# Patient Record
Sex: FEMALE | Race: WHITE | NOT HISPANIC OR LATINO | Employment: FULL TIME | URBAN - METROPOLITAN AREA
[De-identification: names, ages, dates, MRNs, and addresses within clinical notes are randomized per-mention and may not be internally consistent; named-entity substitution may affect disease eponyms.]

---

## 2017-02-23 LAB — HM PAP SMEAR: NORMAL

## 2017-03-20 ENCOUNTER — OFFICE VISIT (OUTPATIENT)
Dept: INTERNAL MEDICINE | Facility: CLINIC | Age: 22
End: 2017-03-20

## 2017-03-20 VITALS
DIASTOLIC BLOOD PRESSURE: 78 MMHG | WEIGHT: 144 LBS | HEART RATE: 79 BPM | BODY MASS INDEX: 21.82 KG/M2 | HEIGHT: 68 IN | OXYGEN SATURATION: 97 % | SYSTOLIC BLOOD PRESSURE: 108 MMHG

## 2017-03-20 DIAGNOSIS — F32.A ANXIETY AND DEPRESSION: ICD-10-CM

## 2017-03-20 DIAGNOSIS — F41.9 ANXIETY AND DEPRESSION: ICD-10-CM

## 2017-03-20 DIAGNOSIS — L70.0 ACNE VULGARIS: ICD-10-CM

## 2017-03-20 DIAGNOSIS — Z00.00 HEALTH CARE MAINTENANCE: ICD-10-CM

## 2017-03-20 DIAGNOSIS — J45.40 MODERATE PERSISTENT ASTHMA WITHOUT COMPLICATION: Primary | ICD-10-CM

## 2017-03-20 PROBLEM — R01.1 HEART MURMUR: Status: ACTIVE | Noted: 2017-03-20

## 2017-03-20 PROBLEM — J45.30 MILD PERSISTENT ASTHMA: Status: ACTIVE | Noted: 2017-03-20

## 2017-03-20 PROCEDURE — 99214 OFFICE O/P EST MOD 30 MIN: CPT | Performed by: INTERNAL MEDICINE

## 2017-03-20 PROCEDURE — 94640 AIRWAY INHALATION TREATMENT: CPT | Performed by: INTERNAL MEDICINE

## 2017-03-20 RX ORDER — ALBUTEROL SULFATE 2.5 MG/3ML
2.5 SOLUTION RESPIRATORY (INHALATION) ONCE
Status: COMPLETED | OUTPATIENT
Start: 2017-03-20 | End: 2017-03-20

## 2017-03-20 RX ORDER — ONDANSETRON HYDROCHLORIDE 8 MG/1
8 TABLET, FILM COATED ORAL EVERY 8 HOURS PRN
Qty: 20 TABLET | Refills: 0 | Status: SHIPPED | OUTPATIENT
Start: 2017-03-20 | End: 2017-05-16

## 2017-03-20 RX ORDER — ALBUTEROL SULFATE 90 UG/1
2 AEROSOL, METERED RESPIRATORY (INHALATION) EVERY 4 HOURS PRN
COMMUNITY
End: 2017-05-16

## 2017-03-20 RX ORDER — SERTRALINE HYDROCHLORIDE 25 MG/1
25 TABLET, FILM COATED ORAL DAILY
Qty: 30 TABLET | Refills: 1 | Status: SHIPPED | OUTPATIENT
Start: 2017-03-20 | End: 2017-05-15 | Stop reason: SDUPTHER

## 2017-03-20 RX ADMIN — ALBUTEROL SULFATE 2.5 MG: 2.5 SOLUTION RESPIRATORY (INHALATION) at 14:59

## 2017-03-20 NOTE — PROGRESS NOTES
Subjective     Humberto Gold is a 21 y.o. 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 She is [place] in 3 months and is having an IUD placed by     Anxiety/Depression: chronic issue and saw a therapist in high school that did not help as much as she would like. She was very apathetic and stayed in her own little bubble. She does not eat when she depressed and has poor sleep. She was engaged at age 20 years old and broke it off on her own and felt well after that, but then found that she. She has anxiety when she is in a group setting and has social anxiety. She has a studdor when her anxiety is worse in social situations. She has wanted to hurt herself several times, but did not have a plan. These both happened within the last 2 years.     Asthma: chronic issue and is currently on Pulmicort BID (that she restarted in mid February) and is doing much better. She uses her albuterol about once per week. She does not have night time cough. She has never been admitted for her asthma and has not had steroids in the past that she knows of . She has some SOB with exercise. It is triggered by allergies and cold weather. She has not been tested for allergies. She has had PFT's.     Acne: chronic issue and has used multiple products in past including Clinique acne, Peter Mark Barba, anti-biotic cream, and benzyl peroxide cream with no help. She is interested in trying oral medication including antibiotics and Accutane.        The following portions of the patient's history were reviewed and updated as appropriate: allergies, current medications, past family history, past medical history, past social history, past surgical history and problem list.    Allergies: Peanuts [peanut oil] and Penicillins    Current Outpatient  "Prescriptions:   •  albuterol (PROVENTIL HFA;VENTOLIN HFA) 108 (90 BASE) MCG/ACT inhaler, Inhale 2 puffs Every 4 (Four) Hours As Needed., Disp: , Rfl:   •  budesonide (PULMICORT) 90 MCG/ACT inhaler, Inhale 1 puff 2 (Two) Times a Day., Disp: 1 inhaler, Rfl: 0  •  levonorgestrel-ethinyl estradiol (ORSYTHIA) 0.1-20 MG-MCG per tablet, Take 1 tablet by mouth Daily., Disp: , Rfl:   •  ondansetron (ZOFRAN) 8 MG tablet, Take 1 tablet by mouth Every 8 (Eight) Hours As Needed for Nausea or Vomiting., Disp: 20 tablet, Rfl: 0  •  sertraline (ZOLOFT) 25 MG tablet, Take 1 tablet by mouth Daily., Disp: 30 tablet, Rfl: 1  No current facility-administered medications for this visit.       Review of Systems   Constitutional: Negative for chills, fatigue and fever.   HENT: Negative for congestion and rhinorrhea.    Respiratory: Negative for cough, shortness of breath and wheezing.    Gastrointestinal: Negative for abdominal pain, diarrhea and nausea.   Genitourinary: Negative for difficulty urinating and dysuria.   Skin: Positive for rash (Acne on face).   Psychiatric/Behavioral: Positive for dysphoric mood and sleep disturbance. Negative for suicidal ideas. The patient is nervous/anxious.        Objective     Visit Vitals   • /78 (BP Location: Left arm, Patient Position: Sitting, Cuff Size: Adult)   • Pulse 79   • Ht 68\" (172.7 cm)   • Wt 144 lb (65.3 kg)   • SpO2 97%   • BMI 21.9 kg/m2         Physical Exam   Constitutional: She is oriented to person, place, and time. She appears well-developed and well-nourished. No distress.   HENT:   Head: Normocephalic and atraumatic.   Right Ear: Tympanic membrane and external ear normal.   Left Ear: Tympanic membrane and external ear normal.   Mouth/Throat: Oropharynx is clear and moist. No oropharyngeal exudate.   Eyes: Conjunctivae are normal. Right eye exhibits no discharge. Left eye exhibits no discharge. No scleral icterus.   Neck: Neck supple.   Cardiovascular: Normal rate, regular " rhythm and normal heart sounds.  Exam reveals no gallop and no friction rub.    No murmur heard.  Pulmonary/Chest: Effort normal and breath sounds normal. No respiratory distress. She has no wheezes. She has no rales.   Abdominal: Soft. Bowel sounds are normal. She exhibits no distension and no mass. There is no tenderness. There is no guarding.   Lymphadenopathy:     She has no cervical adenopathy.   Neurological: She is alert and oriented to person, place, and time.   Skin: Skin is warm. No rash noted.   Psychiatric: She has a normal mood and affect. Her behavior is normal.   Nursing note and vitals reviewed.        Pulmonary Function Test  Date/Time: 3/20/2017 3:21 PM  Performed by: AVE GIPSON  Authorized by: AVE GIPSON   Comments: PFT's with FEV1 of 64% with 97% after albuterol with 151% change after bronchodilator   Moderate persistent asthma    Just restarted back on Pulmicort about 3-4 weeks ago           Results for orders placed or performed during the hospital encounter of 02/20/17   POCT Influenza A/B   Result Value Ref Range    Rapid Influenza A Ag Neg     Rapid Influenza B Ag Neg     Internal Control Passed Passed    Lot Number [id number]     Expiration Date 12/23/18        Assessment/Plan   Humberto was seen today for establish care and asthma.    Diagnoses and all orders for this visit:    Moderate persistent asthma without complication  -     Pulmonary Function Test  -     albuterol (PROVENTIL) nebulizer solution 0.083% 2.5 mg/3mL; Take 2.5 mg by nebulization 1 (One) Time.    Anxiety and depression    Acne vulgaris  -     Ambulatory Referral to Dermatology    Health care maintenance  -     CBC & Differential  -     Comprehensive Metabolic Panel  -     Lipid Panel With LDL / HDL Ratio  -     TSH Rfx On Abnormal To Free T4  -     Urinalysis With / Culture If Indicated    Other orders  -     sertraline (ZOLOFT) 25 MG tablet; Take 1 tablet by mouth Daily.  -     ondansetron (ZOFRAN) 8 MG tablet;  Take 1 tablet by mouth Every 8 (Eight) Hours As Needed for Nausea or Vomiting.    Asthma: PFT's show FEV1 of 64% today with great improvement. Will continue Pulmicort and as needed albuterol. She has just restarted Pulmicort, so her PFT's may not truly affect decreased inflammation. Will reassess at next visit with me before she moves to [place]. If still having symptoms of SOB with exertion, will consider combined ICS/LABA.    Anxiety/depression: will start Zoloft and see how she does. Interested in therapy, but would probably be best to have this at college in [place] when she moves in a few weeks. Educated on side effects and when to call. Will start with 25mg and increase to 50mg at our next visit if she is doing well.     Acne: interested in seeing dermatologist and will have her establish are with someone here. If this takes a while, we may need to send to [place].       Return in about 8 weeks (around 5/16/2017) for Annual physical, Recheck.    Corina Cuenca MD  03/20/201721

## 2017-04-14 ENCOUNTER — OFFICE VISIT (OUTPATIENT)
Dept: OBSTETRICS AND GYNECOLOGY | Facility: CLINIC | Age: 22
End: 2017-04-14

## 2017-04-14 VITALS
DIASTOLIC BLOOD PRESSURE: 78 MMHG | BODY MASS INDEX: 21.82 KG/M2 | WEIGHT: 144 LBS | HEIGHT: 68 IN | SYSTOLIC BLOOD PRESSURE: 122 MMHG

## 2017-04-14 DIAGNOSIS — Z13.9 SCREENING: Primary | ICD-10-CM

## 2017-04-14 DIAGNOSIS — Z01.419 NORMAL GYNECOLOGIC EXAMINATION: ICD-10-CM

## 2017-04-14 DIAGNOSIS — Z30.011 ENCOUNTER FOR INITIAL PRESCRIPTION OF CONTRACEPTIVE PILLS: ICD-10-CM

## 2017-04-14 LAB
B-HCG UR QL: NEGATIVE
BILIRUB BLD-MCNC: NEGATIVE MG/DL
CLARITY, POC: CLEAR
COLOR UR: YELLOW
GLUCOSE UR STRIP-MCNC: NEGATIVE MG/DL
INTERNAL NEGATIVE CONTROL: NEGATIVE
INTERNAL POSITIVE CONTROL: POSITIVE
KETONES UR QL: NEGATIVE
LEUKOCYTE EST, POC: NEGATIVE
Lab: NORMAL
NITRITE UR-MCNC: NEGATIVE MG/ML
PH UR: 6 [PH] (ref 5–8)
PROT UR STRIP-MCNC: NEGATIVE MG/DL
RBC # UR STRIP: ABNORMAL /UL
SP GR UR: 1 (ref 1–1.03)
UROBILINOGEN UR QL: NORMAL

## 2017-04-14 PROCEDURE — 81025 URINE PREGNANCY TEST: CPT | Performed by: NURSE PRACTITIONER

## 2017-04-14 PROCEDURE — 99385 PREV VISIT NEW AGE 18-39: CPT | Performed by: NURSE PRACTITIONER

## 2017-04-14 PROCEDURE — 81002 URINALYSIS NONAUTO W/O SCOPE: CPT | Performed by: NURSE PRACTITIONER

## 2017-04-14 RX ORDER — LEVONORGESTREL AND ETHINYL ESTRADIOL 0.1-0.02MG
1 KIT ORAL DAILY
Qty: 28 TABLET | Refills: 10 | Status: SHIPPED | OUTPATIENT
Start: 2017-04-14 | End: 2017-05-16

## 2017-04-17 PROBLEM — Z13.9 SCREENING: Status: ACTIVE | Noted: 2017-04-17

## 2017-04-17 PROBLEM — Z30.011 ENCOUNTER FOR INITIAL PRESCRIPTION OF CONTRACEPTIVE PILLS: Status: ACTIVE | Noted: 2017-04-17

## 2017-04-17 PROBLEM — Z01.419 NORMAL GYNECOLOGIC EXAMINATION: Status: ACTIVE | Noted: 2017-04-17

## 2017-04-17 NOTE — PROGRESS NOTES
GYN Annual Exam     No chief complaint on file.      Humberto Gold is a 21 y.o. female who presents for annual well woman exam. Periods are regular every 28-30 days, lasting 5 days. Dysmenorrhea:none. Cyclic symptoms include none. No intermenstrual bleeding, spotting, or discharge.    OB History      Para Term  AB TAB SAB Ectopic Multiple Living    0 0 0 0 0 0 0 0 0 0          Current contraception: none  History of abnormal Pap smear: no  Family history of uterine, colon or ovarian cancer: no  History of abnormal mammogram: no  Family history of breast cancer: no  Last Pap : NA  Gardasil Vaccine: completed     Past Medical History:   Diagnosis Date   • Acne    • Asthma    • Heart murmur        History reviewed. No pertinent surgical history.      Current Outpatient Prescriptions:   •  albuterol (PROVENTIL HFA;VENTOLIN HFA) 108 (90 BASE) MCG/ACT inhaler, Inhale 2 puffs Every 4 (Four) Hours As Needed., Disp: , Rfl:   •  budesonide (PULMICORT) 90 MCG/ACT inhaler, Inhale 1 puff 2 (Two) Times a Day., Disp: 1 inhaler, Rfl: 0  •  levonorgestrel-ethinyl estradiol (AVIANE,ALESSE,LESSINA) 0.1-20 MG-MCG per tablet, Take 1 tablet by mouth Daily., Disp: 28 tablet, Rfl: 10  •  levonorgestrel-ethinyl estradiol (ORSYTHIA) 0.1-20 MG-MCG per tablet, Take 1 tablet by mouth Daily., Disp: , Rfl:   •  ondansetron (ZOFRAN) 8 MG tablet, Take 1 tablet by mouth Every 8 (Eight) Hours As Needed for Nausea or Vomiting., Disp: 20 tablet, Rfl: 0  •  sertraline (ZOLOFT) 25 MG tablet, Take 1 tablet by mouth Daily., Disp: 30 tablet, Rfl: 1    Allergies   Allergen Reactions   • Peanuts [Peanut Oil] Anaphylaxis   • Penicillins Anaphylaxis       Social History   Substance Use Topics   • Smoking status: Never Smoker   • Smokeless tobacco: Never Used   • Alcohol use Yes      Comment: social       Family History   Problem Relation Age of Onset   • Heart disease Paternal Grandfather    • Diabetes type II Paternal Grandfather        Review of  "Systems   Constitutional: Negative.    Respiratory: Negative.    Cardiovascular: Negative.    Gastrointestinal: Negative.    Genitourinary: Negative.    Skin: Negative.    Psychiatric/Behavioral: Negative.        /78  Ht 68\" (172.7 cm)  Wt 144 lb (65.3 kg)  LMP 04/12/2017 (Exact Date)  BMI 21.9 kg/m2    Physical Exam   Constitutional: She is oriented to person, place, and time. She appears well-developed.   Neck: Normal range of motion. Neck supple. No thyromegaly present.   Cardiovascular: Normal rate and regular rhythm.    Pulmonary/Chest: Effort normal and breath sounds normal. She exhibits no mass, no tenderness, no laceration, no edema and no swelling. Right breast exhibits no inverted nipple, no mass, no nipple discharge and no tenderness. Left breast exhibits no inverted nipple, no mass, no nipple discharge, no skin change and no tenderness.   Abdominal: Soft. Bowel sounds are normal. Hernia confirmed negative in the right inguinal area and confirmed negative in the left inguinal area.   Genitourinary: No labial fusion. There is no rash, tenderness, lesion or injury on the right labia. There is no rash, tenderness, lesion or injury on the left labia. Uterus is not deviated, not enlarged, not fixed and not tender. Cervix exhibits no motion tenderness, no discharge and no friability. Right adnexum displays no mass, no tenderness and no fullness. Left adnexum displays no tenderness and no fullness. No erythema, tenderness or bleeding in the vagina. No foreign body in the vagina. No signs of injury around the vagina. No vaginal discharge found.   Genitourinary Comments: Pap collected    Musculoskeletal: Normal range of motion.   Lymphadenopathy:        Right: No inguinal adenopathy present.        Left: No inguinal adenopathy present.   Neurological: She is alert and oriented to person, place, and time. She has normal reflexes.   Skin: Skin is warm and dry.   Vitals reviewed.         Assessment     1) " GYN annual well woman exam.   2) Contraception management      Plan     1) Breast Health - Clinical breast exam yearly, Self breast awareness monthly  2) Pap - Collected today   3) Contraception-Quick start OCPs. Rev ACHES. BUM X 2 weeks  4) STD- Enc condom use.  Declines STD panel  5) Smoking status- Non smoker    6) Follow up prn and one year.     Althea Friedman, GOGO  4/17/2017  3:15 PM

## 2017-05-11 LAB
ALBUMIN SERPL-MCNC: 4.1 G/DL (ref 3.5–5.2)
ALBUMIN/GLOB SERPL: 1.5 G/DL
ALP SERPL-CCNC: 55 U/L (ref 40–129)
ALT SERPL-CCNC: 7 U/L (ref 5–33)
APPEARANCE UR: CLEAR
AST SERPL-CCNC: 12 U/L (ref 5–32)
BACTERIA #/AREA URNS HPF: ABNORMAL /HPF
BACTERIA UR CULT: NORMAL
BACTERIA UR CULT: NORMAL
BASOPHILS # BLD AUTO: 0.07 10*3/MM3 (ref 0–0.2)
BASOPHILS NFR BLD AUTO: 1.1 % (ref 0–2)
BILIRUB SERPL-MCNC: 0.6 MG/DL (ref 0.2–1.2)
BILIRUB UR QL STRIP: NEGATIVE
BUN SERPL-MCNC: 8 MG/DL (ref 6–20)
BUN/CREAT SERPL: 12.1 (ref 7–25)
CALCIUM SERPL-MCNC: 8.8 MG/DL (ref 8.6–10.5)
CHLORIDE SERPL-SCNC: 102 MMOL/L (ref 98–107)
CHOLEST SERPL-MCNC: 146 MG/DL (ref 0–200)
CO2 SERPL-SCNC: 25.1 MMOL/L (ref 22–29)
COLOR UR: YELLOW
CREAT SERPL-MCNC: 0.66 MG/DL (ref 0.57–1)
EOSINOPHIL # BLD AUTO: 0.7 10*3/MM3 (ref 0.1–0.3)
EOSINOPHIL NFR BLD AUTO: 10.6 % (ref 0–4)
EPI CELLS #/AREA URNS HPF: ABNORMAL /HPF
ERYTHROCYTE [DISTWIDTH] IN BLOOD BY AUTOMATED COUNT: 12.2 % (ref 11.5–14.5)
GLOBULIN SER CALC-MCNC: 2.7 GM/DL
GLUCOSE SERPL-MCNC: 78 MG/DL (ref 65–99)
GLUCOSE UR QL: NEGATIVE
HCT VFR BLD AUTO: 38.7 % (ref 37–47)
HDLC SERPL-MCNC: 74 MG/DL (ref 40–60)
HGB BLD-MCNC: 12.9 G/DL (ref 12–16)
HGB UR QL STRIP: NEGATIVE
IMM GRANULOCYTES # BLD: 0.02 10*3/MM3 (ref 0–0.03)
IMM GRANULOCYTES NFR BLD: 0.3 % (ref 0–0.5)
KETONES UR QL STRIP: NEGATIVE
LDLC SERPL CALC-MCNC: 59 MG/DL (ref 0–100)
LDLC/HDLC SERPL: 0.8 {RATIO}
LEUKOCYTE ESTERASE UR QL STRIP: ABNORMAL
LYMPHOCYTES # BLD AUTO: 2.07 10*3/MM3 (ref 0.6–4.8)
LYMPHOCYTES NFR BLD AUTO: 31.5 % (ref 20–45)
MCH RBC QN AUTO: 29.9 PG (ref 27–31)
MCHC RBC AUTO-ENTMCNC: 33.3 G/DL (ref 31–37)
MCV RBC AUTO: 89.6 FL (ref 81–99)
MICRO URNS: ABNORMAL
MONOCYTES # BLD AUTO: 0.46 10*3/MM3 (ref 0–1)
MONOCYTES NFR BLD AUTO: 7 % (ref 3–8)
MUCOUS THREADS URNS QL MICRO: PRESENT /HPF
NEUTROPHILS # BLD AUTO: 3.26 10*3/MM3 (ref 1.5–8.3)
NEUTROPHILS NFR BLD AUTO: 49.5 % (ref 45–70)
NITRITE UR QL STRIP: NEGATIVE
NRBC BLD AUTO-RTO: 0 /100 WBC (ref 0–0)
PH UR STRIP: 7 [PH] (ref 5–7.5)
PLATELET # BLD AUTO: 296 10*3/MM3 (ref 140–500)
POTASSIUM SERPL-SCNC: 5 MMOL/L (ref 3.5–5.2)
PROT SERPL-MCNC: 6.8 G/DL (ref 6–8.5)
PROT UR QL STRIP: NEGATIVE
RBC # BLD AUTO: 4.32 10*6/MM3 (ref 4.2–5.4)
RBC #/AREA URNS HPF: ABNORMAL /HPF
SODIUM SERPL-SCNC: 140 MMOL/L (ref 136–145)
SP GR UR: 1.02 (ref 1–1.03)
TRIGL SERPL-MCNC: 64 MG/DL (ref 0–150)
TSH SERPL DL<=0.005 MIU/L-ACNC: 0.71 MIU/ML (ref 0.27–4.2)
URINALYSIS REFLEX: ABNORMAL
UROBILINOGEN UR STRIP-MCNC: 0.2 MG/DL (ref 0.2–1)
VLDLC SERPL CALC-MCNC: 12.8 MG/DL (ref 7–27)
WBC # BLD AUTO: 6.58 10*3/MM3 (ref 4.8–10.8)
WBC #/AREA URNS HPF: ABNORMAL /HPF

## 2017-05-15 RX ORDER — SERTRALINE HYDROCHLORIDE 25 MG/1
25 TABLET, FILM COATED ORAL DAILY
Qty: 30 TABLET | Refills: 6 | Status: SHIPPED | OUTPATIENT
Start: 2017-05-15 | End: 2017-05-16

## 2017-05-16 ENCOUNTER — OFFICE VISIT (OUTPATIENT)
Dept: INTERNAL MEDICINE | Facility: CLINIC | Age: 22
End: 2017-05-16

## 2017-05-16 VITALS
HEIGHT: 68 IN | BODY MASS INDEX: 22.13 KG/M2 | OXYGEN SATURATION: 98 % | WEIGHT: 146 LBS | DIASTOLIC BLOOD PRESSURE: 72 MMHG | HEART RATE: 79 BPM | SYSTOLIC BLOOD PRESSURE: 118 MMHG

## 2017-05-16 DIAGNOSIS — F32.A ANXIETY AND DEPRESSION: ICD-10-CM

## 2017-05-16 DIAGNOSIS — F41.9 ANXIETY AND DEPRESSION: ICD-10-CM

## 2017-05-16 DIAGNOSIS — Z00.00 ANNUAL PHYSICAL EXAM: Primary | ICD-10-CM

## 2017-05-16 PROCEDURE — 99395 PREV VISIT EST AGE 18-39: CPT | Performed by: INTERNAL MEDICINE

## 2017-05-16 PROCEDURE — 99212 OFFICE O/P EST SF 10 MIN: CPT | Performed by: INTERNAL MEDICINE

## 2017-05-16 PROCEDURE — 93000 ELECTROCARDIOGRAM COMPLETE: CPT | Performed by: INTERNAL MEDICINE

## 2017-05-16 RX ORDER — CETIRIZINE HYDROCHLORIDE 10 MG/1
10 TABLET ORAL DAILY
COMMUNITY
End: 2017-08-04

## 2017-05-16 RX ORDER — CITALOPRAM 10 MG/1
10 TABLET ORAL DAILY
Qty: 30 TABLET | Refills: 6 | Status: SHIPPED | OUTPATIENT
Start: 2017-05-16 | End: 2017-09-26

## 2017-05-22 RX ORDER — LEVONORGESTREL AND ETHINYL ESTRADIOL 0.1-0.02MG
KIT ORAL
Qty: 28 TABLET | Refills: 10 | Status: SHIPPED | OUTPATIENT
Start: 2017-05-22 | End: 2018-04-20

## 2017-08-04 ENCOUNTER — OFFICE VISIT (OUTPATIENT)
Dept: INTERNAL MEDICINE | Facility: CLINIC | Age: 22
End: 2017-08-04

## 2017-08-04 VITALS
SYSTOLIC BLOOD PRESSURE: 108 MMHG | OXYGEN SATURATION: 98 % | HEART RATE: 76 BPM | HEIGHT: 68 IN | WEIGHT: 147 LBS | DIASTOLIC BLOOD PRESSURE: 70 MMHG | BODY MASS INDEX: 22.28 KG/M2

## 2017-08-04 DIAGNOSIS — J45.42 MODERATE PERSISTENT ASTHMA WITH STATUS ASTHMATICUS: Primary | ICD-10-CM

## 2017-08-04 PROCEDURE — 99213 OFFICE O/P EST LOW 20 MIN: CPT | Performed by: INTERNAL MEDICINE

## 2017-08-04 RX ORDER — FEXOFENADINE HYDROCHLORIDE 60 MG/1
60 TABLET, FILM COATED ORAL DAILY
COMMUNITY
End: 2018-04-20

## 2017-08-04 RX ORDER — ALBUTEROL SULFATE 2.5 MG/3ML
2.5 SOLUTION RESPIRATORY (INHALATION) ONCE
Status: DISCONTINUED | OUTPATIENT
Start: 2017-08-04 | End: 2018-07-19 | Stop reason: SDUPTHER

## 2017-08-04 RX ORDER — ALBUTEROL SULFATE 2.5 MG/3ML
2.5 SOLUTION RESPIRATORY (INHALATION) EVERY 6 HOURS PRN
Status: DISCONTINUED | OUTPATIENT
Start: 2017-08-04 | End: 2019-05-16

## 2017-08-04 RX ORDER — ALBUTEROL SULFATE 90 UG/1
2 AEROSOL, METERED RESPIRATORY (INHALATION) EVERY 4 HOURS PRN
Qty: 1 INHALER | Refills: 6 | Status: SHIPPED | OUTPATIENT
Start: 2017-08-04 | End: 2018-11-14 | Stop reason: SDUPTHER

## 2017-08-04 RX ORDER — PREDNISONE 50 MG/1
50 TABLET ORAL DAILY
Qty: 5 TABLET | Refills: 0 | Status: SHIPPED | OUTPATIENT
Start: 2017-08-04 | End: 2017-08-09

## 2017-08-04 RX ORDER — BUDESONIDE AND FORMOTEROL FUMARATE DIHYDRATE 80; 4.5 UG/1; UG/1
2 AEROSOL RESPIRATORY (INHALATION)
COMMUNITY
End: 2018-03-15 | Stop reason: SDUPTHER

## 2017-08-04 NOTE — PATIENT INSTRUCTIONS
Asthma Action Plan, Adult  Name: ________________________________ Date: _______  Follow-Up Visit With Health Care Provider  Bring your medicines to your follow-up visits.  · Health care provider name: ____________________  · Telephone: ____________________  · How often should I see my health care provider? ____________________  The actions that you should take to control your asthma are based on the symptoms that you are having. The condition can be divided into 3 zones: the green zone, yellow zone, and red zone. Follow the action steps for the zone that you are in each day.  GREEN ZONE: WHEN ASTHMA IS UNDER CONTROL  SIGNS AND SYMPTOMS  You may not have any symptoms while you are in the green zone. This means that you:  · Have no coughing or wheezing, even while you are working or playing.  · Sleep through the night.  · Are breathing well.  If you use a peak flow meter:  The peak flow is above ______ (80% of your personal best or greater).  You should take these medicines every day:  · Controller medicine and dosage: ________________  · Controller medicine and dosage: ________________  · Controller medicine and dosage: ________________  · Controller medicine and dosage: ________________  · Before exercise, use this reliever or rescue medicine: ________________  Call your health care provider if:  · You are using a reliever or rescue medicine more than 2-3 times per week.  YELLOW ZONE: WHEN ASTHMA IS GETTING WORSE  SIGNS AND SYMPTOMS  When you are in the yellow zone, you may have symptoms that interfere with exercise, are noticeably worse after exposure to triggers, or are worse at the first sign of a cold (upper respiratory infection). These may include:  · Waking from sleep.  · Coughing, especially at night or first thing in the morning.  · Mild wheezing.  · Chest tightness.  If you use a peak flow meter:  The peak flow is _____ to _____ (50-79% of your personal best).  Add the following medicine to the ones that  you use daily:  · Reliever or rescue medicine and dosage: ________________  · Additional medicine and dosage: ________________  Call your health care provider if:  · You are using a reliever or rescue medicine more than 2-3 times per week.  · You remain in the yellow zone for _____ hours.  RED ZONE: WHEN ASTHMA IS SEVERE   SIGNS AND SYMPTOMS  You will likely feel distressed and have symptoms at rest that restrict your activity. You are in the red zone if:  · Your breathing is hard and quickly.  · Your nose opens wide, your ribs show, and your neck muscles become visible when you breathe in.  · Your lips, fingers, or toes are a bluish color.  · You have trouble speaking in full sentences.  · Your symptoms do not improve within 15-20 minutes after you use your reliever or rescue medicine (bronchodilator).  If you use a peak flow meter:  The peak flow is less than _____ (less than 50% of your personal best).  Call your local emergency services (911 in the [place] right away or seek help at the emergency department of the nearest hospital.  Use your reliever or rescue medicine.  · Start a nebulizer treatment or take 2-4 puffs from a metered-dose inhaler with a spacer.  · Repeat this action every 15-20 minutes until help arrives.  WHAT ARE SOME COMMON ASTHMA TRIGGERS?  Discuss your asthma triggers with your health care provider. Some common triggers are:  · Dander from the skin, hair, or feathers of animals.  · Dust mites.  · Cockroaches.  · Pollen from trees or grass.  · Mold.  · Cigarette or tobacco smoke.  · Air pollutants, such as dust, household , hair sprays, aerosol sprays, scented candles, paint fumes, strong chemicals, or strong odors.  · Cold air or changes in weather. Cold air may cause inflammation. Winds increase molds and pollens in the air.  · Strong emotions, such as crying or laughing hard.  · Stress.  · Certain medicines, such as aspirin or beta blockers.  · Sulfites in foods and drinks, such as  dried fruits and wine.  · Infections or inflammatory conditions, such as:    Flu (influenza).    Upper respiratory tract infection.    Lower respiratory tract infection (pneumonia or bronchitis).    Inflammation of the nasal membranes (rhinitis).  · Gastroesophageal reflux disease (GERD). GERD is a condition in which stomach acid comes up into the throat (esophagus).  · Exercise or strenuous activity.     This information is not intended to replace advice given to you by your health care provider. Make sure you discuss any questions you have with your health care provider.     Document Released: 10/15/2010 Document Revised: 04/10/2017 Document Reviewed: 10/06/2015  [employer] Interactive Patient Education ©2017 [employer]

## 2017-08-04 NOTE — PROGRESS NOTES
Subjective     Humberto Gold is a 21 y.o. female, who presents with a chief complaint of   Chief Complaint   Patient presents with   • Asthma     x issues, chest tightness, 3 x weeks, switch to symbicort        HPI Comments: 20 yo F with moderate persistent asthma who ran out of pulmicort two weeks ago and did not call to let us know. She has been using her rescue inhaler three to four times per day and sometimes at night. She has been very short of breath. No wheezing that she has noticed. Recently switched her Zyrtec to Allegra.Has not been on steroid in a long time. Last albuterol 2 puffs at 10am this morning. No fevers or chills. No congestion.        The following portions of the patient's history were reviewed and updated as appropriate: allergies, current medications, past family history, past medical history, past social history, past surgical history and problem list.    Allergies: Peanuts [peanut oil] and Penicillins    Current Outpatient Prescriptions:   •  budesonide (PULMICORT) 90 MCG/ACT inhaler, Inhale 1 puff 2 (Two) Times a Day., Disp: 1 inhaler, Rfl: 0  •  budesonide-formoterol (SYMBICORT) 80-4.5 MCG/ACT inhaler, Inhale 2 puffs 2 (Two) Times a Day., Disp: , Rfl:   •  citalopram (CELEXA) 10 MG tablet, Take 1 tablet by mouth Daily., Disp: 30 tablet, Rfl: 6  •  fexofenadine (ALLEGRA) 60 MG tablet, Take 60 mg by mouth Daily., Disp: , Rfl:   •  ORSYTHIA 0.1-20 MG-MCG per tablet, TAKE ONE TABLET BY MOUTH DAILY, Disp: 28 tablet, Rfl: 10  •  albuterol (PROVENTIL HFA;VENTOLIN HFA) 108 (90 BASE) MCG/ACT inhaler, Inhale 2 puffs Every 4 (Four) Hours As Needed for Wheezing., Disp: 1 inhaler, Rfl: 6  •  predniSONE (DELTASONE) 50 MG tablet, Take 1 tablet by mouth Daily for 5 days., Disp: 5 tablet, Rfl: 0    Current Facility-Administered Medications:   •  albuterol (PROVENTIL) nebulizer solution 0.083% 2.5 mg/3mL, 2.5 mg, Nebulization, Once, Corina Cuenca MD  •  albuterol (PROVENTIL) nebulizer solution 0.083% 2.5  "mg/3mL, 2.5 mg, Nebulization, Q6H PRN, Corina Cuenca MD  Medications Discontinued During This Encounter   Medication Reason   • cetirizine (zyrTEC) 10 MG tablet Therapy completed       Review of Systems   Constitutional: Negative for chills and fever.   HENT: Negative for congestion and rhinorrhea.    Respiratory: Positive for cough, chest tightness and shortness of breath.    Cardiovascular: Negative for chest pain and palpitations.   Gastrointestinal: Negative for diarrhea, nausea and vomiting.   Genitourinary: Negative for difficulty urinating and dysuria.   Neurological: Negative for dizziness and headaches.       Objective     /70 (BP Location: Left arm, Patient Position: Sitting, Cuff Size: Adult)  Pulse 76  Ht 68\" (172.7 cm)  Wt 147 lb (66.7 kg)  SpO2 98%  BMI 22.35 kg/m2      Physical Exam   Constitutional: She is oriented to person, place, and time. She appears well-developed and well-nourished. No distress.   HENT:   Head: Normocephalic and atraumatic.   Right Ear: External ear normal.   Left Ear: External ear normal.   Mouth/Throat: Oropharynx is clear and moist. No oropharyngeal exudate.   Eyes: Conjunctivae are normal. Right eye exhibits no discharge. Left eye exhibits no discharge. No scleral icterus.   Neck: Neck supple.   Cardiovascular: Normal rate, regular rhythm and normal heart sounds.  Exam reveals no gallop and no friction rub.    No murmur heard.  Pulmonary/Chest: Effort normal. No respiratory distress. She has decreased breath sounds (Diffusely with decreased air movement ). She has no wheezes. She has no rales.   Lymphadenopathy:     She has no cervical adenopathy.   Neurological: She is alert and oriented to person, place, and time. She exhibits normal muscle tone.   Skin: Skin is warm. No rash noted.   Psychiatric: She has a normal mood and affect. Her behavior is normal.   Nursing note and vitals reviewed.        Results for orders placed or performed in visit on 04/14/17 "   POC Urinalysis Dipstick   Result Value Ref Range    Color Yellow Yellow, Straw, Dark Yellow, Priscila    Clarity, UA Clear Clear    Glucose, UA Negative Negative, 1000 mg/dL (3+) mg/dL    Bilirubin Negative Negative    Ketones, UA Negative Negative    Specific Gravity  1.005 1.005 - 1.030    Blood, UA Trace (A) Negative    pH, Urine 6.0 5.0 - 8.0    Protein, POC Negative Negative mg/dL    Urobilinogen, UA Normal Normal    Leukocytes Negative Negative    Nitrite, UA Negative Negative   POC Pregnancy, Urine   Result Value Ref Range    HCG, Urine, QL Negative Negative    Lot Number [id number]     Internal Positive Control Positive     Internal Negative Control Negative        Assessment/Plan   Humberto was seen today for asthma.    Diagnoses and all orders for this visit:    Moderate persistent asthma with status asthmaticus  -     albuterol (PROVENTIL) nebulizer solution 0.083% 2.5 mg/3mL; Take 2.5 mg by nebulization 1 (One) Time.  -     albuterol (PROVENTIL) nebulizer solution 0.083% 2.5 mg/3mL; Take 2.5 mg by nebulization Every 6 (Six) Hours As Needed for Wheezing.    Other orders  -     albuterol (PROVENTIL HFA;VENTOLIN HFA) 108 (90 BASE) MCG/ACT inhaler; Inhale 2 puffs Every 4 (Four) Hours As Needed for Wheezing.  -     predniSONE (DELTASONE) 50 MG tablet; Take 1 tablet by mouth Daily for 5 days.    Albuterol nebulizer was given twice and she has improvement in air movement, but still has decreased air movement in bases. Will start Symbicort as well as prednisone with spacer use. Provided peak flow inhaler that she will use everyday to help her recognize her asthma better. Asthma action plan was given. Albuterol every 4-6 hours over the next 48 hours and then as needed thereafter.     Will follow up in one week since she lives in [place] and going to school there currently. Knows to go to ER if she has CP, SOB or increase in symptoms.       Return in about 1 week (around 8/11/2017) for Recheck of asthma .    Corina  DASHAWN Cuenca MD  08/04/2017

## 2017-08-11 ENCOUNTER — OFFICE VISIT (OUTPATIENT)
Dept: INTERNAL MEDICINE | Facility: CLINIC | Age: 22
End: 2017-08-11

## 2017-08-11 VITALS
HEIGHT: 68 IN | SYSTOLIC BLOOD PRESSURE: 116 MMHG | DIASTOLIC BLOOD PRESSURE: 80 MMHG | WEIGHT: 146.2 LBS | OXYGEN SATURATION: 98 % | BODY MASS INDEX: 22.16 KG/M2 | HEART RATE: 100 BPM

## 2017-08-11 DIAGNOSIS — J45.30 MILD PERSISTENT ASTHMA WITHOUT COMPLICATION: Primary | ICD-10-CM

## 2017-08-11 PROCEDURE — 99213 OFFICE O/P EST LOW 20 MIN: CPT | Performed by: INTERNAL MEDICINE

## 2017-08-11 NOTE — PROGRESS NOTES
Subjective     Humberto Gold is a 21 y.o. female, who presents with a chief complaint of   Chief Complaint   Patient presents with   • Follow-up     7 day f/u, x throat    • Asthma       HPI Comments: 20 yo F here for asthma excerebration follow up. She is doing great. Breathing is much better and she has been exercising and walking and not getting SOB. She has not used her albuterol inhaler in about 5 days. No fever or chills. She does have a little bit of a sore throat that she thinks is related to dust and allergies. Completed prednisone.        The following portions of the patient's history were reviewed and updated as appropriate: allergies, current medications, past family history, past medical history, past social history, past surgical history and problem list.    Allergies: Peanuts [peanut oil] and Penicillins    Current Outpatient Prescriptions:   •  albuterol (PROVENTIL HFA;VENTOLIN HFA) 108 (90 BASE) MCG/ACT inhaler, Inhale 2 puffs Every 4 (Four) Hours As Needed for Wheezing., Disp: 1 inhaler, Rfl: 6  •  budesonide-formoterol (SYMBICORT) 80-4.5 MCG/ACT inhaler, Inhale 2 puffs 2 (Two) Times a Day., Disp: , Rfl:   •  citalopram (CELEXA) 10 MG tablet, Take 1 tablet by mouth Daily., Disp: 30 tablet, Rfl: 6  •  fexofenadine (ALLEGRA) 60 MG tablet, Take 60 mg by mouth Daily., Disp: , Rfl:   •  ORSYTHIA 0.1-20 MG-MCG per tablet, TAKE ONE TABLET BY MOUTH DAILY, Disp: 28 tablet, Rfl: 10    Current Facility-Administered Medications:   •  albuterol (PROVENTIL) nebulizer solution 0.083% 2.5 mg/3mL, 2.5 mg, Nebulization, Once, Corina Cuenca MD  •  albuterol (PROVENTIL) nebulizer solution 0.083% 2.5 mg/3mL, 2.5 mg, Nebulization, Q6H PRN, Corina Cuenca MD  Medications Discontinued During This Encounter   Medication Reason   • budesonide (PULMICORT) 90 MCG/ACT inhaler Therapy completed       Review of Systems   Constitutional: Negative for chills, fatigue and fever.   HENT: Positive for sore throat.    Respiratory:  "Negative for cough, shortness of breath and wheezing.        Objective     /80 (BP Location: Left arm, Patient Position: Sitting, Cuff Size: Adult)  Pulse 100  Ht 68\" (172.7 cm)  Wt 146 lb 3.2 oz (66.3 kg)  SpO2 98%  BMI 22.23 kg/m2      Physical Exam   Constitutional: She is oriented to person, place, and time. She appears well-developed and well-nourished. No distress.   HENT:   Head: Normocephalic and atraumatic.   Right Ear: External ear normal.   Left Ear: External ear normal.   Mouth/Throat: Oropharynx is clear and moist. No oropharyngeal exudate.   Eyes: Conjunctivae are normal. Right eye exhibits no discharge. Left eye exhibits no discharge. No scleral icterus.   Neck: Neck supple.   Cardiovascular: Normal rate, regular rhythm and normal heart sounds.  Exam reveals no gallop and no friction rub.    No murmur heard.  Pulmonary/Chest: Effort normal and breath sounds normal. No respiratory distress. She has no wheezes. She has no rales.   Lymphadenopathy:     She has no cervical adenopathy.   Neurological: She is alert and oriented to person, place, and time.   Skin: Skin is warm. No rash noted.   Psychiatric: She has a normal mood and affect. Her behavior is normal.   Nursing note and vitals reviewed.        Results for orders placed or performed in visit on 04/14/17   POC Urinalysis Dipstick   Result Value Ref Range    Color Yellow Yellow, Straw, Dark Yellow, Priscila    Clarity, UA Clear Clear    Glucose, UA Negative Negative, 1000 mg/dL (3+) mg/dL    Bilirubin Negative Negative    Ketones, UA Negative Negative    Specific Gravity  1.005 1.005 - 1.030    Blood, UA Trace (A) Negative    pH, Urine 6.0 5.0 - 8.0    Protein, POC Negative Negative mg/dL    Urobilinogen, UA Normal Normal    Leukocytes Negative Negative    Nitrite, UA Negative Negative   POC Pregnancy, Urine   Result Value Ref Range    HCG, Urine, QL Negative Negative    Lot Number [id number]     Internal Positive Control Positive     " Internal Negative Control Negative        Assessment/Plan   Humberto was seen today for follow-up and asthma.    Diagnoses and all orders for this visit:    Mild persistent asthma without complication      Asthma is doing much better and her exam is great. I want her to continue her allergy medication and Symbicort BID. If worsens, may consider Singulair as add on. Flu shot in the fall. I have discussed with her that sore throat could be PND versus side effect of Symbicort. Make sure that she rinses after each use.     Return for Next scheduled follow up.    Corina Cuenca MD  08/11/2017

## 2017-09-26 ENCOUNTER — OFFICE VISIT (OUTPATIENT)
Dept: INTERNAL MEDICINE | Facility: CLINIC | Age: 22
End: 2017-09-26

## 2017-09-26 VITALS
SYSTOLIC BLOOD PRESSURE: 116 MMHG | HEIGHT: 68 IN | HEART RATE: 102 BPM | DIASTOLIC BLOOD PRESSURE: 76 MMHG | BODY MASS INDEX: 22.13 KG/M2 | WEIGHT: 146 LBS | OXYGEN SATURATION: 97 %

## 2017-09-26 DIAGNOSIS — F41.9 ANXIETY: ICD-10-CM

## 2017-09-26 DIAGNOSIS — R41.840 INATTENTION: Primary | ICD-10-CM

## 2017-09-26 PROCEDURE — 99214 OFFICE O/P EST MOD 30 MIN: CPT | Performed by: INTERNAL MEDICINE

## 2017-09-26 RX ORDER — BUSPIRONE HYDROCHLORIDE 5 MG/1
5 TABLET ORAL 2 TIMES DAILY PRN
Qty: 60 TABLET | Refills: 0 | Status: SHIPPED | OUTPATIENT
Start: 2017-09-26 | End: 2019-02-03

## 2017-09-26 NOTE — PROGRESS NOTES
Subjective     Humberto Gold is a 22 y.o. female, who presents with a chief complaint of   Chief Complaint   Patient presents with   • Anxiety     patient states rx celexa for anxiety/depression is not helping.        HPI Comments: 23 yo F with asthma and anxiety and attention issues. She had mild ADHD at that time. She doesn't think that it is serious enough to be put on medication. She does get overwhelmed at times when she is doing her homework. She has anxiety about not being able to finish her work. Her heart is pounding when these episodes happen and then will resolve with deep breathing. She was tested in high school. She was seen at a testing center in [place],but is unsure of the doctor's name.        The following portions of the patient's history were reviewed and updated as appropriate: allergies, current medications, past family history, past medical history, past social history, past surgical history and problem list.    Allergies: Peanuts [peanut oil] and Penicillins    Current Outpatient Prescriptions:   •  albuterol (PROVENTIL HFA;VENTOLIN HFA) 108 (90 BASE) MCG/ACT inhaler, Inhale 2 puffs Every 4 (Four) Hours As Needed for Wheezing., Disp: 1 inhaler, Rfl: 6  •  budesonide-formoterol (SYMBICORT) 80-4.5 MCG/ACT inhaler, Inhale 2 puffs 2 (Two) Times a Day., Disp: , Rfl:   •  fexofenadine (ALLEGRA) 60 MG tablet, Take 60 mg by mouth Daily., Disp: , Rfl:   •  ORSYTHIA 0.1-20 MG-MCG per tablet, TAKE ONE TABLET BY MOUTH DAILY, Disp: 28 tablet, Rfl: 10  •  busPIRone (BUSPAR) 5 MG tablet, Take 1 tablet by mouth 2 (Two) Times a Day As Needed (Anxiety)., Disp: 60 tablet, Rfl: 0    Current Facility-Administered Medications:   •  albuterol (PROVENTIL) nebulizer solution 0.083% 2.5 mg/3mL, 2.5 mg, Nebulization, Once, Corina Cuenca MD  •  albuterol (PROVENTIL) nebulizer solution 0.083% 2.5 mg/3mL, 2.5 mg, Nebulization, Q6H PRN, Corina Cuenca MD  Medications Discontinued During This Encounter   Medication  "Reason   • citalopram (CELEXA) 10 MG tablet Therapy completed       Review of Systems   Constitutional: Negative for chills and fever.   HENT: Negative for congestion and rhinorrhea.    Cardiovascular: Negative for chest pain.   Gastrointestinal: Negative for vomiting.   Skin: Negative for rash.   Psychiatric/Behavioral: Positive for agitation and decreased concentration. Negative for sleep disturbance. The patient is nervous/anxious.        Objective     /76 (BP Location: Right arm, Patient Position: Sitting, Cuff Size: Adult)  Pulse 102  Ht 68\" (172.7 cm)  Wt 146 lb (66.2 kg)  SpO2 97%  BMI 22.2 kg/m2      Physical Exam   Constitutional: She is oriented to person, place, and time. She appears well-developed and well-nourished. No distress.   HENT:   Head: Normocephalic and atraumatic.   Right Ear: External ear normal.   Left Ear: External ear normal.   Mouth/Throat: Oropharynx is clear and moist. No oropharyngeal exudate.   Eyes: Conjunctivae are normal. Right eye exhibits no discharge. Left eye exhibits no discharge. No scleral icterus.   Neck: Neck supple.   Cardiovascular: Normal rate.    Pulmonary/Chest: Effort normal.   Abdominal: Soft.   Lymphadenopathy:     She has no cervical adenopathy.   Neurological: She is alert and oriented to person, place, and time.   Skin: Skin is warm. No rash noted.   Psychiatric: She has a normal mood and affect. Her behavior is normal.   Nursing note and vitals reviewed.        Results for orders placed or performed in visit on 04/14/17   POC Urinalysis Dipstick   Result Value Ref Range    Color Yellow Yellow, Straw, Dark Yellow, Priscila    Clarity, UA Clear Clear    Glucose, UA Negative Negative, 1000 mg/dL (3+) mg/dL    Bilirubin Negative Negative    Ketones, UA Negative Negative    Specific Gravity  1.005 1.005 - 1.030    Blood, UA Trace (A) Negative    pH, Urine 6.0 5.0 - 8.0    Protein, POC Negative Negative mg/dL    Urobilinogen, UA Normal Normal    Leukocytes " Negative Negative    Nitrite, UA Negative Negative   POC Pregnancy, Urine   Result Value Ref Range    HCG, Urine, QL Negative Negative    Lot Number [id number]     Internal Positive Control Positive     Internal Negative Control Negative        Assessment/Plan   Humberto was seen today for anxiety.    Diagnoses and all orders for this visit:    Inattention  -     Ambulatory Referral to Psychology    Anxiety  -     Ambulatory Referral to Psychology    Other orders  -     busPIRone (BUSPAR) 5 MG tablet; Take 1 tablet by mouth 2 (Two) Times a Day As Needed (Anxiety).      Will have patient do formal testing for ADHD as I think that it is causing her to have anxiety. In the meantime, will stop Celexa and will start taking Buspar as needed for anxiety. Discussed side effects and reasons to call in the meantime. I spent 20 minutes with patient discussing her inattention and how it relates to her anxiety. Will try Vyvanse if she test positive for ADHD starting at 30mg.     Return if symptoms worsen or fail to improve.    Corina Cuenca MD  09/26/2017

## 2017-12-26 ENCOUNTER — OFFICE VISIT (OUTPATIENT)
Dept: INTERNAL MEDICINE | Facility: CLINIC | Age: 22
End: 2017-12-26

## 2017-12-26 VITALS
DIASTOLIC BLOOD PRESSURE: 80 MMHG | RESPIRATION RATE: 18 BRPM | BODY MASS INDEX: 22.5 KG/M2 | SYSTOLIC BLOOD PRESSURE: 120 MMHG | HEART RATE: 106 BPM | OXYGEN SATURATION: 98 % | TEMPERATURE: 98.6 F | WEIGHT: 148 LBS

## 2017-12-26 DIAGNOSIS — J06.9 ACUTE URI: ICD-10-CM

## 2017-12-26 DIAGNOSIS — J45.41 MODERATE PERSISTENT ASTHMA WITH ACUTE EXACERBATION: Primary | ICD-10-CM

## 2017-12-26 PROCEDURE — 99214 OFFICE O/P EST MOD 30 MIN: CPT | Performed by: INTERNAL MEDICINE

## 2017-12-26 RX ORDER — PREDNISONE 20 MG/1
60 TABLET ORAL DAILY
Qty: 15 TABLET | Refills: 0 | Status: SHIPPED | OUTPATIENT
Start: 2017-12-26 | End: 2017-12-31

## 2017-12-26 NOTE — PROGRESS NOTES
Subjective     Humberto Gold is a 22 y.o. female, who presents with a chief complaint of   Chief Complaint   Patient presents with   • Cough       HPI   The pt is here bc of cough and congestion x 3 days.  She has had lots of cough and has been using her inhaler a lot.  She is on symbicort and albuterol.  + aches, pnd, and sore throat.  No n/v/d.       The following portions of the patient's history were reviewed and updated as appropriate: allergies, current medications, past family history, past medical history, past social history, past surgical history and problem list.    Allergies: Peanuts [peanut oil] and Penicillins    Review of Systems   Constitutional: Positive for chills.   HENT: Positive for congestion, postnasal drip and sore throat.    Eyes: Negative.    Respiratory: Positive for cough, shortness of breath and wheezing.    Cardiovascular: Negative.    Gastrointestinal: Negative.    Endocrine: Negative.    Genitourinary: Negative.    Musculoskeletal: Negative.    Skin: Negative.    Allergic/Immunologic: Negative.    Neurological: Negative.    Hematological: Negative.    Psychiatric/Behavioral: Negative.    All other systems reviewed and are negative.      Objective     Wt Readings from Last 3 Encounters:   12/26/17 67.1 kg (148 lb)   09/26/17 66.2 kg (146 lb)   08/11/17 66.3 kg (146 lb 3.2 oz)     Temp Readings from Last 3 Encounters:   12/26/17 98.6 °F (37 °C)   02/20/17 98.4 °F (36.9 °C)     BP Readings from Last 3 Encounters:   12/26/17 120/80   09/26/17 116/76   08/11/17 116/80     Pulse Readings from Last 3 Encounters:   12/26/17 106   09/26/17 102   08/11/17 100     Body mass index is 22.5 kg/(m^2).  SpO2 Readings from Last 3 Encounters:   12/26/17 98%   09/26/17 97%   08/11/17 98%       Physical Exam   Constitutional: She is oriented to person, place, and time. She appears well-developed and well-nourished.   HENT:   Head: Normocephalic and atraumatic.   Right Ear: External ear normal.   Left Ear:  External ear normal.   Bilateral serous effusion without erythema   Eyes: Conjunctivae and lids are normal.   Neck: Normal range of motion. Neck supple.   Cardiovascular: Normal rate and regular rhythm.    Pulmonary/Chest: Effort normal and breath sounds normal. No respiratory distress. She has no wheezes.   Musculoskeletal: Normal range of motion. She exhibits no edema.   Lymphadenopathy:     She has cervical adenopathy.   Neurological: She is alert and oriented to person, place, and time. No cranial nerve deficit.   Skin: Skin is warm and dry. No rash noted.   Psychiatric: She has a normal mood and affect. Her behavior is normal. Thought content normal.   Nursing note and vitals reviewed.      Results for orders placed or performed in visit on 04/14/17   POC Urinalysis Dipstick   Result Value Ref Range    Color Yellow Yellow, Straw, Dark Yellow, Priscila    Clarity, UA Clear Clear    Glucose, UA Negative Negative, 1000 mg/dL (3+) mg/dL    Bilirubin Negative Negative    Ketones, UA Negative Negative    Specific Gravity  1.005 1.005 - 1.030    Blood, UA Trace (A) Negative    pH, Urine 6.0 5.0 - 8.0    Protein, POC Negative Negative mg/dL    Urobilinogen, UA Normal Normal    Leukocytes Negative Negative    Nitrite, UA Negative Negative   POC Pregnancy, Urine   Result Value Ref Range    HCG, Urine, QL Negative Negative    Lot Number [id number]     Internal Positive Control Positive     Internal Negative Control Negative      Flu neg    Assessment/Plan   Humberto was seen today for cough.    Diagnoses and all orders for this visit:    Moderate persistent asthma with acute exacerbation - increase symbicort to 160 dose x 2 weeks then go back to 80mg dose.  Sample given.  -     predniSONE (DELTASONE) 20 MG tablet; Take 3 tablets by mouth Daily for 5 days.    Acute URI - cont otc meds for congestion          Outpatient Medications Prior to Visit   Medication Sig Dispense Refill   • albuterol (PROVENTIL HFA;VENTOLIN HFA) 108 (90  BASE) MCG/ACT inhaler Inhale 2 puffs Every 4 (Four) Hours As Needed for Wheezing. 1 inhaler 6   • budesonide-formoterol (SYMBICORT) 80-4.5 MCG/ACT inhaler Inhale 2 puffs 2 (Two) Times a Day.     • busPIRone (BUSPAR) 5 MG tablet Take 1 tablet by mouth 2 (Two) Times a Day As Needed (Anxiety). 60 tablet 0   • fexofenadine (ALLEGRA) 60 MG tablet Take 60 mg by mouth Daily.     • ORSYTHIA 0.1-20 MG-MCG per tablet TAKE ONE TABLET BY MOUTH DAILY 28 tablet 10     Facility-Administered Medications Prior to Visit   Medication Dose Route Frequency Provider Last Rate Last Dose   • albuterol (PROVENTIL) nebulizer solution 0.083% 2.5 mg/3mL  2.5 mg Nebulization Once Corina Cuenca MD       • albuterol (PROVENTIL) nebulizer solution 0.083% 2.5 mg/3mL  2.5 mg Nebulization Q6H PRN Corina Cuenca MD         New Medications Ordered This Visit   Medications   • predniSONE (DELTASONE) 20 MG tablet     Sig: Take 3 tablets by mouth Daily for 5 days.     Dispense:  15 tablet     Refill:  0     [unfilled]  There are no discontinued medications.      Return if symptoms worsen or fail to improve.

## 2018-03-15 RX ORDER — BUDESONIDE AND FORMOTEROL FUMARATE DIHYDRATE 80; 4.5 UG/1; UG/1
2 AEROSOL RESPIRATORY (INHALATION)
Qty: 10.2 G | Refills: 3 | Status: SHIPPED | OUTPATIENT
Start: 2018-03-15 | End: 2018-10-22 | Stop reason: SDUPTHER

## 2018-04-20 ENCOUNTER — OFFICE VISIT (OUTPATIENT)
Dept: OBSTETRICS AND GYNECOLOGY | Facility: CLINIC | Age: 23
End: 2018-04-20

## 2018-04-20 VITALS
SYSTOLIC BLOOD PRESSURE: 116 MMHG | BODY MASS INDEX: 21.61 KG/M2 | DIASTOLIC BLOOD PRESSURE: 80 MMHG | WEIGHT: 145.9 LBS | HEIGHT: 69 IN

## 2018-04-20 DIAGNOSIS — Z13.9 SCREENING FOR CONDITION: ICD-10-CM

## 2018-04-20 DIAGNOSIS — Z11.3 SCREEN FOR STD (SEXUALLY TRANSMITTED DISEASE): Primary | ICD-10-CM

## 2018-04-20 DIAGNOSIS — Z30.9 ENCOUNTER FOR CONTRACEPTIVE MANAGEMENT, UNSPECIFIED TYPE: ICD-10-CM

## 2018-04-20 LAB
B-HCG UR QL: NEGATIVE
BILIRUB BLD-MCNC: NEGATIVE MG/DL
CLARITY, POC: CLEAR
COLOR UR: YELLOW
GLUCOSE UR STRIP-MCNC: NEGATIVE MG/DL
INTERNAL NEGATIVE CONTROL: NEGATIVE
INTERNAL POSITIVE CONTROL: POSITIVE
KETONES UR QL: NEGATIVE
LEUKOCYTE EST, POC: NEGATIVE
Lab: NORMAL
NITRITE UR-MCNC: NEGATIVE MG/ML
PH UR: 5 [PH] (ref 5–8)
PROT UR STRIP-MCNC: NEGATIVE MG/DL
RBC # UR STRIP: ABNORMAL /UL
SP GR UR: 1 (ref 1–1.03)
UROBILINOGEN UR QL: NORMAL

## 2018-04-20 PROCEDURE — 81002 URINALYSIS NONAUTO W/O SCOPE: CPT | Performed by: OBSTETRICS & GYNECOLOGY

## 2018-04-20 PROCEDURE — 99213 OFFICE O/P EST LOW 20 MIN: CPT | Performed by: OBSTETRICS & GYNECOLOGY

## 2018-04-20 PROCEDURE — 81025 URINE PREGNANCY TEST: CPT | Performed by: OBSTETRICS & GYNECOLOGY

## 2018-04-20 RX ORDER — DROSPIRENONE AND ETHINYL ESTRADIOL 0.02-3(28)
1 KIT ORAL DAILY
Qty: 84 TABLET | Refills: 3 | Status: SHIPPED | OUTPATIENT
Start: 2018-04-20 | End: 2018-07-19 | Stop reason: SDUPTHER

## 2018-04-20 RX ORDER — CITALOPRAM 10 MG/1
TABLET ORAL
COMMUNITY
Start: 2018-04-18 | End: 2018-05-16 | Stop reason: SDUPTHER

## 2018-04-20 NOTE — PROGRESS NOTES
"PROBLEM VISIT    Chief Complaint: contraception discussion and STD screening.      Humberto Gold is a 22 y.o. patient who presents for STD screening since she has a new sexual partner. Also, pt was doing well on her Orsythia and when they changed the pill to another generic she began to get cystic acne. She stopped the pills 3 months ago. She has been using condoms and vaginal films for contraception but she would like to be on another pill. Pt is due for her annual exam but started her cycle and it is heavy. She declines a physical exam today.  Chief Complaint   Patient presents with   • Contraception             The following portions of the patient's history were reviewed and updated as appropriate: allergies, current medications and problem list.    Review of Systems   Genitourinary: Positive for vaginal bleeding. Negative for dyspareunia and vaginal discharge.       /80   Ht 175.3 cm (69.02\")   Wt 66.2 kg (145 lb 14.4 oz)   LMP 04/20/2018 (Exact Date)   Breastfeeding? No   BMI 21.54 kg/m²     Physical Exam   Constitutional: She is oriented to person, place, and time. She appears well-developed and well-nourished. No distress.   Neurological: She is alert and oriented to person, place, and time.   Skin: She is not diaphoretic.   Psychiatric: She has a normal mood and affect. Her behavior is normal. Judgment and thought content normal.   Vitals reviewed.        Assessment/Plan   Humberto was seen today for contraception.    Diagnoses and all orders for this visit:    Screen for STD (sexually transmitted disease)  -     Chlamydia trachomatis, Neisseria gonorrhoeae, Trichomonas vaginalis, PCR - Urine, Urine, Random Void  -     Hepatitis B Surface Antigen  -     Hepatitis C Antibody  -     HIV-1 / O / 2 Ag / Antibody 4th Generation  -     HSV 1 & 2 - Specific Antibody, IgG  -     RPR    Screening for condition  -     POC Pregnancy, Urine  -     POC Urinalysis Dipstick    Encounter for contraceptive " management, unspecified type    21yo for STD testing and contraception    1) gyn HM: FU for pap smear    2) STD screening: new partner: Check full panel.    3) Contraception: Pt desires something for more acne control. Rx Fauzia.               No Follow-up on file.      Gema Sotelo DO    4/20/2018  12:52 PM

## 2018-04-22 LAB
HBV SURFACE AG SERPL QL IA: NEGATIVE
HCV AB S/CO SERPL IA: <0.1 S/CO RATIO (ref 0–0.9)
HIV 1+2 AB+HIV1 P24 AG SERPL QL IA: NON REACTIVE
HSV1 IGG SER IA-ACNC: <0.91 INDEX (ref 0–0.9)
HSV2 IGG SER IA-ACNC: <0.91 INDEX (ref 0–0.9)
RPR SER QL: NORMAL

## 2018-04-24 LAB
C TRACH RRNA SPEC QL NAA+PROBE: POSITIVE
N GONORRHOEA RRNA SPEC QL NAA+PROBE: NEGATIVE
T VAGINALIS RRNA SPEC QL NAA+PROBE: NEGATIVE

## 2018-04-25 RX ORDER — AZITHROMYCIN 500 MG/1
1000 TABLET, FILM COATED ORAL DAILY
Qty: 2 TABLET | Refills: 0 | Status: SHIPPED | OUTPATIENT
Start: 2018-04-25 | End: 2018-04-26

## 2018-04-30 RX ORDER — AZITHROMYCIN 500 MG/1
TABLET, FILM COATED ORAL
Qty: 2 TABLET | Refills: 0 | OUTPATIENT
Start: 2018-04-30

## 2018-05-01 ENCOUNTER — TELEPHONE (OUTPATIENT)
Dept: OBSTETRICS AND GYNECOLOGY | Facility: CLINIC | Age: 23
End: 2018-05-01

## 2018-05-01 NOTE — TELEPHONE ENCOUNTER
Pt has finished her medication for chlamydia but her partner never got any medication. Do you prescribe it or does her partner need to call their doctor to ask for it?

## 2018-05-16 RX ORDER — CITALOPRAM 10 MG/1
10 TABLET ORAL DAILY
Qty: 30 TABLET | Refills: 5 | Status: SHIPPED | OUTPATIENT
Start: 2018-05-16 | End: 2018-07-19

## 2018-07-19 ENCOUNTER — OFFICE VISIT (OUTPATIENT)
Dept: OBSTETRICS AND GYNECOLOGY | Facility: CLINIC | Age: 23
End: 2018-07-19

## 2018-07-19 VITALS
HEIGHT: 69 IN | SYSTOLIC BLOOD PRESSURE: 104 MMHG | WEIGHT: 146.8 LBS | DIASTOLIC BLOOD PRESSURE: 78 MMHG | BODY MASS INDEX: 21.74 KG/M2

## 2018-07-19 DIAGNOSIS — Z13.9 SCREENING FOR CONDITION: Primary | ICD-10-CM

## 2018-07-19 DIAGNOSIS — N63.10 BREAST MASS, RIGHT: ICD-10-CM

## 2018-07-19 DIAGNOSIS — Z11.51 SPECIAL SCREENING EXAMINATION FOR HUMAN PAPILLOMAVIRUS (HPV): ICD-10-CM

## 2018-07-19 DIAGNOSIS — Z01.419 PAP SMEAR, LOW-RISK: ICD-10-CM

## 2018-07-19 LAB
B-HCG UR QL: NEGATIVE
BILIRUB BLD-MCNC: NEGATIVE MG/DL
CLARITY, POC: CLEAR
COLOR UR: YELLOW
GLUCOSE UR STRIP-MCNC: NEGATIVE MG/DL
INTERNAL NEGATIVE CONTROL: NEGATIVE
INTERNAL POSITIVE CONTROL: POSITIVE
KETONES UR QL: NEGATIVE
LEUKOCYTE EST, POC: NEGATIVE
Lab: NORMAL
NITRITE UR-MCNC: NEGATIVE MG/ML
PH UR: 5 [PH] (ref 5–8)
PROT UR STRIP-MCNC: NEGATIVE MG/DL
RBC # UR STRIP: NEGATIVE /UL
SP GR UR: 1 (ref 1–1.03)
UROBILINOGEN UR QL: NORMAL

## 2018-07-19 PROCEDURE — 81025 URINE PREGNANCY TEST: CPT | Performed by: OBSTETRICS & GYNECOLOGY

## 2018-07-19 PROCEDURE — 81002 URINALYSIS NONAUTO W/O SCOPE: CPT | Performed by: OBSTETRICS & GYNECOLOGY

## 2018-07-19 PROCEDURE — 99395 PREV VISIT EST AGE 18-39: CPT | Performed by: OBSTETRICS & GYNECOLOGY

## 2018-07-19 RX ORDER — DROSPIRENONE AND ETHINYL ESTRADIOL 0.02-3(28)
1 KIT ORAL DAILY
Qty: 63 TABLET | Refills: 4 | Status: SHIPPED | OUTPATIENT
Start: 2018-07-19 | End: 2019-04-22 | Stop reason: SDUPTHER

## 2018-07-19 NOTE — PROGRESS NOTES
GYN Annual Exam     CC- Here for annual exam.     Humberto Gold is a 22 y.o. female who presents for annual well woman exam. Periods are regular every 28-30 days, lasting 3 days. Dysmenorrhea:severe, occurring premenstrually. Cyclic symptoms include none. No intermenstrual bleeding, spotting, or discharge.  Patient is sexually active  yes - boyfriend . Patient is satisfied with her contraception yes - OCPs.     OB History      Para Term  AB Living    0 0 0 0 0 0    SAB TAB Ectopic Molar Multiple Live Births    0 0 0   0            Current contraception: OCP (estrogen/progesterone)  History of abnormal Pap smear: no  History of abnormal mammogram: no  Family history of uterine, colon or ovarian cancer: no  Family history of breast cancer: no    Health Maintenance   Topic Date Due   • HPV VACCINES (1 of 3 - Female 3 Dose Series) 2006   • ANNUAL PHYSICAL  2018   • INFLUENZA VACCINE  2018   • PAP SMEAR  2020   • TDAP/TD VACCINES (2 - Td) 2022   • MENINGOCOCCAL VACCINE (Normal Risk)  Aged Out       Past Medical History:   Diagnosis Date   • Acne    • Asthma    • Heart murmur        No past surgical history on file.      Current Outpatient Prescriptions:   •  albuterol (PROVENTIL HFA;VENTOLIN HFA) 108 (90 BASE) MCG/ACT inhaler, Inhale 2 puffs Every 4 (Four) Hours As Needed for Wheezing., Disp: 1 inhaler, Rfl: 6  •  budesonide-formoterol (SYMBICORT) 80-4.5 MCG/ACT inhaler, Inhale 2 puffs 2 (Two) Times a Day., Disp: 10.2 g, Rfl: 3  •  busPIRone (BUSPAR) 5 MG tablet, Take 1 tablet by mouth 2 (Two) Times a Day As Needed (Anxiety)., Disp: 60 tablet, Rfl: 0  •  drospirenone-ethinyl estradiol (BEV,GIANVI) 3-0.02 MG per tablet, Take 1 tablet by mouth Daily., Disp: 84 tablet, Rfl: 3    Current Facility-Administered Medications:   •  albuterol (PROVENTIL) nebulizer solution 0.083% 2.5 mg/3mL, 2.5 mg, Nebulization, Q6H PRN, Corina Cuenca MD    Allergies   Allergen Reactions   • Peanuts  "[Peanut Oil] Anaphylaxis   • Penicillins Anaphylaxis       Social History   Substance Use Topics   • Smoking status: Never Smoker   • Smokeless tobacco: Never Used   • Alcohol use Yes      Comment: social       Family History   Problem Relation Age of Onset   • Heart disease Paternal Grandfather    • Diabetes type II Paternal Grandfather        Review of Systems    /78   Ht 175.3 cm (69\")   Wt 66.6 kg (146 lb 12.8 oz)   LMP 07/10/2018 (Exact Date)   Breastfeeding? No   BMI 21.68 kg/m²     Physical Exam   Constitutional: She is oriented to person, place, and time. She appears well-developed and well-nourished.   HENT:   Mouth/Throat: Normal dentition. No dental caries.   Cardiovascular: Normal rate and regular rhythm.    Pulmonary/Chest: Effort normal and breath sounds normal. Right breast exhibits no inverted nipple, no mass, no nipple discharge, no skin change and no tenderness. Left breast exhibits no inverted nipple, no mass, no nipple discharge, no skin change and no tenderness.       3cm mass palpated at 12 oclock position   Abdominal: Soft. She exhibits no distension and no mass. There is no tenderness.   Genitourinary: There is no rash, tenderness or lesion on the right labia. There is no rash, tenderness or lesion on the left labia. Uterus is not deviated, not enlarged, not fixed and not tender. Cervix exhibits no motion tenderness, no discharge and no friability. Right adnexum displays no mass, no tenderness and no fullness. Left adnexum displays no mass, no tenderness and no fullness. No tenderness or bleeding in the vagina. No vaginal discharge found.   Neurological: She is alert and oriented to person, place, and time.   Psychiatric: She has a normal mood and affect. Her behavior is normal. Judgment and thought content normal.   Vitals reviewed.         Assessment/Plan    1) GYN HM: Check pap smear.  SBE demonstrated and encouraged.  2) STD screening: GCCT.   3) Contraception: OCPs- refills " given.  4) Severe dysmenorrhea: Discussed taking OCps continuously  5) Diet and Exercise discussed  6) Smoking Status: nonsmoker  7) Social: Goes to [employer]  8) Right breast mass: Check breast US  9) Follow up prn and 1 year       Diagnoses and all orders for this visit:    Screening for condition  -     POC Urinalysis Dipstick  -     POC Pregnancy, Urine    Special screening examination for human papillomavirus (HPV)  -     Pap IG, Ct-Ng TV Rfx HPV ASCU    Pap smear, low-risk  -     Pap IG, Ct-Ng TV Rfx HPV ASCU          Gema Sotelo,   7/19/2018  1:09 PM

## 2018-07-25 LAB
C TRACH RRNA CVX QL NAA+PROBE: NEGATIVE
CONV .: ABNORMAL
CYTOLOGIST CVX/VAG CYTO: ABNORMAL
CYTOLOGY CVX/VAG DOC THIN PREP: ABNORMAL
DX ICD CODE: ABNORMAL
DX ICD CODE: ABNORMAL
HIV 1 & 2 AB SER-IMP: ABNORMAL
N GONORRHOEA RRNA CVX QL NAA+PROBE: NEGATIVE
OTHER STN SPEC: ABNORMAL
PATH REPORT.FINAL DX SPEC: ABNORMAL
PATHOLOGIST CVX/VAG CYTO: ABNORMAL
RECOM F/U CVX/VAG CYTO: ABNORMAL
STAT OF ADQ CVX/VAG CYTO-IMP: ABNORMAL
T VAGINALIS RRNA SPEC QL NAA+PROBE: NEGATIVE

## 2018-07-27 ENCOUNTER — TELEPHONE (OUTPATIENT)
Dept: OBSTETRICS AND GYNECOLOGY | Facility: CLINIC | Age: 23
End: 2018-07-27

## 2018-07-27 ENCOUNTER — HOSPITAL ENCOUNTER (OUTPATIENT)
Dept: ULTRASOUND IMAGING | Facility: HOSPITAL | Age: 23
Discharge: HOME | End: 2018-07-27
Attending: OBSTETRICS & GYNECOLOGY | Admitting: OBSTETRICS & GYNECOLOGY

## 2018-07-27 DIAGNOSIS — N63.10 BREAST MASS, RIGHT: ICD-10-CM

## 2018-07-27 PROCEDURE — 76641 ULTRASOUND BREAST COMPLETE: CPT

## 2018-07-28 RX ORDER — METRONIDAZOLE 500 MG/1
500 TABLET ORAL 2 TIMES DAILY
Qty: 14 TABLET | Refills: 0 | Status: SHIPPED | OUTPATIENT
Start: 2018-07-28 | End: 2018-08-04

## 2018-07-31 DIAGNOSIS — N63.10 BREAST MASS, RIGHT: Primary | ICD-10-CM

## 2018-08-02 NOTE — TELEPHONE ENCOUNTER
Called pt as Dr. CEDEÑO asked yesterday, and the day before. Left voicemail to return my call for this info at earliest convenience.

## 2018-08-07 ENCOUNTER — HOSPITAL ENCOUNTER (OUTPATIENT)
Dept: ULTRASOUND IMAGING | Facility: HOSPITAL | Age: 23
Discharge: HOME | End: 2018-08-07
Attending: OBSTETRICS & GYNECOLOGY | Admitting: OBSTETRICS & GYNECOLOGY

## 2018-08-07 ENCOUNTER — HOSPITAL ENCOUNTER (OUTPATIENT)
Dept: MAMMOGRAPHY | Facility: HOSPITAL | Age: 23
Discharge: HOME | End: 2018-08-07

## 2018-08-07 DIAGNOSIS — IMO0002 MASS: ICD-10-CM

## 2018-08-07 DIAGNOSIS — N63.10 BREAST MASS, RIGHT: ICD-10-CM

## 2018-08-07 RX ORDER — LIDOCAINE HYDROCHLORIDE 10 MG/ML
5 INJECTION, SOLUTION INFILTRATION; PERINEURAL ONCE
Status: COMPLETED | OUTPATIENT
Start: 2018-08-07 | End: 2018-08-07

## 2018-08-07 RX ADMIN — LIDOCAINE HYDROCHLORIDE 5 ML: 10 INJECTION, SOLUTION INFILTRATION; PERINEURAL at 11:42

## 2018-08-10 LAB
LAB AP CASE REPORT: NORMAL
PATH REPORT.FINAL DX SPEC: NORMAL

## 2018-10-08 RX ORDER — SERTRALINE HYDROCHLORIDE 25 MG/1
25 TABLET, FILM COATED ORAL DAILY
Qty: 30 TABLET | Refills: 0 | Status: SHIPPED | OUTPATIENT
Start: 2018-10-08 | End: 2018-11-04 | Stop reason: SDUPTHER

## 2018-10-22 RX ORDER — DILTIAZEM HYDROCHLORIDE 60 MG/1
TABLET, FILM COATED ORAL
Qty: 3 INHALER | Refills: 0 | Status: SHIPPED | OUTPATIENT
Start: 2018-10-22 | End: 2019-02-22 | Stop reason: SDUPTHER

## 2018-11-05 RX ORDER — SERTRALINE HYDROCHLORIDE 25 MG/1
TABLET, FILM COATED ORAL
Qty: 90 TABLET | Refills: 1 | Status: SHIPPED | OUTPATIENT
Start: 2018-11-05 | End: 2019-04-11

## 2018-11-07 ENCOUNTER — TELEPHONE (OUTPATIENT)
Dept: INTERNAL MEDICINE | Facility: CLINIC | Age: 23
End: 2018-11-07

## 2018-11-08 NOTE — TELEPHONE ENCOUNTER
----- Message from Corina Cuenca MD sent at 11/7/2018  7:12 PM EST -----  Regarding: RE:  Thanks for letting me know.  ----- Message -----  From: Maeve Núñez MA  Sent: 11/7/2018   7:09 PM  To: Corina Cuenca MD    Patient states that she did get the refill you sent on 11/5, but she is actually going to get this through [facility] psychiatrist (she can't remember his name) from now on.  ----- Message -----  From: Gaye Chaidez MA  Sent: 11/5/2018   5:58 PM  To: Maeve Núñez MA        ----- Message -----  From: Corina Cuenca MD  Sent: 11/5/2018  12:59 PM  To: Gaye Chaidez MA    Thank you. That's what I thought too, but I can't see the documentation anywhere.   ----- Message -----  From: Gaye Chaidez MA  Sent: 11/5/2018  12:47 PM  To: Corina Cuenca MD    I WAS THINKING  [facility] WAS GOING TO TAKE CARE OF THIS, BUT NOT SURE,  I LEFT MESSAGE FOR PT TO RETURN CALL TO LET US KNOW.   THANKS  ----- Message -----  From: Corina Cuenca MD  Sent: 11/5/2018   8:15 AM  To: Gaye Chaidez MA    Remind me about our discussion with her Zoloft? I am filling or her psychiatrist in [place]. I couldn't remember. I keep getting request and wanted to make sure that I need to sign them.

## 2018-11-14 ENCOUNTER — OFFICE VISIT (OUTPATIENT)
Dept: INTERNAL MEDICINE | Facility: CLINIC | Age: 23
End: 2018-11-14

## 2018-11-14 VITALS
HEIGHT: 69 IN | RESPIRATION RATE: 16 BRPM | HEART RATE: 84 BPM | DIASTOLIC BLOOD PRESSURE: 84 MMHG | BODY MASS INDEX: 22.31 KG/M2 | OXYGEN SATURATION: 98 % | SYSTOLIC BLOOD PRESSURE: 128 MMHG | WEIGHT: 150.6 LBS

## 2018-11-14 DIAGNOSIS — J45.30 MILD PERSISTENT ASTHMA WITHOUT COMPLICATION: Primary | ICD-10-CM

## 2018-11-14 DIAGNOSIS — J45.902 ASTHMA WITH STATUS ASTHMATICUS, UNSPECIFIED ASTHMA SEVERITY, UNSPECIFIED WHETHER PERSISTENT: ICD-10-CM

## 2018-11-14 PROBLEM — J45.909 ASTHMA: Status: ACTIVE | Noted: 2018-11-14

## 2018-11-14 PROCEDURE — 99214 OFFICE O/P EST MOD 30 MIN: CPT | Performed by: INTERNAL MEDICINE

## 2018-11-14 RX ORDER — ALBUTEROL SULFATE 90 UG/1
2 AEROSOL, METERED RESPIRATORY (INHALATION) EVERY 4 HOURS PRN
Qty: 1 INHALER | Refills: 6 | Status: SHIPPED | OUTPATIENT
Start: 2018-11-14 | End: 2019-02-03

## 2018-11-14 RX ORDER — ALBUTEROL SULFATE 2.5 MG/3ML
2.5 SOLUTION RESPIRATORY (INHALATION) EVERY 6 HOURS PRN
Status: DISCONTINUED | OUTPATIENT
Start: 2018-11-14 | End: 2019-05-16

## 2018-11-14 RX ORDER — METHYLPREDNISOLONE 4 MG/1
TABLET ORAL
Qty: 1 EACH | Refills: 0 | Status: SHIPPED | OUTPATIENT
Start: 2018-11-14 | End: 2019-02-03

## 2018-11-14 NOTE — PROGRESS NOTES
Humberto Gold is a 23 y.o. female, who presents with a chief complaint of   Chief Complaint   Patient presents with   • Asthma       HPI     24 yo female with pmhx asthma currently on symbicort, by history moderate asthma triggered by cold. Having more chest tightness and dyspnea last few days.      No recent viral illness, living in [facility] on [address]. Restarting at , now as sophomore.     She is using her albuterol more without result          The following portions of the patient's history were reviewed and updated as appropriate: allergies, current medications, past family history, past medical history, past social history, past surgical history and problem list.    Allergies: Peanuts [peanut oil] and Penicillins    Current Outpatient Medications:   •  albuterol (PROVENTIL HFA;VENTOLIN HFA) 108 (90 Base) MCG/ACT inhaler, Inhale 2 puffs Every 4 (Four) Hours As Needed for Wheezing., Disp: 1 inhaler, Rfl: 6  •  busPIRone (BUSPAR) 5 MG tablet, Take 1 tablet by mouth 2 (Two) Times a Day As Needed (Anxiety)., Disp: 60 tablet, Rfl: 0  •  drospirenone-ethinyl estradiol (BEV,GIANVI) 3-0.02 MG per tablet, Take 1 tablet by mouth Daily., Disp: 63 tablet, Rfl: 4  •  sertraline (ZOLOFT) 25 MG tablet, TAKE ONE TABLET BY MOUTH DAILY, Disp: 90 tablet, Rfl: 1  •  SYMBICORT 80-4.5 MCG/ACT inhaler, INHALE TWO PUFFS BY MOUTH TWICE A DAY, Disp: 3 inhaler, Rfl: 0  •  MethylPREDNISolone (MEDROL, EDEN,) 4 MG tablet, Take as directed on package instructions., Disp: 1 each, Rfl: 0    Current Facility-Administered Medications:   •  albuterol (PROVENTIL) nebulizer solution 0.083% 2.5 mg/3mL, 2.5 mg, Nebulization, Q6H PRN, Corina Cuenca MD  •  albuterol (PROVENTIL) nebulizer solution 0.083% 2.5 mg/3mL, 2.5 mg, Nebulization, Q6H PRN, Mitch Rascon MD  Medications Discontinued During This Encounter   Medication Reason   • albuterol (PROVENTIL HFA;VENTOLIN HFA) 108 (90 BASE) MCG/ACT inhaler Reorder       Review of Systems  "  Constitutional: Negative for appetite change, fatigue, fever and unexpected weight change.   HENT: Negative for ear pain, sinus pressure and trouble swallowing.    Respiratory: Positive for cough and shortness of breath. Negative for chest tightness.    Cardiovascular: Negative for chest pain, palpitations and leg swelling.   Gastrointestinal: Negative for constipation and diarrhea.   Genitourinary: Negative for dysuria, frequency, hematuria, pelvic pain and vaginal discharge.   Musculoskeletal: Negative.    Skin: Negative.    Neurological: Negative for dizziness, light-headedness and headaches.   Hematological: Negative.    Psychiatric/Behavioral: Negative.              /84   Pulse 84   Resp 16   Ht 175.3 cm (69\")   Wt 68.3 kg (150 lb 9.6 oz)   SpO2 98%   BMI 22.24 kg/m²       Physical Exam   Constitutional: She is oriented to person, place, and time. She appears well-developed and well-nourished.   HENT:   Head: Normocephalic and atraumatic.   Right Ear: External ear normal.   Left Ear: External ear normal.   Eyes: EOM are normal. Pupils are equal, round, and reactive to light.   Neck: Normal range of motion. Neck supple. No thyromegaly present.   Cardiovascular: Normal rate, regular rhythm and normal heart sounds.   No murmur heard.  Pulmonary/Chest: Effort normal and breath sounds normal. No respiratory distress. She has no wheezes.   Poor excursion  Post nebulizer much improved.   Oxygen 98%RA   Abdominal: Soft.   Musculoskeletal: She exhibits no edema.   Neurological: She is alert and oriented to person, place, and time.   Skin: Skin is warm and dry. Capillary refill takes less than 2 seconds.   Psychiatric: She has a normal mood and affect. Her behavior is normal.   Vitals reviewed.      Lab Results (most recent)     None          Results for orders placed or performed during the hospital encounter of 08/07/18   Tissue Pathology Exam   Result Value Ref Range    Case Report       Surgical " Pathology Report                         Case: [id number]                                  Authorizing Provider:  Gema Sotelo DO       Collected:           2018 11:20 AM          Ordering Location:     [facility]   Received:            2018 11:59 AM                                 [facility]                                                                   Pathologist:           Lizzette Wilkinson MD                                                          Specimen:    Breast, Right                                                                              Final Diagnosis       1. Breast, Right, Tissue Pathology Exam:                 Humberto was seen today for asthma.    Diagnoses and all orders for this visit:    Mild persistent asthma without complication  -     albuterol (PROVENTIL) nebulizer solution 0.083% 2.5 mg/3mL; Take 2.5 mg by nebulization Every 6 (Six) Hours As Needed for Shortness of Air.  -     Influenza Vac Subunit Quad (FLUCELVAX) injection 0.5 mL; Inject 0.5 mL into the appropriate muscle as directed by prescriber 1 (One) Time.  -     albuterol (PROVENTIL HFA;VENTOLIN HFA) 108 (90 Base) MCG/ACT inhaler; Inhale 2 puffs Every 4 (Four) Hours As Needed for Wheezing.  -     MethylPREDNISolone (MEDROL, EDEN,) 4 MG tablet; Take as directed on package instructions.    Asthma with status asthmaticus, unspecified asthma severity, unspecified whether persistent      Refilled albuterol, will not work if   Continue to use symbicort 2 puff twice daily  Medrol  Albuterol for rescue  Please seek medical attention close to home if necessary for continued trouble breathing    Spent 30 min in care of patient.   Return in about 3 months (around 2019).    Mitch Rascon MD  2018

## 2019-01-27 PROCEDURE — 87086 URINE CULTURE/COLONY COUNT: CPT | Performed by: FAMILY MEDICINE

## 2019-01-29 ENCOUNTER — TELEPHONE (OUTPATIENT)
Dept: URGENT CARE | Facility: CLINIC | Age: 24
End: 2019-01-29

## 2019-01-30 ENCOUNTER — TELEPHONE (OUTPATIENT)
Dept: URGENT CARE | Facility: CLINIC | Age: 24
End: 2019-01-30

## 2019-01-30 NOTE — TELEPHONE ENCOUNTER
Informed patient of negative results.  Feeling much better.  Advised to finish medications and follow up with pcp if symptoms persist.  FF 1/29/19

## 2019-02-22 RX ORDER — DILTIAZEM HYDROCHLORIDE 60 MG/1
TABLET, FILM COATED ORAL
Qty: 10.2 G | Refills: 5 | Status: SHIPPED | OUTPATIENT
Start: 2019-02-22 | End: 2019-03-25 | Stop reason: SDUPTHER

## 2019-03-25 RX ORDER — DILTIAZEM HYDROCHLORIDE 60 MG/1
TABLET, FILM COATED ORAL
Qty: 10.2 G | Refills: 5 | Status: SHIPPED | OUTPATIENT
Start: 2019-03-25 | End: 2019-04-30 | Stop reason: SDUPTHER

## 2019-04-11 ENCOUNTER — OFFICE VISIT (OUTPATIENT)
Dept: INTERNAL MEDICINE | Facility: CLINIC | Age: 24
End: 2019-04-11

## 2019-04-11 VITALS
OXYGEN SATURATION: 98 % | HEIGHT: 69 IN | DIASTOLIC BLOOD PRESSURE: 70 MMHG | HEART RATE: 70 BPM | WEIGHT: 147 LBS | BODY MASS INDEX: 21.77 KG/M2 | SYSTOLIC BLOOD PRESSURE: 120 MMHG | TEMPERATURE: 98.5 F | RESPIRATION RATE: 16 BRPM

## 2019-04-11 DIAGNOSIS — J45.30 MILD PERSISTENT ASTHMA WITHOUT COMPLICATION: Primary | ICD-10-CM

## 2019-04-11 DIAGNOSIS — K29.50 CHRONIC GASTRITIS WITHOUT BLEEDING, UNSPECIFIED GASTRITIS TYPE: ICD-10-CM

## 2019-04-11 DIAGNOSIS — Z91.010 PEANUT ALLERGY: ICD-10-CM

## 2019-04-11 PROCEDURE — 99214 OFFICE O/P EST MOD 30 MIN: CPT | Performed by: INTERNAL MEDICINE

## 2019-04-11 RX ORDER — PREDNISONE 10 MG/1
TABLET ORAL
Qty: 30 TABLET | Refills: 0 | Status: SHIPPED | OUTPATIENT
Start: 2019-04-11 | End: 2019-05-16

## 2019-04-11 RX ORDER — OMEPRAZOLE 20 MG/1
20 CAPSULE, DELAYED RELEASE ORAL DAILY
Qty: 30 CAPSULE | Refills: 1 | Status: SHIPPED | OUTPATIENT
Start: 2019-04-11 | End: 2019-05-16

## 2019-04-11 RX ORDER — EPINEPHRINE 0.3 MG/.3ML
0.3 INJECTION SUBCUTANEOUS ONCE
Qty: 1 EACH | Refills: 0 | Status: SHIPPED | OUTPATIENT
Start: 2019-04-11 | End: 2019-04-11

## 2019-04-11 NOTE — PATIENT INSTRUCTIONS
Food Choices for Gastroesophageal Reflux Disease, Adult  When you have gastroesophageal reflux disease (GERD), the foods you eat and your eating habits are very important. Choosing the right foods can help ease your discomfort. Think about working with a nutrition specialist (dietitian) to help you make good choices.  What are tips for following this plan?  Meals  · Choose healthy foods that are low in fat, such as fruits, vegetables, whole grains, low-fat dairy products, and lean meat, fish, and poultry.  · Eat small meals often instead of 3 large meals a day. Eat your meals slowly, and in a place where you are relaxed. Avoid bending over or lying down until 2-3 hours after eating.  · Avoid eating meals 2-3 hours before bed.  · Avoid drinking a lot of liquid with meals.  · Cook foods using methods other than frying. Bake, grill, or broil food instead.  · Avoid or limit:  ? Chocolate.  ? Peppermint or spearmint.  ? Alcohol.  ? Pepper.  ? Black and decaffeinated coffee.  ? Black and decaffeinated tea.  ? Bubbly (carbonated) soft drinks.  ? Caffeinated energy drinks and soft drinks.  · Limit high-fat foods such as:  ? Fatty meat or fried foods.  ? Whole milk, cream, butter, or ice cream.  ? Nuts and nut butters.  ? Pastries, donuts, and sweets made with butter or shortening.  · Avoid foods that cause symptoms. These foods may be different for everyone. Common foods that cause symptoms include:  ? Tomatoes.  ? Oranges, lilia, and limes.  ? Peppers.  ? Spicy food.  ? Onions and garlic.  ? Vinegar.  Lifestyle    · Maintain a healthy weight. Ask your doctor what weight is healthy for you. If you need to lose weight, work with your doctor to do so safely.  · Exercise for at least 30 minutes for 5 or more days each week, or as told by your doctor.  · Wear loose-fitting clothes.  · Do not smoke. If you need help quitting, ask your doctor.  · Sleep with the head of your bed higher than your feet. Use a wedge under the  mattress or blocks under the bed frame to raise the head of the bed.  Summary  · When you have gastroesophageal reflux disease (GERD), food and lifestyle choices are very important in easing your symptoms.  · Eat small meals often instead of 3 large meals a day. Eat your meals slowly, and in a place where you are relaxed.  · Limit high-fat foods such as fatty meat or fried foods.  · Avoid bending over or lying down until 2-3 hours after eating.  · Avoid peppermint and spearmint, caffeine, alcohol, and chocolate.  This information is not intended to replace advice given to you by your health care provider. Make sure you discuss any questions you have with your health care provider.  Document Released: 06/18/2013 Document Revised: 01/23/2018 Document Reviewed: 01/23/2018  [employer] Interactive Patient Education © 2019 [employer]

## 2019-04-11 NOTE — PROGRESS NOTES
Humberto Gold is a 23 y.o. female, who presents with a chief complaint of   Chief Complaint   Patient presents with   • Asthma   • Nausea     after eating foods that she has for x years, causing new issues        24 yo F here for acute visit and she has been having asthma issues. She went a [employer] restaurant in 2/2019 and accidentally ingested peanuts. Had itching of her tongue and no breathing issues. One episode of vomiting.Took Benadryl and that helped. Her asthma flared a few days after that and has been an issue.  She does not have an Epi Pen. She is taking Symbicort twice per day. Using albuterol every day or more. She had a round of steroids in mid March and that helped her breathing.     She feels nauseous every time she eats since that time as well. BM's are normal. Her stomach huts in the epigastric. No blood in her stool.          The following portions of the patient's history were reviewed and updated as appropriate: allergies, current medications, past family history, past medical history, past social history, past surgical history and problem list.    Allergies: Peanuts [peanut oil] and Penicillins    Current Outpatient Medications:   •  Cetirizine HCl (ZYRTEC PO), Take  by mouth As Needed., Disp: , Rfl:   •  drospirenone-ethinyl estradiol (BEV,GIANVI) 3-0.02 MG per tablet, Take 1 tablet by mouth Daily., Disp: 63 tablet, Rfl: 4  •  SYMBICORT 80-4.5 MCG/ACT inhaler, INHALE TWO PUFFS BY MOUTH TWICE A DAY, Disp: 10.2 g, Rfl: 5  •  omeprazole (PRILOSEC) 20 MG capsule, Take 1 capsule by mouth Daily., Disp: 30 capsule, Rfl: 1  •  predniSONE (DELTASONE) 10 MG tablet, 50mg PO Qam x 2 days, 40mg PO Qam x 2 days, 30mg PO Qam x 2 days, 20mg PO Qam x 2 days, 10mg PO Qam x 2 days,, Disp: 30 tablet, Rfl: 0    Current Facility-Administered Medications:   •  albuterol (PROVENTIL) nebulizer solution 0.083% 2.5 mg/3mL, 2.5 mg, Nebulization, Q6H PRN, Corina Cuenca MD  •  albuterol (PROVENTIL) nebulizer  "solution 0.083% 2.5 mg/3mL, 2.5 mg, Nebulization, Q6H PRN, Mitch Rascon MD  Medications Discontinued During This Encounter   Medication Reason   • ondansetron ODT (ZOFRAN-ODT) 4 MG disintegrating tablet *Therapy completed   • sertraline (ZOLOFT) 25 MG tablet *Therapy completed       Review of Systems   Constitutional: Negative for fatigue.   Respiratory: Positive for cough. Negative for shortness of breath.    Gastrointestinal: Positive for abdominal pain and nausea. Negative for constipation.   Neurological: Negative for dizziness and headaches.             /70 (BP Location: Left arm, Patient Position: Sitting, Cuff Size: Adult)   Pulse 70   Temp 98.5 °F (36.9 °C) (Oral)   Resp 16   Ht 175.3 cm (69\")   Wt 66.7 kg (147 lb)   SpO2 98%   BMI 21.71 kg/m²       Physical Exam   Constitutional: She is oriented to person, place, and time. She appears well-developed and well-nourished. No distress.   HENT:   Head: Normocephalic and atraumatic.   Right Ear: External ear normal.   Left Ear: External ear normal.   Mouth/Throat: Oropharynx is clear and moist. No oropharyngeal exudate.   Eyes: Conjunctivae are normal. Right eye exhibits no discharge. Left eye exhibits no discharge. No scleral icterus.   Neck: Neck supple.   Cardiovascular: Normal rate, regular rhythm and normal heart sounds. Exam reveals no gallop and no friction rub.   No murmur heard.  Pulmonary/Chest: Effort normal. No respiratory distress. She has decreased breath sounds. She has no wheezes. She has no rales.   Abdominal: Soft. Bowel sounds are normal. She exhibits no distension and no mass. There is no tenderness. There is no guarding.   Lymphadenopathy:     She has no cervical adenopathy.   Neurological: She is alert and oriented to person, place, and time.   Skin: Skin is warm. No rash noted.   Psychiatric: She has a normal mood and affect. Her behavior is normal.   Nursing note and vitals reviewed.        Results for orders placed or " performed during the hospital encounter of 01/27/19   Urine Culture - Urine, Urine, Clean Catch   Result Value Ref Range    Urine Culture No growth at 2 days    POC Urinalysis Dipstick, Multipro (Automated dipstick)   Result Value Ref Range    Color Yellow Yellow, Straw, Dark Yellow, Priscila    Clarity, UA Cloudy (A) Clear    Glucose, UA Negative Negative, 1000 mg/dL (3+) mg/dL    Bilirubin 1 mg/dL (A) Negative    Ketones, UA 15 mg/dL (A) Negative    Specific Gravity  1.010 1.005 - 1.030    Blood, UA Negative Negative    pH, Urine 7.0 5.0 - 8.0    Protein, POC Trace (A) Negative mg/dL    Urobilinogen, UA 1 E.U./dL  (A) Normal    Nitrite, UA Negative Negative    Leukocytes 500 Chava/ul (A) Negative           Humberto was seen today for asthma and nausea.    Diagnoses and all orders for this visit:    Mild persistent asthma without complication  -     Full Pulmonary Function Test With Bronchodilator    Peanut allergy    Chronic gastritis without bleeding, unspecified gastritis type    Other orders  -     predniSONE (DELTASONE) 10 MG tablet; 50mg PO Qam x 2 days, 40mg PO Qam x 2 days, 30mg PO Qam x 2 days, 20mg PO Qam x 2 days, 10mg PO Qam x 2 days,  -     omeprazole (PRILOSEC) 20 MG capsule; Take 1 capsule by mouth Daily.  -     EPINEPHrine (EPIPEN 2-EDEN) 0.3 MG/0.3ML solution auto-injector injection; Inject 0.3 mL into the appropriate muscle as directed by prescriber 1 (One) Time for 1 dose.      Needs Epi Pen due to peanut allergy and have sent for her     I think that she is having a mild asthma flair related to allergies and possible reaction that she had months ago. Will start on steroid taper and use albuterol as needed. Repeat PFT's and will see back afterwards.     Start PPI in meantime to see if this helps nausea. If not, we will need to explore other causes.     Return in about 5 weeks (around 5/16/2019) for Recheck.    Corina Cuenca MD  04/11/2019

## 2019-04-16 ENCOUNTER — HOSPITAL ENCOUNTER (OUTPATIENT)
Dept: PULMONOLOGY | Facility: HOSPITAL | Age: 24
Discharge: HOME | End: 2019-04-16
Admitting: INTERNAL MEDICINE

## 2019-04-16 PROCEDURE — 94010 BREATHING CAPACITY TEST: CPT | Performed by: INTERNAL MEDICINE

## 2019-04-16 PROCEDURE — 94010 BREATHING CAPACITY TEST: CPT

## 2019-04-16 PROCEDURE — 94729 DIFFUSING CAPACITY: CPT | Performed by: INTERNAL MEDICINE

## 2019-04-16 PROCEDURE — 94727 GAS DIL/WSHOT DETER LNG VOL: CPT | Performed by: INTERNAL MEDICINE

## 2019-04-16 PROCEDURE — 94727 GAS DIL/WSHOT DETER LNG VOL: CPT

## 2019-04-16 PROCEDURE — 94729 DIFFUSING CAPACITY: CPT

## 2019-04-22 RX ORDER — ETHINYL ESTRADIOL/DROSPIRENONE 0.02-3(28)
TABLET ORAL
Qty: 84 TABLET | Refills: 1 | Status: SHIPPED | OUTPATIENT
Start: 2019-04-22 | End: 2019-09-16 | Stop reason: SDUPTHER

## 2019-04-30 RX ORDER — DILTIAZEM HYDROCHLORIDE 60 MG/1
TABLET, FILM COATED ORAL
Qty: 10.2 G | Refills: 5 | OUTPATIENT
Start: 2019-04-30 | End: 2020-01-21

## 2019-05-16 ENCOUNTER — OFFICE VISIT (OUTPATIENT)
Dept: INTERNAL MEDICINE | Facility: CLINIC | Age: 24
End: 2019-05-16

## 2019-05-16 VITALS
HEART RATE: 84 BPM | TEMPERATURE: 98.6 F | WEIGHT: 147 LBS | SYSTOLIC BLOOD PRESSURE: 114 MMHG | RESPIRATION RATE: 14 BRPM | DIASTOLIC BLOOD PRESSURE: 72 MMHG | OXYGEN SATURATION: 99 % | HEIGHT: 69 IN | BODY MASS INDEX: 21.77 KG/M2

## 2019-05-16 DIAGNOSIS — R11.0 NAUSEA: ICD-10-CM

## 2019-05-16 DIAGNOSIS — J45.902 MODERATE ASTHMA WITH STATUS ASTHMATICUS, UNSPECIFIED WHETHER PERSISTENT: Primary | ICD-10-CM

## 2019-05-16 PROCEDURE — 99213 OFFICE O/P EST LOW 20 MIN: CPT | Performed by: INTERNAL MEDICINE

## 2019-05-16 RX ORDER — PROPRANOLOL HYDROCHLORIDE 10 MG/1
10 TABLET ORAL AS NEEDED
COMMUNITY
Start: 2019-05-03 | End: 2019-09-16

## 2019-05-16 RX ORDER — BACLOFEN 10 MG/1
10 TABLET ORAL AS NEEDED
COMMUNITY
Start: 2019-05-03 | End: 2019-09-16

## 2019-05-16 NOTE — PROGRESS NOTES
Humberto Gold is a 23 y.o. female, who presents with a chief complaint of   Chief Complaint   Patient presents with   • Follow-up     5 x week f/u   • Asthma       24 yo F here for follow up of her asthma. She is doing well on Symbicort and I reviewed her PFTs. She is taking albuterol a few times per week, but not chronically. Seeing allergist next week. Beta blocker has bothered asthma when she takes as needed.     She feels that her stomach issues are related to lactose. She has tried to avoid this and feels better.          The following portions of the patient's history were reviewed and updated as appropriate: allergies, current medications, past family history, past medical history, past social history, past surgical history and problem list.    Allergies: Peanuts [peanut oil] and Penicillins    Current Outpatient Medications:   •  Amphetamine-Dextroamphetamine (AMPHETAMINE SALT COMBO PO), 10 mg., Disp: , Rfl:   •  baclofen (LIORESAL) 10 MG tablet, Take 10 mg by mouth As Needed., Disp: , Rfl:   •  Cetirizine HCl (ZYRTEC PO), Take  by mouth As Needed., Disp: , Rfl:   •  [place] 3-0.02 MG per tablet, TAKE ONE TABLET BY MOUTH DAILY, Disp: 84 tablet, Rfl: 1  •  propranolol (INDERAL) 10 MG tablet, Take 10 mg by mouth As Needed., Disp: , Rfl:   •  sertraline (ZOLOFT) 50 MG tablet, Daily., Disp: , Rfl:   •  SYMBICORT 80-4.5 MCG/ACT inhaler, INHALE TWO PUFFS BY MOUTH TWICE A DAY, Disp: 10.2 g, Rfl: 5  No current facility-administered medications for this visit.   Medications Discontinued During This Encounter   Medication Reason   • predniSONE (DELTASONE) 10 MG tablet *Therapy completed   • albuterol (PROVENTIL) nebulizer solution 0.083% 2.5 mg/3mL    • albuterol (PROVENTIL) nebulizer solution 0.083% 2.5 mg/3mL    • omeprazole (PRILOSEC) 20 MG capsule *Therapy completed       Review of Systems   Constitutional: Negative for fatigue.   Respiratory: Positive for chest tightness (only at night when laying down ).  "Negative for cough, shortness of breath and wheezing.    Genitourinary: Negative for difficulty urinating and dysuria.             /72 (BP Location: Left arm, Patient Position: Sitting, Cuff Size: Adult)   Pulse 84   Temp 98.6 °F (37 °C) (Oral)   Resp 14   Ht 175.3 cm (69\")   Wt 66.7 kg (147 lb)   SpO2 99%   BMI 21.71 kg/m²       Physical Exam   Constitutional: She is oriented to person, place, and time. She appears well-developed and well-nourished. No distress.   HENT:   Head: Normocephalic and atraumatic.   Right Ear: External ear normal.   Left Ear: External ear normal.   Mouth/Throat: Oropharynx is clear and moist. No oropharyngeal exudate.   Eyes: Conjunctivae are normal. Right eye exhibits no discharge. Left eye exhibits no discharge. No scleral icterus.   Neck: Neck supple.   Cardiovascular: Normal rate, regular rhythm and normal heart sounds. Exam reveals no gallop and no friction rub.   No murmur heard.  Pulmonary/Chest: Effort normal and breath sounds normal. No respiratory distress. She has no wheezes. She has no rales.   Abdominal: Soft. Bowel sounds are normal. She exhibits no distension and no mass. There is no tenderness. There is no guarding.   Lymphadenopathy:     She has no cervical adenopathy.   Neurological: She is alert and oriented to person, place, and time.   Skin: Skin is warm. No rash noted.   Psychiatric: She has a normal mood and affect. Her behavior is normal.   Nursing note and vitals reviewed.        Results for orders placed or performed during the hospital encounter of 01/27/19   Urine Culture - Urine, Urine, Clean Catch   Result Value Ref Range    Urine Culture No growth at 2 days    POC Urinalysis Dipstick, Multipro (Automated dipstick)   Result Value Ref Range    Color Yellow Yellow, Straw, Dark Yellow, Priscila    Clarity, UA Cloudy (A) Clear    Glucose, UA Negative Negative, 1000 mg/dL (3+) mg/dL    Bilirubin 1 mg/dL (A) Negative    Ketones, UA 15 mg/dL (A) Negative "    Specific Gravity  1.010 1.005 - 1.030    Blood, UA Negative Negative    pH, Urine 7.0 5.0 - 8.0    Protein, POC Trace (A) Negative mg/dL    Urobilinogen, UA 1 E.U./dL  (A) Normal    Nitrite, UA Negative Negative    Leukocytes 500 Chava/ul (A) Negative           Humberto was seen today for follow-up and asthma.    Diagnoses and all orders for this visit:    Moderate asthma with status asthmaticus, unspecified whether persistent    Nausea      She is doing well on Symbicort and albuterol  PFT's were reviewed and normal  Will call me if she has issues before I see her again   Keep f/u with allergist    Avoid lactose and will go up PPI if this is truly lactose intolerance     Return in about 1 year (around 5/16/2020) for Recheck.    Corina Cuenca MD  05/16/2019

## 2019-09-16 ENCOUNTER — OFFICE VISIT (OUTPATIENT)
Dept: OBSTETRICS AND GYNECOLOGY | Facility: CLINIC | Age: 24
End: 2019-09-16

## 2019-09-16 VITALS
BODY MASS INDEX: 22.63 KG/M2 | HEIGHT: 69 IN | WEIGHT: 152.8 LBS | DIASTOLIC BLOOD PRESSURE: 68 MMHG | SYSTOLIC BLOOD PRESSURE: 118 MMHG

## 2019-09-16 DIAGNOSIS — Z11.51 SPECIAL SCREENING EXAMINATION FOR HUMAN PAPILLOMAVIRUS (HPV): ICD-10-CM

## 2019-09-16 DIAGNOSIS — Z01.419 WELL WOMAN EXAM: Primary | ICD-10-CM

## 2019-09-16 DIAGNOSIS — Z01.419 PAP SMEAR, LOW-RISK: ICD-10-CM

## 2019-09-16 DIAGNOSIS — N94.6 DYSMENORRHEA: ICD-10-CM

## 2019-09-16 LAB

## 2019-09-16 PROCEDURE — 81025 URINE PREGNANCY TEST: CPT | Performed by: OBSTETRICS & GYNECOLOGY

## 2019-09-16 PROCEDURE — 81002 URINALYSIS NONAUTO W/O SCOPE: CPT | Performed by: OBSTETRICS & GYNECOLOGY

## 2019-09-16 PROCEDURE — 99395 PREV VISIT EST AGE 18-39: CPT | Performed by: OBSTETRICS & GYNECOLOGY

## 2019-09-16 RX ORDER — VENLAFAXINE HYDROCHLORIDE 75 MG/1
CAPSULE, EXTENDED RELEASE ORAL
COMMUNITY
End: 2020-01-17

## 2019-09-16 RX ORDER — DROSPIRENONE AND ETHINYL ESTRADIOL 0.02-3(28)
1 KIT ORAL DAILY
Qty: 84 TABLET | Refills: 4 | Status: SHIPPED | OUTPATIENT
Start: 2019-09-16 | End: 2020-02-20 | Stop reason: SDUPTHER

## 2019-09-16 NOTE — PROGRESS NOTES
GYN Annual Exam     CC- Here for annual exam.     Humberto Gold is a 24 y.o. female who presents for annual well woman exam. Periods are absent on continuous OCPs. Pt gives herself a cycle every 3-4 months, lasting 4 days. Dysmenorrhea:severe, occurring throughout menses. Cyclic symptoms include none. No intermenstrual bleeding, spotting, or discharge.  Patient is sexually active  yes - same boyfriend for 2 years . Patient is satisfied with her contraception yes - OCPs.     OB History      Para Term  AB Living    0 0 0 0 0 0    SAB TAB Ectopic Molar Multiple Live Births    0 0 0   0            Current contraception: OCP (estrogen/progesterone)  History of abnormal Pap smear: yes: LGSIL pap smear .  History of abnormal mammogram: no  Family history of uterine, colon or ovarian cancer: no  Family history of breast cancer: no    Health Maintenance   Topic Date Due   • HPV VACCINES (1 - Female 3-dose series) 2010   • TDAP/TD VACCINES (2 - Td) 2017   • ANNUAL PHYSICAL  2018   • CHLAMYDIA SCREENING  2019   • Annual Gynecologic Pelvic and Breast Exam  2019   • INFLUENZA VACCINE  2019   • PAP SMEAR  2021       Past Medical History:   Diagnosis Date   • Acne    • Asthma    • Heart murmur        History reviewed. No pertinent surgical history.      Current Outpatient Medications:   •  Amphetamine-Dextroamphetamine (AMPHETAMINE SALT COMBO PO), 10 mg., Disp: , Rfl:   •  Cetirizine HCl (ZYRTEC PO), Take  by mouth As Needed., Disp: , Rfl:   •  drospirenone-ethinyl estradiol (ORLANDO) 3-0.02 MG per tablet, Take 1 tablet by mouth Daily., Disp: 84 tablet, Rfl: 4  •  SYMBICORT 80-4.5 MCG/ACT inhaler, INHALE TWO PUFFS BY MOUTH TWICE A DAY, Disp: 10.2 g, Rfl: 5  •  venlafaxine XR (EFFEXOR XR) 75 MG 24 hr capsule, , Disp: , Rfl:     Allergies   Allergen Reactions   • Peanuts [Peanut Oil] Anaphylaxis   • Penicillins Anaphylaxis       Social History     Tobacco Use   • Smoking  "status: Never Smoker   • Smokeless tobacco: Never Used   Substance Use Topics   • Alcohol use: Yes     Comment: social   • Drug use: No       Family History   Problem Relation Age of Onset   • Heart disease Paternal Grandfather    • Diabetes type II Paternal Grandfather        Review of Systems   Gastrointestinal: Negative for abdominal distention and abdominal pain.   Genitourinary: Positive for menstrual problem and vaginal bleeding. Negative for dyspareunia, pelvic pain, vaginal discharge and vaginal pain.       /68   Ht 175.3 cm (69.02\")   Wt 69.3 kg (152 lb 12.8 oz)   Breastfeeding? No   BMI 22.55 kg/m²     Physical Exam   Constitutional: She is oriented to person, place, and time. She appears well-developed and well-nourished.   HENT:   Mouth/Throat: Normal dentition. No dental caries.   Cardiovascular: Normal rate and regular rhythm.   Pulmonary/Chest: Effort normal and breath sounds normal. Right breast exhibits no inverted nipple, no mass, no nipple discharge, no skin change and no tenderness. Left breast exhibits no inverted nipple, no mass, no nipple discharge, no skin change and no tenderness.   2-3cm right breast mass at 12 oclock position       Abdominal: Soft. She exhibits no distension and no mass. There is no tenderness.   Genitourinary: There is no rash, tenderness or lesion on the right labia. There is no rash, tenderness or lesion on the left labia. Uterus is not deviated, not enlarged, not fixed and not tender. Cervix exhibits no motion tenderness, no discharge and no friability. Right adnexum displays no mass, no tenderness and no fullness. Left adnexum displays no mass, no tenderness and no fullness. No tenderness or bleeding in the vagina. No vaginal discharge found.   Neurological: She is alert and oriented to person, place, and time.   Psychiatric: She has a normal mood and affect. Her behavior is normal. Judgment and thought content normal.   Vitals reviewed.       "   Assessment/Plan    1) GYN HM: Check pap smear.  LPS 2018 LGSIL. SBE demonstrated and encouraged.  2) STD screening: GCCT. Declines other STD testing  3) Contraception: OCPs- refills given.  4) Severe dysmenorrhea: Improved on continuous OCPs  5) Diet and Exercise discussed  6) Smoking Status: nonsmoker  7) Social: Lives in [place].  Has taken a break from college and plans to return in the spring or fall of next year.  8) Right breast mass: Dx last year on PE. 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 PE reveals stable size of mass.  9) Follow up prn and 1 yearar       Diagnoses and all orders for this visit:    Well woman exam  -     POC Pregnancy, Urine  -     POC Urinalysis Dipstick    Pap smear, low-risk  -     Pap IG, Ct-Ng TV Rfx HPV ASCU    Special screening examination for human papillomavirus (HPV)  -     Pap IG, Ct-Ng TV Rfx HPV ASCU    Dysmenorrhea    Other orders  -     venlafaxine XR (EFFEXOR XR) 75 MG 24 hr capsule  -     drospirenone-ethinyl estradiol (ORLANDO) 3-0.02 MG per tablet; Take 1 tablet by mouth Daily.          Gema Sotelo,   9/16/2019  12:48 PM

## 2019-09-19 LAB
C TRACH RRNA CVX QL NAA+PROBE: NEGATIVE
CONV .: NORMAL
CYTOLOGIST CVX/VAG CYTO: NORMAL
CYTOLOGY CVX/VAG DOC CYTO: NORMAL
CYTOLOGY CVX/VAG DOC THIN PREP: NORMAL
DX ICD CODE: NORMAL
HIV 1 & 2 AB SER-IMP: NORMAL
Lab: NORMAL
N GONORRHOEA RRNA CVX QL NAA+PROBE: NEGATIVE
OTHER STN SPEC: NORMAL
STAT OF ADQ CVX/VAG CYTO-IMP: NORMAL
T VAGINALIS RRNA SPEC QL NAA+PROBE: NEGATIVE

## 2020-02-20 RX ORDER — DROSPIRENONE AND ETHINYL ESTRADIOL 0.02-3(28)
1 KIT ORAL DAILY
Qty: 24 TABLET | Refills: 1 | Status: SHIPPED | OUTPATIENT
Start: 2020-02-20 | End: 2020-04-15 | Stop reason: SDUPTHER

## 2020-04-20 RX ORDER — DROSPIRENONE AND ETHINYL ESTRADIOL 0.02-3(28)
1 KIT ORAL DAILY
Qty: 84 TABLET | Refills: 1 | Status: SHIPPED | OUTPATIENT
Start: 2020-04-20 | End: 2022-10-15

## 2020-11-20 ENCOUNTER — E-VISIT (OUTPATIENT)
Dept: FAMILY MEDICINE CLINIC | Facility: TELEHEALTH | Age: 25
End: 2020-11-20

## 2020-11-20 DIAGNOSIS — J45.901 EXACERBATION OF ASTHMA, UNSPECIFIED ASTHMA SEVERITY, UNSPECIFIED WHETHER PERSISTENT: Primary | ICD-10-CM

## 2020-11-20 PROCEDURE — 99422 OL DIG E/M SVC 11-20 MIN: CPT | Performed by: NURSE PRACTITIONER

## 2020-11-20 RX ORDER — ALBUTEROL SULFATE 90 UG/1
2 AEROSOL, METERED RESPIRATORY (INHALATION) EVERY 6 HOURS PRN
Qty: 8 G | Refills: 0 | Status: SHIPPED | OUTPATIENT
Start: 2020-11-20 | End: 2023-02-06 | Stop reason: SDUPTHER

## 2020-11-20 RX ORDER — PREDNISONE 20 MG/1
40 TABLET ORAL DAILY
Qty: 10 TABLET | Refills: 0 | Status: SHIPPED | OUTPATIENT
Start: 2020-11-20 | End: 2020-11-25

## 2020-11-20 RX ORDER — ALBUTEROL SULFATE 2.5 MG/3ML
2.5 SOLUTION RESPIRATORY (INHALATION) EVERY 4 HOURS PRN
Qty: 90 ML | Refills: 0 | Status: SHIPPED | OUTPATIENT
Start: 2020-11-20 | End: 2021-02-16 | Stop reason: SDUPTHER

## 2020-11-20 RX ORDER — FLUTICASONE PROPIONATE 50 MCG
2 SPRAY, SUSPENSION (ML) NASAL DAILY
Qty: 1 BOTTLE | Refills: 0 | Status: SHIPPED | OUTPATIENT
Start: 2020-11-20

## 2020-11-20 NOTE — PATIENT INSTRUCTIONS
Take medicine as prescribed.   Do not use albuterol inhaler and nebulizer solution at the same time or within 4 hours of each other.   Continue allergy medication of choice.   If symptoms worsen or do not improve follow up with your PCP or visit your nearest Urgent Care Center or ER.      Asthma, Adult    Asthma is a long-term (chronic) condition that causes recurrent episodes in which the airways become tight and narrow. The airways are the passages that lead from the nose and mouth down into the lungs. Asthma episodes, also called asthma attacks, can cause coughing, wheezing, shortness of breath, and chest pain. The airways can also fill with mucus. During an attack, it can be difficult to breathe. Asthma attacks can range from minor to life threatening.  Asthma cannot be cured, but medicines and lifestyle changes can help control it and treat acute attacks.  What are the causes?  This condition is believed to be caused by inherited (genetic) and environmental factors, but its exact cause is not known.  There are many things that can bring on an asthma attack or make asthma symptoms worse (triggers). Asthma triggers are different for each person. Common triggers include:  · Mold.  · Dust.  · Cigarette smoke.  · Cockroaches.  · Things that can cause allergy symptoms (allergens), such as animal dander or pollen from trees or grass.  · Air pollutants such as household , wood smoke, smog, or chemical odors.  · Cold air, weather changes, and winds (which increase molds and pollen in the air).  · Strong emotional expressions such as crying or laughing hard.  · Stress.  · Certain medicines (such as aspirin) or types of medicines (such as beta-blockers).  · Sulfites in foods and drinks. Foods and drinks that may contain sulfites include dried fruit, potato chips, and sparkling grape juice.  · Infections or inflammatory conditions such as the flu, a cold, or inflammation of the nasal membranes  (rhinitis).  · Gastroesophageal reflux disease (GERD).  · Exercise or strenuous activity.  What are the signs or symptoms?  Symptoms of this condition may occur right after asthma is triggered or many hours later. Symptoms include:  · Wheezing. This can sound like whistling when you breathe.  · Excessive nighttime or early morning coughing.  · Frequent or severe coughing with a common cold.  · Chest tightness.  · Shortness of breath.  · Tiredness (fatigue) with minimal activity.  How is this diagnosed?  This condition is diagnosed based on:  · Your medical history.  · A physical exam.  · Tests, which may include:  ? Lung function studies and pulmonary studies (spirometry). These tests can evaluate the flow of air in your lungs.  ? Allergy tests.  ? Imaging tests, such as X-rays.  How is this treated?  There is no cure for this condition, but treatment can help control your symptoms. Treatment for asthma usually involves:  · Identifying and avoiding your asthma triggers.  · Using medicines to control your symptoms. Generally, two types of medicines are used to treat asthma:  ? Controller medicines. These help prevent asthma symptoms from occurring. They are usually taken every day.  ? Fast-acting reliever or rescue medicines. These quickly relieve asthma symptoms by widening the narrow and tight airways. They are used as needed and provide short-term relief.  · Using supplemental oxygen. This may be needed during a severe episode.  · Using other medicines, such as:  ? Allergy medicines, such as antihistamines, if your asthma attacks are triggered by allergens.  ? Immune medicines (immunomodulators). These are medicines that help control the immune system.  · Creating an asthma action plan. An asthma action plan is a written plan for managing and treating your asthma attacks. This plan includes:  ? A list of your asthma triggers and how to avoid them.  ? Information about when medicines should be taken and when their  dosage should be changed.  ? Instructions about using a device called a peak flow meter. A peak flow meter measures how well the lungs are working and the severity of your asthma. It helps you monitor your condition.  Follow these instructions at home:  Controlling your home environment  Control your home environment in the following ways to help avoid triggers and prevent asthma attacks:  · Change your heating and air conditioning filter regularly.  · Limit your use of fireplaces and wood stoves.  · Get rid of pests (such as roaches and mice) and their droppings.  · Throw away plants if you see mold on them.  · Clean floors and dust surfaces regularly. Use unscented cleaning products.  · Try to have someone else vacuum for you regularly. Stay out of rooms while they are being vacuumed and for a short while afterward. If you vacuum, use a dust mask from a hardware store, a double-layered or microfilter vacuum  bag, or a vacuum  with a HEPA filter.  · Replace carpet with wood, tile, or vinyl lula. Carpet can trap dander and dust.  · Use allergy-proof pillows, mattress covers, and box spring covers.  · Keep your bedroom a trigger-free room.  · Avoid pets and keep windows closed when allergens are in the air.  · Wash beddings every week in hot water and dry them in a dryer.  · Use blankets that are made of polyester or cotton.  · Clean bathrooms and marco with bleach. If possible, have someone repaint the walls in these rooms with mold-resistant paint. Stay out of the rooms that are being cleaned and painted.  · Wash your hands often with soap and water. If soap and water are not available, use hand .  · Do not allow anyone to smoke in your home.  General instructions  · Take over-the-counter and prescription medicines only as told by your health care provider.  ? Speak with your health care provider if you have questions about how or when to take the medicines.  ? Make note if you are  requiring more frequent dosages.  · Do not use any products that contain nicotine or tobacco, such as cigarettes and e-cigarettes. If you need help quitting, ask your health care provider. Also, avoid being exposed to secondhand smoke.  · Use a peak flow meter as told by your health care provider. Record and keep track of the readings.  · Understand and use the asthma action plan to help minimize, or stop an asthma attack, without needing to seek medical care.  · Make sure you stay up to date on your yearly vaccinations as told by your health care provider. This may include vaccines for the flu and pneumonia.  · Avoid outdoor activities when allergen counts are high and when air quality is low.  · Wear a ski mask that covers your nose and mouth during outdoor winter activities. Exercise indoors on cold days if you can.  · Warm up before exercising, and take time for a cool-down period after exercise.  · Keep all follow-up visits as told by your health care provider. This is important.  Where to find more information  · For information about asthma, turn to the Centers for Disease Control and Prevention at www.cdc.gov/asthma/faqs.htm  · For air quality information, turn to AirNow at https://airnow.gov/  Contact a health care provider if:  · You have wheezing, shortness of breath, or a cough even while you are taking medicine to prevent attacks.  · The mucus you cough up (sputum) is thicker than usual.  · Your sputum changes from clear or white to yellow, green, gray, or bloody.  · Your medicines are causing side effects, such as a rash, itching, swelling, or trouble breathing.  · You need to use a reliever medicine more than 2-3 times a week.  · Your peak flow reading is still at 50-79% of your personal best after following your action plan for 1 hour.  · You have a fever.  Get help right away if:  · You are getting worse and do not respond to treatment during an asthma attack.  · You are short of breath when at rest  or when doing very little physical activity.  · You have difficulty eating, drinking, or talking.  · You have chest pain or tightness.  · You develop a fast heartbeat or palpitations.  · You have a bluish color to your lips or fingernails.  · You are light-headed or dizzy, or you faint.  · Your peak flow reading is less than 50% of your personal best.  · You feel too tired to breathe normally.  Summary  · Asthma is a long-term (chronic) condition that causes recurrent episodes in which the airways become tight and narrow. These episodes can cause coughing, wheezing, shortness of breath, and chest pain.  · Asthma cannot be cured, but medicines and lifestyle changes can help control it and treat acute attacks.  · Make sure you understand how to avoid triggers and how and when to use your medicines.  · Asthma attacks can range from minor to life threatening. Get help right away if you have an asthma attack and do not respond to treatment with your usual rescue medicines.  This information is not intended to replace advice given to you by your health care provider. Make sure you discuss any questions you have with your health care provider.  Document Released: 12/18/2006 Document Revised: 02/20/2020 Document Reviewed: 01/22/2018  Elsevier Patient Education © 2020 [employer]

## 2020-11-20 NOTE — PROGRESS NOTES
Humberto Gold    1995  [id number]    I have reviewed the e-Visit questionnaire and patient's answers, my assessment and plan are as follows:    HPI- Pt reports cough, SOA, chest tightness, rhinorrhea, nasal congestion x 4 days. Cough is mostly at night and if productive with clear mucus. Denies fever and is not a smoker. PMH of asthma. She has had to use her rescue inhaler more often lately, at least once/day. Pt has had similar symptoms with asthma exacerbation in the past around this time of year and states she is usually treated with steroids and nebulizer treatments. She reports she is low on Albuterol. She declines COVID-19 testing and states she has been at home all month and has not been exposed.     Review of Systems - General ROS: negative for - fever  ENT ROS: positive for - nasal congestion and nasal discharge  Respiratory ROS: positive for - cough and shortness of breath      Diagnoses and all orders for this visit:    1. Exacerbation of asthma, unspecified asthma severity, unspecified whether persistent (Primary)  -     albuterol sulfate  (90 Base) MCG/ACT inhaler; Inhale 2 puffs Every 6 (Six) Hours As Needed for Wheezing or Shortness of Air.  Dispense: 8 g; Refill: 0  -     fluticasone (Flonase) 50 MCG/ACT nasal spray; 2 sprays into the nostril(s) as directed by provider Daily.  Dispense: 1 bottle; Refill: 0  -     predniSONE (DELTASONE) 20 MG tablet; Take 2 tablets by mouth Daily for 5 days.  Dispense: 10 tablet; Refill: 0  -     albuterol (PROVENTIL) (2.5 MG/3ML) 0.083% nebulizer solution; Take 2.5 mg by nebulization Every 4 (Four) Hours As Needed for Wheezing or Shortness of Air.  Dispense: 90 mL; Refill: 0    Take medicine as prescribed.   Do not use albuterol inhaler and nebulizer solution at the same time or within 4 hours of each other.   Continue allergy medication of choice.   If symptoms worsen or do not improve follow up with your PCP or visit your nearest Urgent Care Center or  ER.        Any medications prescribed have been sent electronically to   [facility] - [place], [place] - [address] - [phone]  - [phone]   [address]  [facility] [address]  Phone: [phone] Fax: [phone]    I spent 15 min reviewing this chart.     Ev Tapia, APRN  11/20/20  07:37 EST

## 2021-02-16 ENCOUNTER — E-VISIT (OUTPATIENT)
Dept: FAMILY MEDICINE CLINIC | Facility: TELEHEALTH | Age: 26
End: 2021-02-16

## 2021-02-16 DIAGNOSIS — J06.9 ACUTE URI: Primary | ICD-10-CM

## 2021-02-16 PROCEDURE — 99422 OL DIG E/M SVC 11-20 MIN: CPT | Performed by: NURSE PRACTITIONER

## 2021-02-16 RX ORDER — FLUOXETINE HYDROCHLORIDE 40 MG/1
CAPSULE ORAL
COMMUNITY
End: 2022-10-15

## 2021-02-16 RX ORDER — ALBUTEROL SULFATE 2.5 MG/3ML
2.5 SOLUTION RESPIRATORY (INHALATION) EVERY 4 HOURS PRN
Qty: 90 ML | Refills: 0 | Status: SHIPPED | OUTPATIENT
Start: 2021-02-16

## 2021-02-16 RX ORDER — PREDNISONE 10 MG/1
TABLET ORAL
Qty: 21 TABLET | Refills: 0 | Status: SHIPPED | OUTPATIENT
Start: 2021-02-16 | End: 2022-10-15

## 2021-02-16 RX ORDER — DEXTROAMPHETAMINE SACCHARATE, AMPHETAMINE ASPARTATE, DEXTROAMPHETAMINE SULFATE AND AMPHETAMINE SULFATE 5; 5; 5; 5 MG/1; MG/1; MG/1; MG/1
TABLET ORAL
COMMUNITY
End: 2023-02-06

## 2021-02-16 RX ORDER — AZITHROMYCIN 250 MG/1
TABLET, FILM COATED ORAL
Qty: 6 TABLET | Refills: 0 | Status: SHIPPED | OUTPATIENT
Start: 2021-02-16 | End: 2022-10-15

## 2021-02-16 NOTE — PROGRESS NOTES
I have reviewed the e-Visit questionnaire and patient's answers, and my assessment and plan are as follows:    HPI  Humberto Gold is a 25 y.o. female  presents with complaint of congestion, sinus pain, headache, cough, sneezing for the last week.. Reports difficulty breathing due to increased nasal drainage. Using albuterol inh daily, but does not have nebulizer medication. Occasional wheeze. Unknown if fever but reports feeling warm. Non-smoker. History of asthma. Has been also using zyrtec and flonase. She states it feels like a sinus infection, and will get covid tested.     Review of Systems - Negative except those listed in the HPI.      Diagnoses and all orders for this visit:    1. Acute URI (Primary)  -     COVID-19 PCR, [facility], NP SWAB IN [place] VIRAL TRANSPORT MEDIA 24-30 HR TAT - Swab, Nasopharynx; Future  -     albuterol (PROVENTIL) (2.5 MG/3ML) 0.083% nebulizer solution; Take 2.5 mg by nebulization Every 4 (Four) Hours As Needed for Wheezing or Shortness of Air.  Dispense: 90 mL; Refill: 0  -     predniSONE (DELTASONE) 10 MG tablet; Prednisone 10mg tablet taper pack for 6 days as directed  Dispense: 21 tablet; Refill: 0  -     azithromycin (Zithromax Z-Petar) 250 MG tablet; Take 2 tablets the first day, then 1 tablet daily for 4 days.  Dispense: 6 tablet; Refill: 0  -     QUESTIONNAIRE SERIES          FOLLOW-UP  If symptoms worsen or fail to improve, follow-up with PCP/Urgent Care/Emergency Department.      Time Documentation  Counseled patient  Counseling topics: diagnosis, treatment options and follow up plan  Total encounter time: counseling time more than 50% of visit: 15 minutes        Caren Bull, GOGO  02/16/21  11:42 EST

## 2021-02-16 NOTE — PATIENT INSTRUCTIONS
How to Quarantine at Home  Information for Patients and Families    These instructions are for people with confirmed or suspected COVID-19 who do not need to be hospitalized and those with confirmed COVID-19 who were hospitalized and discharged to care for themselves at home.    If you were tested through the Health Department  The Health Department will monitor your wellbeing.  If it is determined that you do not need to be hospitalized and can be isolated at home, you will be monitored by staff from your local or state health department.     If you were tested through a Commercial Lab  You will need to monitor yourself and report changes in your symptoms to your doctor.  See the section below called Monitor Your Symptoms.    Follow these steps until a healthcare provider or local or state health department says you can return to your normal activities.    Stay home except to get medical care  • Restrict activities outside your home, except for getting medical care.   • Do not go to work, school, or public areas.   • Avoid using public transportation, ride-sharing, or taxis.    Separate yourself from other people and animals in your home  People  As much as possible, you should stay in a specific room and away from other people in your home. Also, you should use a separate bathroom, if available.    Animals  You should restrict contact with pets and other animals while you are sick with COVID-19, just like you would around other people. When possible, have another member of your household care for your animals while you are sick. If you are sick with COVID-19, avoid contact with your pet, including petting, snuggling, being kissed or licked, and sharing food. If you must care for your pet or be around animals while you are sick, wash your hands before and after you interact with pets and wear a facemask. See COVID-19 and Animals for more information.    Call ahead before visiting your doctor  If you have a medical  appointment, call the healthcare provider and tell them that you have or may have COVID-19. This information will help the healthcare provider’s office take steps to keep other people from getting infected or exposed.    Wear a facemask  You should wear a facemask when you are around other people (e.g., sharing a room or vehicle) or pets and before you enter a healthcare provider’s office.     If you are not able to wear a facemask (for example, because it causes trouble breathing), then people who live with you should not stay in the same room with you, or they should wear a facemask if they enter your room.    Cover your coughs and sneezes  • Cover your mouth and nose with a tissue when you cough or sneeze.   • Throw used tissues in a lined trash can.   • Immediately wash your hands with soap and water for at least 20 seconds or, if soap and water are not available, clean your hands with an alcohol-based hand  that contains at least 60% alcohol.    Clean your hands often  • Wash your hands often with soap and water for at least 20 seconds, especially after blowing your nose, coughing, or sneezing; going to the bathroom; and before eating or preparing food.     • If soap and water are not readily available, use an alcohol-based hand  with at least 60% alcohol, covering all surfaces of your hands and rubbing them together until they feel dry.    • Soap and water are the best option if hands are visibly dirty. Avoid touching your eyes, nose, and mouth with unwashed hands.    Avoid sharing personal household items  • You should not share dishes, drinking glasses, cups, eating utensils, towels, or bedding with other people or pets in your home.   • After using these items, they should be washed thoroughly with soap and water.    Clean all “high-touch” surfaces everyday  • High touch surfaces include counters, tabletops, doorknobs, bathroom fixtures, toilets, phones, keyboards, tablets, and bedside  tables.   • Also, clean any surfaces that may have blood, stool, or body fluids on them.   • Use a household cleaning spray or wipe, according to the label instructions. Labels contain instructions for safe and effective use of the cleaning product, including precautions you should take when applying the product, such as wearing gloves and making sure you have good ventilation during use of the product.    Monitor your symptoms  • Seek prompt medical attention if your illness is worsening (e.g., difficulty breathing).   • Before seeking care, call your healthcare provider and tell them that you have, or are being evaluated for, COVID-19.   • Put on a facemask before you enter the facility.     • These steps will help the healthcare provider’s office to keep other people in the office or waiting room from getting infected or exposed.   • Persons who are placed under active monitoring or facilitated self-monitoring should follow instructions provided by their local health department or occupational health professionals, as appropriate.  • If you have a medical emergency and need to call 911, notify the dispatch personnel that you have, or are being evaluated for COVID-19. If possible, put on a facemask before emergency medical services arrive.    Discontinuing home isolation  Patients with confirmed COVID-19 should remain under home isolation precautions until the risk of secondary transmission to others is thought to be low. The decision to discontinue home isolation precautions should be made on a case-by-case basis, in consultation with healthcare providers and state and local health departments.    The below content are for household members, intimate partners, and caregivers of a patient with symptomatic laboratory-confirmed COVID-19 or a patient under investigation:    Household members, intimate partners, and caregivers may have close contact with a person with symptomatic, laboratory-confirmed COVID-19 or a  person under investigation.     Close contacts should monitor their health; they should call their healthcare provider right away if they develop symptoms suggestive of COVID-19 (e.g., fever, cough, shortness of breath)     Close contacts should also follow these recommendations:  • Make sure that you understand and can help the patient follow their healthcare provider’s instructions for medication(s) and care. You should help the patient with basic needs in the home and provide support for getting groceries, prescriptions, and other personal needs.  • Monitor the patient’s symptoms. If the patient is getting sicker, call his or her healthcare provider and tell them that the patient has laboratory-confirmed COVID-19. This will help the healthcare provider’s office take steps to keep other people in the office or waiting room from getting infected. Ask the healthcare provider to call the local or [facility] health department for additional guidance. If the patient has a medical emergency and you need to call 911, notify the dispatch personnel that the patient has, or is being evaluated for COVID-19.  • Household members should stay in another room or be  from the patient as much as possible. Household members should use a separate bedroom and bathroom, if available.  • Prohibit visitors who do not have an essential need to be in the home.  • Household members should care for any pets in the home. Do not handle pets or other animals while sick.  For more information, see COVID-19 and Animals.  • Make sure that shared spaces in the home have good air flow, such as by an air conditioner or an opened window, weather permitting.  • Perform hand hygiene frequently. Wash your hands often with soap and water for at least 20 seconds or use an alcohol-based hand  that contains 60 to 95% alcohol, covering all surfaces of your hands and rubbing them together until they feel dry. Soap and water should be used  preferentially if hands are visibly dirty.  • Avoid touching your eyes, nose, and mouth with unwashed hands.  • The patient should wear a facemask when you are around other people. If the patient is not able to wear a facemask (for example, because it causes trouble breathing), you, as the caregiver, should wear a mask when you are in the same room as the patient.  • Wear a disposable facemask and gloves when you touch or have contact with the patient’s blood, stool, or body fluids, such as saliva, sputum, nasal mucus, vomit, or urine.   o Throw out disposable facemasks and gloves after using them. Do not reuse.  o When removing personal protective equipment, first remove and dispose of gloves. Then, immediately clean your hands with soap and water or alcohol-based hand . Next, remove and dispose of facemask, and immediately clean your hands again with soap and water or alcohol-based hand .  • Avoid sharing household items with the patient. You should not share dishes, drinking glasses, cups, eating utensils, towels, bedding, or other items. After the patient uses these items, you should wash them thoroughly (see below “Wash laundry thoroughly”).  • Clean all “high-touch” surfaces, such as counters, tabletops, doorknobs, bathroom fixtures, toilets, phones, keyboards, tablets, and bedside tables, every day. Also, clean any surfaces that may have blood, stool, or body fluids on them.   o Use a household cleaning spray or wipe, according to the label instructions. Labels contain instructions for safe and effective use of the cleaning product including precautions you should take when applying the product, such as wearing gloves and making sure you have good ventilation during use of the product.  • Wash laundry thoroughly.   o Immediately remove and wash clothes or bedding that have blood, stool, or body fluids on them.  o Wear disposable gloves while handling soiled items and keep soiled items away  from your body. Clean your hands (with soap and water or an alcohol-based hand ) immediately after removing your gloves.  o Read and follow directions on labels of laundry or clothing items and detergent. In general, using a normal laundry detergent according to washing machine instructions and dry thoroughly using the warmest temperatures recommended on the clothing label.  • Place all used disposable gloves, facemasks, and other contaminated items in a lined container before disposing of them with other household waste. Clean your hands (with soap and water or an alcohol-based hand ) immediately after handling these items. Soap and water should be used preferentially if hands are visibly dirty.  • Discuss any additional questions with your state or local health department or healthcare provider.    Adapted from information provided by the Centers for Disease Control and Prevention.  For more information, visit https://www.cdc.gov/coronavirus/2019-ncov/hcp/guidance-prevent-spread.htmlUpper Respiratory Infection, Adult  An upper respiratory infection (URI) is a common viral infection of the nose, throat, and upper air passages that lead to the lungs. The most common type of URI is the common cold. URIs usually get better on their own, without medical treatment.  What are the causes?  A URI is caused by a virus. You may catch a virus by:  · Breathing in droplets from an infected person's cough or sneeze.  · Touching something that has been exposed to the virus (contaminated) and then touching your mouth, nose, or eyes.  What increases the risk?  You are more likely to get a URI if:  · You are very young or very old.  · It is anatoly or winter.  · You have close contact with others, such as at a , school, or health care facility.  · You smoke.  · You have long-term (chronic) heart or lung disease.  · You have a weakened disease-fighting (immune) system.  · You have nasal allergies or  asthma.  · You are experiencing a lot of stress.  · You work in an area that has poor air circulation.  · You have poor nutrition.  What are the signs or symptoms?  A URI usually involves some of the following symptoms:  · Runny or stuffy (congested) nose.  · Sneezing.  · Cough.  · Sore throat.  · Headache.  · Fatigue.  · Fever.  · Loss of appetite.  · Pain in your forehead, behind your eyes, and over your cheekbones (sinus pain).  · Muscle aches.  · Redness or irritation of the eyes.  · Pressure in the ears or face.  How is this diagnosed?  This condition may be diagnosed based on your medical history and symptoms, and a physical exam. Your health care provider may use a cotton swab to take a mucus sample from your nose (nasal swab). This sample can be tested to determine what virus is causing the illness.  How is this treated?  URIs usually get better on their own within 7-10 days. You can take steps at home to relieve your symptoms. Medicines cannot cure URIs, but your health care provider may recommend certain medicines to help relieve symptoms, such as:  · Over-the-counter cold medicines.  · Cough suppressants. Coughing is a type of defense against infection that helps to clear the respiratory system, so take these medicines only as recommended by your health care provider.  · Fever-reducing medicines.  Follow these instructions at home:  Activity  · Rest as needed.  · If you have a fever, stay home from work or school until your fever is gone or until your health care provider says you are no longer contagious. Your health care provider may have you wear a face mask to prevent your infection from spreading.  Relieving symptoms  · Gargle with a salt-water mixture 3-4 times a day or as needed. To make a salt-water mixture, completely dissolve ½-1 tsp of salt in 1 cup of warm water.  · Use a cool-mist humidifier to add moisture to the air. This can help you breathe more easily.  Eating and drinking    · Drink  enough fluid to keep your urine pale yellow.  · Eat soups and other clear broths.  General instructions    · Take over-the-counter and prescription medicines only as told by your health care provider. These include cold medicines, fever reducers, and cough suppressants.  · Do not use any products that contain nicotine or tobacco, such as cigarettes and e-cigarettes. If you need help quitting, ask your health care provider.  · Stay away from secondhand smoke.  · Stay up to date on all immunizations, including the yearly (annual) flu vaccine.  · Keep all follow-up visits as told by your health care provider. This is important.  How to prevent the spread of infection to others    · URIs can be passed from person to person (are contagious). To prevent the infection from spreading:  ? Wash your hands often with soap and water. If soap and water are not available, use hand .  ? Avoid touching your mouth, face, eyes, or nose.  ? Cough or sneeze into a tissue or your sleeve or elbow instead of into your hand or into the air.  Contact a health care provider if:  · You are getting worse instead of better.  · You have a fever or chills.  · Your mucus is brown or red.  · You have yellow or brown discharge coming from your nose.  · You have pain in your face, especially when you bend forward.  · You have swollen neck glands.  · You have pain while swallowing.  · You have white areas in the back of your throat.  Get help right away if:  · You have shortness of breath that gets worse.  · You have severe or persistent:  ? Headache.  ? Ear pain.  ? Sinus pain.  ? Chest pain.  · You have chronic lung disease along with any of the following:  ? Wheezing.  ? Prolonged cough.  ? Coughing up blood.  ? A change in your usual mucus.  · You have a stiff neck.  · You have changes in your:  ? Vision.  ? Hearing.  ? Thinking.  ? Mood.  Summary  · An upper respiratory infection (URI) is a common infection of the nose, throat, and upper  air passages that lead to the lungs.  · A URI is caused by a virus.  · URIs usually get better on their own within 7-10 days.  · Medicines cannot cure URIs, but your health care provider may recommend certain medicines to help relieve symptoms.  This information is not intended to replace advice given to you by your health care provider. Make sure you discuss any questions you have with your health care provider.  Document Revised: 12/26/2019 Document Reviewed: 08/03/2018  Elsevier Patient Education © 2020 [employer]

## 2023-02-06 ENCOUNTER — LAB (OUTPATIENT)
Dept: LAB | Facility: HOSPITAL | Age: 28
End: 2023-02-06
Payer: COMMERCIAL

## 2023-02-06 ENCOUNTER — OFFICE VISIT (OUTPATIENT)
Dept: FAMILY MEDICINE CLINIC | Facility: CLINIC | Age: 28
End: 2023-02-06
Payer: COMMERCIAL

## 2023-02-06 VITALS
BODY MASS INDEX: 29.03 KG/M2 | OXYGEN SATURATION: 97 % | TEMPERATURE: 97.6 F | SYSTOLIC BLOOD PRESSURE: 140 MMHG | DIASTOLIC BLOOD PRESSURE: 78 MMHG | HEIGHT: 69 IN | RESPIRATION RATE: 16 BRPM | WEIGHT: 196 LBS | HEART RATE: 92 BPM

## 2023-02-06 DIAGNOSIS — F98.8 ATTENTION DEFICIT DISORDER, UNSPECIFIED HYPERACTIVITY PRESENCE: ICD-10-CM
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
## 2023-02-06 DIAGNOSIS — J45.901 EXACERBATION OF ASTHMA, UNSPECIFIED ASTHMA SEVERITY, UNSPECIFIED WHETHER PERSISTENT: ICD-10-CM

## 2023-02-06 DIAGNOSIS — Z12.4 CERVICAL CANCER SCREENING: ICD-10-CM

## 2023-02-06 DIAGNOSIS — Z00.00 ANNUAL PHYSICAL EXAM: ICD-10-CM

## 2023-02-06 DIAGNOSIS — Z00.00 ANNUAL PHYSICAL EXAM: Primary | ICD-10-CM

## 2023-02-06 PROCEDURE — 80050 GENERAL HEALTH PANEL: CPT

## 2023-02-06 PROCEDURE — 83036 HEMOGLOBIN GLYCOSYLATED A1C: CPT

## 2023-02-06 PROCEDURE — 99395 PREV VISIT EST AGE 18-39: CPT | Performed by: STUDENT IN AN ORGANIZED HEALTH CARE EDUCATION/TRAINING PROGRAM

## 2023-02-06 PROCEDURE — 91312 COVID-19 (PFIZER) BIVALENT BOOSTER 12+YRS: CPT | Performed by: STUDENT IN AN ORGANIZED HEALTH CARE EDUCATION/TRAINING PROGRAM

## 2023-02-06 PROCEDURE — 99213 OFFICE O/P EST LOW 20 MIN: CPT | Performed by: STUDENT IN AN ORGANIZED HEALTH CARE EDUCATION/TRAINING PROGRAM

## 2023-02-06 PROCEDURE — 0124A PR ADM SARSCOV2 30MCG/0.3ML BST: CPT | Performed by: STUDENT IN AN ORGANIZED HEALTH CARE EDUCATION/TRAINING PROGRAM

## 2023-02-06 PROCEDURE — 82306 VITAMIN D 25 HYDROXY: CPT

## 2023-02-06 PROCEDURE — 80061 LIPID PANEL: CPT

## 2023-02-06 PROCEDURE — 82607 VITAMIN B-12: CPT

## 2023-02-06 RX ORDER — LISDEXAMFETAMINE DIMESYLATE 10 MG/1
CAPSULE ORAL
COMMUNITY
Start: 2022-11-01

## 2023-02-06 RX ORDER — BUDESONIDE AND FORMOTEROL FUMARATE DIHYDRATE 160; 4.5 UG/1; UG/1
2 AEROSOL RESPIRATORY (INHALATION)
COMMUNITY
End: 2023-02-06 | Stop reason: SDUPTHER

## 2023-02-06 RX ORDER — HYDROXYZINE HYDROCHLORIDE 25 MG/1
TABLET, FILM COATED ORAL
COMMUNITY
Start: 2023-01-12

## 2023-02-06 RX ORDER — BUDESONIDE AND FORMOTEROL FUMARATE DIHYDRATE 160; 4.5 UG/1; UG/1
2 AEROSOL RESPIRATORY (INHALATION)
Qty: 1 EACH | Refills: 2 | Status: SHIPPED | OUTPATIENT
Start: 2023-02-06

## 2023-02-06 RX ORDER — ALBUTEROL SULFATE 90 UG/1
2 AEROSOL, METERED RESPIRATORY (INHALATION) EVERY 6 HOURS PRN
Qty: 8 G | Refills: 0 | Status: SHIPPED | OUTPATIENT
Start: 2023-02-06

## 2023-02-06 NOTE — PROGRESS NOTES
New Patient Office Visit      Patient Name: Humberto Gold  : 1995   MRN: [id number]     Chief Complaint:  Med Refill (Est care, fell a few weeks ago . Thinks you broke something )     History of Present Illness:       Fall  She says she came into work recently (one week ago) and she slipped on some black ice falling onto her left buttocks. She feels this has been aggravated given falls in the past as well. No bruising from this most recent fall. She has had some pain in her left buttocks. It is worse with prolonged sitting so she has a standing desk. 5/10 on pain scale and is improved. She took otc pain relief at first. She will also have pain in the coccyx.       She has a history of asthma. She takes symbicort and albuterol. She has been using this more as cold is a trigger for her. She has followed with [facility] asthma center.       Annual exam  Pap smear will refer to gyn.   Counseled on diet and exercise including limited sweets and sugars to focus on lean meat and vegetables. Counseled on 50 minutes of moderate exercise 3 times per week.   Recommend dental and eye exam  hiv hep c screening std screening: she declines   a1c /lipid screening: she is agreeable  covid vaccine: recommend booster.   Flu vaccine : she has had at work  Pneumonia vaccine recommended   Gardisil: she believes she has had.   Tdap: up to date         Subjective        Past Medical History:   Diagnosis Date   • Acne    • ADHD (attention deficit hyperactivity disorder) 2018   • Allergic    • Anxiety    • Asthma    • Depression    • Heart murmur    • Urinary tract infection        Past Surgical History:   Procedure Laterality Date   • BREAST SURGERY         Family History   Problem Relation Age of Onset   • Hyperlipidemia Father    • Heart disease Paternal Grandfather    • Diabetes type II Paternal Grandfather    • Arthritis Paternal Grandfather    • Cancer Paternal Grandfather         Bladder Cancer   • Diabetes Paternal  "Grandfather    • Hearing loss Paternal Grandfather    • Hyperlipidemia Paternal Grandfather    • Stroke Paternal Grandfather    • Arthritis Paternal Grandmother    • Asthma Paternal Aunt    • Depression Maternal Aunt        Social History     Socioeconomic History   • Marital status: Single   Tobacco Use   • Smoking status: Never   • Smokeless tobacco: Never   Vaping Use   • Vaping Use: Never used   Substance and Sexual Activity   • Alcohol use: Yes     Comment: Drink only socially, consisting of maybe 2 drinks   • Drug use: No   • Sexual activity: Yes     Partners: Male     Birth control/protection: Condom, Pill          Current Outpatient Medications:   •  albuterol (PROVENTIL) (2.5 MG/3ML) 0.083% nebulizer solution, Take 2.5 mg by nebulization Every 4 (Four) Hours As Needed for Wheezing or Shortness of Air., Disp: 90 mL, Rfl: 0  •  albuterol sulfate  (90 Base) MCG/ACT inhaler, Inhale 2 puffs Every 6 (Six) Hours As Needed for Wheezing or Shortness of Air., Disp: 8 g, Rfl: 0  •  budesonide-formoterol (SYMBICORT) 160-4.5 MCG/ACT inhaler, Inhale 2 puffs 2 (Two) Times a Day., Disp: 1 each, Rfl: 2  •  fluticasone (Flonase) 50 MCG/ACT nasal spray, 2 sprays into the nostril(s) as directed by provider Daily., Disp: 1 bottle, Rfl: 0  •  hydrOXYzine (ATARAX) 25 MG tablet, , Disp: , Rfl:   •  Levocetirizine Dihydrochloride (XYZAL ALLERGY 24HR PO), , Disp: , Rfl:   •  lisdexamfetamine dimesylate (Vyvanse) 10 MG capsule, , Disp: , Rfl:   •  Vienva 0.1-20 MG-MCG per tablet, , Disp: , Rfl:   •  Vortioxetine HBr (TRINTELLIX) 10 MG tablet tablet, , Disp: , Rfl:     Allergies   Allergen Reactions   • Peanuts [Peanut Oil] Anaphylaxis   • Penicillins Anaphylaxis       Objective     Physical Exam:  Vitals:    02/06/23 1500   BP: 140/78   Pulse: 92   Resp: 16   Temp: 97.6 °F (36.4 °C)   SpO2: 97%   Weight: 88.9 kg (196 lb)   Height: 175.3 cm (69\")   PainSc:   6      Body mass index is 28.94 kg/m².     Physical " Exam  Constitutional:       General: She is not in acute distress.     Appearance: Normal appearance.   HENT:      Head: Normocephalic and atraumatic.   Eyes:      Extraocular Movements: Extraocular movements intact.   Cardiovascular:      Rate and Rhythm: Normal rate and regular rhythm.      Heart sounds: No murmur heard.  Pulmonary:      Effort: Pulmonary effort is normal. No respiratory distress.      Breath sounds: Normal breath sounds. No stridor. No wheezing, rhonchi or rales.   Musculoskeletal:      Comments: She points to pain lateral left hip   There is pain on palpation of her muscles around sacrum  Bilateral lower extremities with normal muscle strength sensations and normal pulse   Skin:     Findings: No rash.   Neurological:      General: No focal deficit present.      Mental Status: She is alert.   Psychiatric:         Mood and Affect: Mood normal.              Assessment / Plan      Assessment/Plan:   Diagnoses and all orders for this visit:    1. Annual physical exam (Primary)  Assessment & Plan:  Annual exam  Pap smear will refer to gyn.   Counseled on diet and exercise including limited sweets and sugars to focus on lean meat and vegetables. Counseled on 50 minutes of moderate exercise 3 times per week.   Recommend dental and eye exam  hiv hep c screening std screening: she declines   a1c /lipid screening: she is agreeable  covid vaccine: recommend booster.   Flu vaccine : she has had at work  Pneumonia vaccine recommended   Gardisil: she believes she has had.   Tdap: up to date     Orders:  -     CBC (No Diff); Future  -     Comprehensive Metabolic Panel; Future  -     Hemoglobin A1c; Future  -     Lipid Panel; Future  -     TSH Rfx On Abnormal To Free T4; Future  -     Vitamin B12; Future  -     Vitamin D,25-Hydroxy; Future        . Exacerbation of asthma, unspecified asthma severity, unspecified whether persistent  -     albuterol sulfate  (90 Base) MCG/ACT inhaler; Inhale 2 puffs Every 6  (Six) Hours As Needed for Wheezing or Shortness of Air.  Dispense: 8 g; Refill: 0    . Cervical cancer screening  -     Ambulatory Referral to Gynecology    Other orders  -     budesonide-formoterol (SYMBICORT) 160-4.5 MCG/ACT inhaler; Inhale 2 puffs 2 (Two) Times a Day.  Dispense: 1 each; Refill: 2  -     COVID-19 Bivalent Booster (Pfizer) 12+yrs      Fall with pain  Will check xray of sacrum and left hip given pain after fall. Recommend one week of ibuprofen.Come in sooner if needed for continued pain.        Return in about 6 months (around 8/6/2023), or if symptoms worsen or fail to improve.       Jadyn Dhillon D.O.  [facility]

## 2023-02-06 NOTE — ASSESSMENT & PLAN NOTE
Annual exam  Pap smear will refer to gyn.   Counseled on diet and exercise including limited sweets and sugars to focus on lean meat and vegetables. Counseled on 50 minutes of moderate exercise 3 times per week.   Recommend dental and eye exam  hiv hep c screening std screening: she declines   a1c /lipid screening: she is agreeable  covid vaccine: recommend booster.   Flu vaccine : she has had at work  Pneumonia vaccine recommended   Gardisil: she believes she has had.   Tdap: up to date

## 2023-02-07 DIAGNOSIS — M25.552 LEFT HIP PAIN: Primary | ICD-10-CM

## 2023-02-07 LAB
25(OH)D3 SERPL-MCNC: 28.5 NG/ML (ref 30–100)
ALBUMIN SERPL-MCNC: 4.7 G/DL (ref 3.5–5.2)
ALBUMIN/GLOB SERPL: 1.7 G/DL
ALP SERPL-CCNC: 89 U/L (ref 39–117)
ALT SERPL W P-5'-P-CCNC: 15 U/L (ref 1–33)
ANION GAP SERPL CALCULATED.3IONS-SCNC: 8.8 MMOL/L (ref 5–15)
AST SERPL-CCNC: 22 U/L (ref 1–32)
BILIRUB SERPL-MCNC: 0.2 MG/DL (ref 0–1.2)
BUN SERPL-MCNC: 8 MG/DL (ref 6–20)
BUN/CREAT SERPL: 10.7 (ref 7–25)
CALCIUM SPEC-SCNC: 9.5 MG/DL (ref 8.6–10.5)
CHLORIDE SERPL-SCNC: 105 MMOL/L (ref 98–107)
CHOLEST SERPL-MCNC: 179 MG/DL (ref 0–200)
CO2 SERPL-SCNC: 25.2 MMOL/L (ref 22–29)
CREAT SERPL-MCNC: 0.75 MG/DL (ref 0.57–1)
DEPRECATED RDW RBC AUTO: 38.7 FL (ref 37–54)
EGFRCR SERPLBLD CKD-EPI 2021: 112.1 ML/MIN/1.73
ERYTHROCYTE [DISTWIDTH] IN BLOOD BY AUTOMATED COUNT: 11.8 % (ref 12.3–15.4)
GLOBULIN UR ELPH-MCNC: 2.8 GM/DL
GLUCOSE SERPL-MCNC: 76 MG/DL (ref 65–99)
HBA1C MFR BLD: 5.3 % (ref 4.8–5.6)
HCT VFR BLD AUTO: 42 % (ref 34–46.6)
HDLC SERPL-MCNC: 69 MG/DL (ref 40–60)
HGB BLD-MCNC: 14 G/DL (ref 12–15.9)
LDLC SERPL CALC-MCNC: 91 MG/DL (ref 0–100)
LDLC/HDLC SERPL: 1.29 {RATIO}
MCH RBC QN AUTO: 30.1 PG (ref 26.6–33)
MCHC RBC AUTO-ENTMCNC: 33.3 G/DL (ref 31.5–35.7)
MCV RBC AUTO: 90.3 FL (ref 79–97)
PLATELET # BLD AUTO: 395 10*3/MM3 (ref 140–450)
PMV BLD AUTO: 10.8 FL (ref 6–12)
POTASSIUM SERPL-SCNC: 4 MMOL/L (ref 3.5–5.2)
PROT SERPL-MCNC: 7.5 G/DL (ref 6–8.5)
RBC # BLD AUTO: 4.65 10*6/MM3 (ref 3.77–5.28)
SODIUM SERPL-SCNC: 139 MMOL/L (ref 136–145)
TRIGL SERPL-MCNC: 106 MG/DL (ref 0–150)
TSH SERPL DL<=0.05 MIU/L-ACNC: 2.39 UIU/ML (ref 0.27–4.2)
VIT B12 BLD-MCNC: 270 PG/ML (ref 211–946)
VLDLC SERPL-MCNC: 19 MG/DL (ref 5–40)
WBC NRBC COR # BLD: 10.33 10*3/MM3 (ref 3.4–10.8)

## 2023-02-08 DIAGNOSIS — M53.3 COCCYX PAIN: ICD-10-CM

## 2023-02-08 DIAGNOSIS — M25.552 LEFT HIP PAIN: ICD-10-CM

## 2023-02-08 DIAGNOSIS — M79.18 BUTTOCK PAIN: Primary | ICD-10-CM

## 2023-02-08 DIAGNOSIS — M53.3 SACRAL PAIN: ICD-10-CM

## 2023-02-13 ENCOUNTER — OFFICE VISIT (OUTPATIENT)
Dept: ORTHOPEDIC SURGERY | Facility: CLINIC | Age: 28
End: 2023-02-13
Payer: COMMERCIAL

## 2023-02-13 VITALS
WEIGHT: 196 LBS | SYSTOLIC BLOOD PRESSURE: 120 MMHG | HEIGHT: 69 IN | DIASTOLIC BLOOD PRESSURE: 76 MMHG | BODY MASS INDEX: 29.03 KG/M2

## 2023-02-13 DIAGNOSIS — S70.02XA CONTUSION OF LEFT HIP, INITIAL ENCOUNTER: Primary | ICD-10-CM

## 2023-02-13 PROCEDURE — 99203 OFFICE O/P NEW LOW 30 MIN: CPT | Performed by: ORTHOPAEDIC SURGERY

## 2023-02-13 NOTE — PROGRESS NOTES
[facility] [place]    Subjective     Chief Complaint   Patient presents with   • Left Hip - Pain        HPI    Humberto Gold is a 27 y.o. female who presents with new problem of: left hip pain.  Onset: mechanical fall. The issue has been ongoing for 3 week(s). Pain is a 6/10 on the pain scale. Pain is described as aching, throbbing and shooting. Associated symptoms include pain, swelling and stiffness. The pain is worse with standing, sitting, climbing stairs and lying on affected side; ice and pain medication and/or NSAID improve the pain. Previous treatments have included: nothing.  Pain is on the lateral side of her hip.  She fell on the ice approximately 3 weeks ago.  Continued pain on the lateral side of her hip since.  Taking ibuprofen.  Able to ambulate.    I have reviewed the following portions of the patient's history and agree with: History of Present Illness and Review of Systems    Patient Active Problem List   Diagnosis   • Anxiety and depression   • Mild persistent asthma   • Heart murmur   • Acne vulgaris   • Screening   • Normal gynecologic examination   • Encounter for initial prescription of contraceptive pills   • Inattention   • Asthma   • Dysmenorrhea   • Annual physical exam     Past Medical History:   Diagnosis Date   • Acne    • ADHD (attention deficit hyperactivity disorder) 7/2018   • Allergic    • Anxiety    • Asthma    • Bursitis of hip 2/7/2023    Lt hip   • Depression    • Heart murmur    • Hip arthrosis 02/07/2023    osteoarthritis   • Urinary tract infection       Past Surgical History:   Procedure Laterality Date   • BREAST SURGERY        Family History   Problem Relation Age of Onset   • Hyperlipidemia Father    • Heart disease Paternal Grandfather    • Diabetes type II Paternal Grandfather    • Arthritis Paternal Grandfather    • Cancer Paternal Grandfather         Bladder Cancer   • Diabetes Paternal Grandfather    • Hearing loss Paternal Grandfather    •  Hyperlipidemia Paternal Grandfather    • Stroke Paternal Grandfather    • Osteoporosis Paternal Grandfather    • Rheumatologic disease Paternal Grandfather    • Arthritis Paternal Grandmother    • Osteoporosis Paternal Grandmother    • Rheumatologic disease Paternal Grandmother    • Asthma Paternal Aunt    • Depression Maternal Aunt    • Osteoporosis Maternal Grandmother    • Rheumatologic disease Maternal Grandmother      Social History     Socioeconomic History   • Marital status: Single   Tobacco Use   • Smoking status: Never   • Smokeless tobacco: Never   Vaping Use   • Vaping Use: Never used   Substance and Sexual Activity   • Alcohol use: Yes     Comment: Drink only socially, consisting of maybe 2 drinks   • Drug use: No   • Sexual activity: Yes     Partners: Male     Birth control/protection: Condom, Pill      Current Outpatient Medications on File Prior to Visit   Medication Sig Dispense Refill   • albuterol (PROVENTIL) (2.5 MG/3ML) 0.083% nebulizer solution Take 2.5 mg by nebulization Every 4 (Four) Hours As Needed for Wheezing or Shortness of Air. 90 mL 0   • albuterol sulfate  (90 Base) MCG/ACT inhaler Inhale 2 puffs Every 6 (Six) Hours As Needed for Wheezing or Shortness of Air. 8 g 0   • budesonide-formoterol (SYMBICORT) 160-4.5 MCG/ACT inhaler Inhale 2 puffs 2 (Two) Times a Day. 1 each 2   • fluticasone (Flonase) 50 MCG/ACT nasal spray 2 sprays into the nostril(s) as directed by provider Daily. 1 bottle 0   • hydrOXYzine (ATARAX) 25 MG tablet      • Levocetirizine Dihydrochloride (XYZAL ALLERGY 24HR PO)      • lisdexamfetamine dimesylate (Vyvanse) 10 MG capsule      • Vienva 0.1-20 MG-MCG per tablet      • Vortioxetine HBr (TRINTELLIX) 10 MG tablet tablet        No current facility-administered medications on file prior to visit.      Allergies   Allergen Reactions   • Peanuts [Peanut Oil] Anaphylaxis   • Penicillins Anaphylaxis        Review of Systems   Constitutional: Negative for activity  "change, appetite change, chills, diaphoresis, fatigue, fever and unexpected weight change.   HENT: Negative for congestion, dental problem, drooling, ear discharge, ear pain, facial swelling, hearing loss, mouth sores, nosebleeds, postnasal drip, rhinorrhea, sinus pressure, sneezing, sore throat, tinnitus, trouble swallowing and voice change.    Eyes: Negative for photophobia, pain, discharge, redness, itching and visual disturbance.   Respiratory: Negative for apnea, cough, choking, chest tightness, shortness of breath, wheezing and stridor.    Cardiovascular: Negative for chest pain, palpitations and leg swelling.   Gastrointestinal: Negative for abdominal distention, abdominal pain, anal bleeding, blood in stool, constipation, diarrhea, nausea, rectal pain and vomiting.   Endocrine: Negative for cold intolerance, heat intolerance, polydipsia, polyphagia and polyuria.   Genitourinary: Negative for decreased urine volume, difficulty urinating, dysuria, enuresis, flank pain, frequency, genital sores, hematuria and urgency.   Musculoskeletal: Positive for arthralgias. Negative for back pain, gait problem, joint swelling, myalgias, neck pain and neck stiffness.   Skin: Negative for color change, pallor, rash and wound.   Allergic/Immunologic: Negative for environmental allergies, food allergies and immunocompromised state.   Neurological: Negative for dizziness, tremors, seizures, syncope, facial asymmetry, speech difficulty, weakness, light-headedness, numbness and headaches.   Hematological: Negative for adenopathy. Does not bruise/bleed easily.   Psychiatric/Behavioral: Negative for agitation, behavioral problems, confusion, decreased concentration, dysphoric mood, hallucinations, self-injury, sleep disturbance and suicidal ideas. The patient is not nervous/anxious and is not hyperactive.         Objective      Physical Exam  /76   Ht 175.3 cm (69\")   Wt 88.9 kg (196 lb)   BMI 28.94 kg/m²     Body mass " index is 28.94 kg/m².    General:   Mental Status:  Alert   Appearance: Cooperative, in no acute distress   Build and Nutrition: Well-nourished female   Orientation: Alert and oriented to person, place and time   Posture: Normal   Gait: Nonantalgic    Integument:   Left hip: No skin lesions, no rash, no ecchymosis    Neurologic:   Sensation:    Left foot: Intact to light touch on the dorsal and plantar aspect   Motor:  Left lower extremity: 5/5 quadriceps, hamstrings, ankle dorsiflexors, and ankle plantar flexors  Vascular:   Left lower extremity: 2+ dorsalis pedis pulse, prompt capillary refill    Lower Extremity:   Left Hip:    Tenderness:  Lateral hip    Swelling:  None    Crepitus:  None    Atrophy:  None    Range of motion:  External Rotation: 40°       Internal Rotation: 40°       Flexion:  120°       Extension:  0°   Instability:  None  Deformities:  None  Functional testing: Negative Stinchfield    No leg length discrepancy      Imaging/Studies      Imaging Results (Last 24 Hours)     ** No results found for the last 24 hours. **        XR HIP W OR WO PELVIS 2-3 VIEW LEFT     Date of Exam: 2/6/2023 4:02 PM EST     Indication: hip buttocks pain.     Comparison: None available.     FINDINGS:  Cortical margins are intact without evidence of acute fracture. Mild symmetric hip arthrosis changes are present, with some narrowing and sclerosis. The SI joints demonstrate mild symmetric degenerative change.      IMPRESSION   Cortical margins are intact without evidence of acute fracture. Mild symmetric hip arthrosis changes are present, with some narrowing and sclerosis. The SI joints demonstrate mild symmetric degenerative change.         Electronically Signed: Vimal Choudhary    2/6/2023 4:52 PM EST    Workstation ID: [id number]    I reviewed the above imaging, and agree with findings.    Assessment and Plan     Diagnoses and all orders for this visit:    1. Contusion of left hip, initial encounter (Primary)        1.  Contusion of left hip, initial encounter        Reviewed my findings with the patient.  She likely has a deep contusion and perhaps posttraumatic trochanteric bursitis following her fall on the ice.  Conservative treatment was recommended, with anti-inflammatories, ice, and physical therapy, which she already has scheduled for later this week.  I will see her back in 4 weeks to ensure improvement.  I will see her back sooner for any problems.  Further imaging may be considered if she has persistent symptoms.    Return in about 4 weeks (around 3/13/2023).      Celso Rubio MD  02/13/23  09:06 EST

## 2023-02-15 ENCOUNTER — TREATMENT (OUTPATIENT)
Dept: PHYSICAL THERAPY | Facility: CLINIC | Age: 28
End: 2023-02-15
Payer: COMMERCIAL

## 2023-02-15 DIAGNOSIS — M25.552 LEFT HIP PAIN: Primary | ICD-10-CM

## 2023-02-15 PROCEDURE — 97110 THERAPEUTIC EXERCISES: CPT | Performed by: PHYSICAL THERAPIST

## 2023-02-15 PROCEDURE — 97161 PT EVAL LOW COMPLEX 20 MIN: CPT | Performed by: PHYSICAL THERAPIST

## 2023-02-15 PROCEDURE — 97140 MANUAL THERAPY 1/> REGIONS: CPT | Performed by: PHYSICAL THERAPIST

## 2023-02-15 NOTE — PROGRESS NOTES
Physical Therapy Initial Evaluation and Plan of Care    [facility]  [address]  [place], [place] [address] [phone]    Patient: Humberto Gold   : 1995  Diagnosis/ICD-10 Code:  No primary diagnosis found.  Referring practitioner: Jadyn Dhillon DO    Subjective Evaluation    History of Present Illness  Mechanism of injury: High energy fall to left hip    Subjective comment: Slipped and fell on ice landing on her left side that resulted in left hip pain.  Has pain located along the lateral left hip.  Also, has low back pain that close to the sacral region.  No numbness or tingling in the legs.  Has a previous injury to coccyx from a fall downstairs.  Patient Occupation: [employer] [place] [employer]   Precautions and Work Restrictions: NoneQuality of life: excellent    Pain  Alleviating factors: Changing position.  Exacerbated by: Sitting prolonged periods of time; standing prolonged periods of time; lying on left side.  Progression: no change    Social Support  Lives with: significant other    Treatments  Previous treatment: medication (NSAIDs without relief; some relief from ice)  Patient Goals  Patient goals for therapy: decreased pain  Patient goal: Knowing more exercises.           *LEFS:  43/80  Objective          Passive Range of Motion     Additional Passive Range of Motion Details  *Seated hip IR:  R->43 deg; L->41 deg    Strength/Myotome Testing     Left Hip   Planes of Motion   Flexion: 4  Abduction: 3-    Right Hip   Planes of Motion   Flexion: 5  Abduction: 5    Additional Strength Details  *(+) left hip and L/S pain with active hip flx to 100 deg  *(+) left hip pain with long lever S/L hip abduction isometric      General Comments     Hip Comments   (+) tenderness over the left lateral hip to palpation         Assessment & Plan     Assessment  Impairments: abnormal or restricted ROM, activity intolerance, lacks appropriate  home exercise program, pain with function and weight-bearing intolerance  Functional Limitations: sleeping, walking, sitting and standing  Assessment details: Ms. Gold is a very pleasant 27 year old female that presents to physical therapy s/p high energy fall on 1/31/2023.  PMH is unremarkable.  Left hip mobility is limited in flexion secondary to low back and left hip pain.  Has pain with hip abduction loading against gravity.  Excellent hip IR and ER mobility.  Has signs and symptoms consistent gluteus medius/minimus bursitis/tendinopathy.  Will focus care on isometrics to reduce central sensitization.  Followed by a graded hip loading program.  Prognosis: good    Goals  Plan Goals: STGs:  1.)  LEFS improved x 1 MCID in 6 weeks.  2.)  Have 120 deg of active flexion without pain in 6 weeks.  LTGs:  1.)  Be able to lie on left side while sleeping without pain in 8 weeks.  2.)  Have no pain with sitting and/or standing during an entire work shift without pain in 12 weeks.    Plan  Therapy options: will be seen for skilled therapy services  Planned modality interventions: thermotherapy (hydrocollator packs)  Planned therapy interventions: therapeutic activities, stretching, strengthening, manual therapy, abdominal trunk stabilization, balance/weight-bearing training, functional ROM exercises and home exercise program  Frequency: 2x month  Duration in visits: 6  Duration in weeks: 12  Treatment plan discussed with: patient        Manual Therapy:   15      mins  14868;  Therapeutic Exercise:   12      mins  47871;     Neuromuscular Jamil:        mins  34037;    Therapeutic Activity:          mins  99299;     Gait Training:           mins  03801;     Ultrasound:          mins  35165;    Electrical Stimulation:         mins  80160 ( );  Dry Needling          mins self-pay    Timed Treatment:   27   mins   Total Treatment:    58    mins    PT SIGNATURE: Celso Dinero, SYLVESTER   DATE TREATMENT INITIATED:  2/15/2023    Initial Certification  Certification Period: 5/16/2023  I certify that the therapy services are furnished while this patient is under my care.  The services outlined above are required by this patient, and will be reviewed every 90 days.     PHYSICIAN: Jadny Dhillon DO  NPI: [id number]                                      DATE:    Please sign and return via fax to [phone].. Thank you, [facility].

## 2023-03-03 ENCOUNTER — TREATMENT (OUTPATIENT)
Dept: PHYSICAL THERAPY | Facility: CLINIC | Age: 28
End: 2023-03-03
Payer: COMMERCIAL

## 2023-03-03 DIAGNOSIS — M25.552 LEFT HIP PAIN: Primary | ICD-10-CM

## 2023-03-03 PROCEDURE — 97110 THERAPEUTIC EXERCISES: CPT | Performed by: PHYSICAL THERAPIST

## 2023-03-03 PROCEDURE — 97530 THERAPEUTIC ACTIVITIES: CPT | Performed by: PHYSICAL THERAPIST

## 2023-03-03 PROCEDURE — 97140 MANUAL THERAPY 1/> REGIONS: CPT | Performed by: PHYSICAL THERAPIST

## 2023-03-03 NOTE — PROGRESS NOTES
Physical Therapy Daily Progress Note    Patient: Humberto Gold   : 1995  Diagnosis/ICD-10 Code:  Left hip pain [M25.552]  Referring practitioner: Jadyn Dhillon DO  Date of Initial Visit: Type: THERAPY  Noted: 2/15/2023  Today's Date: 3/3/2023  Patient seen for 2 sessions         Humberto Gold reports that she has had increased bilateral groin pain and new onset of clicking in the past week.  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  Has taken Tylenol and NSAIDs without much relief.      Subjective     Objective   See Exercise, Manual, and Modality Logs for complete treatment.       Assessment/Plan  Focused visit on improving pain and stiffness along the rectus femoris pathway.  Has localized sensitivity and stiffness along the proximal rectus femoris tendon and mm belly pathway.  Had some relief from varying hip loading exercises today.  Will send a message to her orthopaedic surgeon about her new onset of symptoms.  Will follow up with Ms. Gold within the next 2 weeks.    *Updated HEP with written and verbal instruction (included in total time billed as TE below).       Manual Therapy:   10      mins  02899;  Therapeutic Exercise:   24      mins  90547;     Neuromuscular Jamil:        mins  90729;    Therapeutic Activity:    9      mins  99630;     Gait Training:           mins  98195;     Ultrasound:          mins  34040;    Electrical Stimulation:         mins  68170 ( );  Dry Needling          mins self-pay    Timed Treatment:   43   mins   Total Treatment:     46   mins    Celso Dinero, PT  Physical Therapist

## 2023-03-13 ENCOUNTER — OFFICE VISIT (OUTPATIENT)
Dept: ORTHOPEDIC SURGERY | Facility: CLINIC | Age: 28
End: 2023-03-13
Payer: COMMERCIAL

## 2023-03-13 VITALS
HEIGHT: 69 IN | SYSTOLIC BLOOD PRESSURE: 140 MMHG | BODY MASS INDEX: 29.03 KG/M2 | WEIGHT: 195.99 LBS | DIASTOLIC BLOOD PRESSURE: 90 MMHG

## 2023-03-13 DIAGNOSIS — G89.29 CHRONIC LEFT HIP PAIN: ICD-10-CM

## 2023-03-13 DIAGNOSIS — M25.552 CHRONIC LEFT HIP PAIN: ICD-10-CM

## 2023-03-13 DIAGNOSIS — S70.02XD CONTUSION OF LEFT HIP, SUBSEQUENT ENCOUNTER: Primary | ICD-10-CM

## 2023-03-13 PROCEDURE — 99213 OFFICE O/P EST LOW 20 MIN: CPT | Performed by: ORTHOPAEDIC SURGERY

## 2023-03-13 NOTE — PROGRESS NOTES
[facility] [place] [facility] Note    Subjective     Chief Complaint   Patient presents with   • Follow-up     4 week follow up - Contusion of left hip        HPI    It has been 4  week(s) since Ms. Gold's last visit. She returns to clinic today for follow-up of left hip pain. The issue has been ongoing for 2 month(s). She rates her pain a 5/10 on the pain scale. Previous/current treatments: physical therapy. Current symptoms: pain and popping. The pain is worse with standing and sitting; ice improve the pain. Overall, she is doing the same.  She slipped and fell on the ice with the initial onset of symptoms.  No significant improvement with physical therapy and anti-inflammatories.    I have reviewed the following portions of the patient's history and agree with: History of Present Illness and Review of Systems    Patient Active Problem List   Diagnosis   • Anxiety and depression   • Mild persistent asthma   • Heart murmur   • Acne vulgaris   • Screening   • Normal gynecologic examination   • Encounter for initial prescription of contraceptive pills   • Inattention   • Asthma   • Dysmenorrhea   • Annual physical exam     Past Medical History:   Diagnosis Date   • Acne    • ADHD (attention deficit hyperactivity disorder) 7/2018   • Allergic    • Anxiety    • Asthma    • Bursitis of hip 2/7/2023    Lt hip   • Depression    • Heart murmur    • Hip arthrosis 02/07/2023    osteoarthritis   • Urinary tract infection       Past Surgical History:   Procedure Laterality Date   • BREAST SURGERY        Family History   Problem Relation Age of Onset   • Hyperlipidemia Father    • Heart disease Paternal Grandfather    • Diabetes type II Paternal Grandfather    • Arthritis Paternal Grandfather    • Cancer Paternal Grandfather         Bladder Cancer   • Diabetes Paternal Grandfather    • Hearing loss Paternal Grandfather    • Hyperlipidemia Paternal Grandfather    • Stroke Paternal Grandfather    • Osteoporosis Paternal  Grandfather    • Rheumatologic disease Paternal Grandfather    • Arthritis Paternal Grandmother    • Osteoporosis Paternal Grandmother    • Rheumatologic disease Paternal Grandmother    • Asthma Paternal Aunt    • Depression Maternal Aunt    • Osteoporosis Maternal Grandmother    • Rheumatologic disease Maternal Grandmother      Social History     Socioeconomic History   • Marital status: Single   Tobacco Use   • Smoking status: Never   • Smokeless tobacco: Never   Vaping Use   • Vaping Use: Never used   Substance and Sexual Activity   • Alcohol use: Yes     Comment: Drink only socially, consisting of maybe 2 drinks   • Drug use: No   • Sexual activity: Yes     Partners: Male     Birth control/protection: Condom, Pill      Current Outpatient Medications on File Prior to Visit   Medication Sig Dispense Refill   • albuterol (PROVENTIL) (2.5 MG/3ML) 0.083% nebulizer solution Take 2.5 mg by nebulization Every 4 (Four) Hours As Needed for Wheezing or Shortness of Air. 90 mL 0   • albuterol sulfate  (90 Base) MCG/ACT inhaler Inhale 2 puffs Every 6 (Six) Hours As Needed for Wheezing or Shortness of Air. 8 g 0   • budesonide-formoterol (SYMBICORT) 160-4.5 MCG/ACT inhaler Inhale 2 puffs 2 (Two) Times a Day. 1 each 2   • fluticasone (Flonase) 50 MCG/ACT nasal spray 2 sprays into the nostril(s) as directed by provider Daily. 1 bottle 0   • hydrOXYzine (ATARAX) 25 MG tablet      • Levocetirizine Dihydrochloride (XYZAL ALLERGY 24HR PO)      • lisdexamfetamine dimesylate (Vyvanse) 10 MG capsule      • Vienva 0.1-20 MG-MCG per tablet      • Vortioxetine HBr (TRINTELLIX) 10 MG tablet tablet        No current facility-administered medications on file prior to visit.      Allergies   Allergen Reactions   • Peanuts [Peanut Oil] Anaphylaxis   • Penicillins Anaphylaxis        Review of Systems   Constitutional: Negative for activity change, appetite change, chills, diaphoresis, fatigue, fever and unexpected weight change.   HENT:  "Negative for congestion, dental problem, drooling, ear discharge, ear pain, facial swelling, hearing loss, mouth sores, nosebleeds, postnasal drip, rhinorrhea, sinus pressure, sneezing, sore throat, tinnitus, trouble swallowing and voice change.    Eyes: Negative for photophobia, pain, discharge, redness, itching and visual disturbance.   Respiratory: Negative for apnea, cough, choking, chest tightness, shortness of breath, wheezing and stridor.    Cardiovascular: Negative for chest pain, palpitations and leg swelling.   Gastrointestinal: Negative for abdominal distention, abdominal pain, anal bleeding, blood in stool, constipation, diarrhea, nausea, rectal pain and vomiting.   Endocrine: Negative for cold intolerance, heat intolerance, polydipsia, polyphagia and polyuria.   Genitourinary: Negative for decreased urine volume, difficulty urinating, dysuria, enuresis, flank pain, frequency, genital sores, hematuria and urgency.   Musculoskeletal: Positive for arthralgias. Negative for back pain, gait problem, joint swelling, myalgias, neck pain and neck stiffness.   Skin: Negative for color change, pallor, rash and wound.   Allergic/Immunologic: Negative for environmental allergies, food allergies and immunocompromised state.   Neurological: Negative for dizziness, tremors, seizures, syncope, facial asymmetry, speech difficulty, weakness, light-headedness, numbness and headaches.   Hematological: Negative for adenopathy. Does not bruise/bleed easily.   Psychiatric/Behavioral: Negative for agitation, behavioral problems, confusion, decreased concentration, dysphoric mood, hallucinations, self-injury, sleep disturbance and suicidal ideas. The patient is not nervous/anxious and is not hyperactive.    All other systems reviewed and are negative.       Objective      Physical Exam  /90   Ht 175.3 cm (69.02\")   Wt 88.9 kg (195 lb 15.8 oz)   BMI 28.93 kg/m²     Body mass index is 28.93 kg/m².    General:   Mental " Status:  Alert   Appearance: Cooperative, in no acute distress   Build and Nutrition: Well-nourished well-developed female   Orientation: Alert and oriented to person, place and time   Posture: Normal   Gait: Nonantalgic    Lower Extremity:              Left Hip:                          Tenderness:    Lateral hip                          Swelling:          None                          Crepitus:          None                          Atrophy:           None                          Range of motion:        External Rotation:       40°                                                              Internal Rotation:        40°, with pain                                                              Flexion:                       120°                                                              Extension:                   0°           Instability:        None  Deformities:     None  Functional testing: Positive [facility]                          No leg length discrepancy      Imaging/Studies  Imaging Results (Last 24 Hours)     ** No results found for the last 24 hours. **        No new imaging today.    Assessment and Plan     Diagnoses and all orders for this visit:    1. Contusion of left hip, subsequent encounter (Primary)    2. Chronic left hip pain  -     MRI Hip Left Without Contrast; Future        1. Contusion of left hip, subsequent encounter    2. Chronic left hip pain        I reviewed my findings with the patient.  She has persistent lateral and groin pain on the left hip, and at this point I recommended further imaging with MRI.  No significant improvement with physical therapy and anti-inflammatories.  I will see her back after the MRI for review, but sooner for any problems.    Return for After Imaging Study.      Celso Rubio MD  03/13/23  16:48 EDT

## 2023-03-22 ENCOUNTER — HOSPITAL ENCOUNTER (OUTPATIENT)
Dept: MRI IMAGING | Facility: HOSPITAL | Age: 28
Discharge: HOME | End: 2023-03-22
Admitting: ORTHOPAEDIC SURGERY
Payer: COMMERCIAL

## 2023-03-22 DIAGNOSIS — G89.29 CHRONIC LEFT HIP PAIN: ICD-10-CM

## 2023-03-22 DIAGNOSIS — M25.552 CHRONIC LEFT HIP PAIN: ICD-10-CM

## 2023-03-22 PROCEDURE — 73721 MRI JNT OF LWR EXTRE W/O DYE: CPT

## 2023-03-27 ENCOUNTER — OFFICE VISIT (OUTPATIENT)
Dept: ORTHOPEDIC SURGERY | Facility: CLINIC | Age: 28
End: 2023-03-27
Payer: COMMERCIAL

## 2023-03-27 VITALS
SYSTOLIC BLOOD PRESSURE: 136 MMHG | HEIGHT: 69 IN | BODY MASS INDEX: 29.03 KG/M2 | WEIGHT: 195.99 LBS | DIASTOLIC BLOOD PRESSURE: 88 MMHG

## 2023-03-27 DIAGNOSIS — S70.02XD CONTUSION OF LEFT HIP, SUBSEQUENT ENCOUNTER: Primary | ICD-10-CM

## 2023-03-27 DIAGNOSIS — G89.29 CHRONIC LEFT HIP PAIN: ICD-10-CM

## 2023-03-27 DIAGNOSIS — M25.552 CHRONIC LEFT HIP PAIN: ICD-10-CM

## 2023-03-27 PROCEDURE — 99214 OFFICE O/P EST MOD 30 MIN: CPT | Performed by: ORTHOPAEDIC SURGERY

## 2023-03-27 RX ORDER — MELOXICAM 7.5 MG/1
TABLET ORAL
Qty: 60 TABLET | Refills: 0 | Status: SHIPPED | OUTPATIENT
Start: 2023-03-27 | End: 2023-03-30

## 2023-03-27 NOTE — PROGRESS NOTES
[facility] [place]    Subjective     Chief Complaint   Patient presents with   • Left Hip - Follow-up     After MRI 03/22/2023        HPI    It has been 2  week(s) since Ms. Gold's last visit. She returns to clinic today for follow-up of left hip pain. The issue has been ongoing for 2.5 month(s). She rates her pain a 5/10 on the pain scale. Previous/current treatments: physical therapy. Current symptoms: pain, popping and stiffness. The pain is worse with standing, sitting, lying on affected side and rising from seated position; ice and pain medication and/or NSAID improve the pain. Overall, she is doing the same.  Here today to review the MRI results and determine further treatment.    I have reviewed the following portions of the patient's history and agree with: History of Present Illness and Review of Systems    Patient Active Problem List   Diagnosis   • Anxiety and depression   • Mild persistent asthma   • Heart murmur   • Acne vulgaris   • Screening   • Normal gynecologic examination   • Encounter for initial prescription of contraceptive pills   • Inattention   • Asthma   • Dysmenorrhea   • Annual physical exam     Past Medical History:   Diagnosis Date   • Acne    • ADHD (attention deficit hyperactivity disorder) 7/2018   • Allergic    • Anxiety    • Asthma    • Bursitis of hip 2/7/2023    Lt hip   • Depression    • Heart murmur    • Hip arthrosis 02/07/2023    osteoarthritis   • Urinary tract infection       Past Surgical History:   Procedure Laterality Date   • BREAST SURGERY        Family History   Problem Relation Age of Onset   • Hyperlipidemia Father    • Heart disease Paternal Grandfather    • Diabetes type II Paternal Grandfather    • Arthritis Paternal Grandfather    • Cancer Paternal Grandfather         Bladder Cancer   • Diabetes Paternal Grandfather    • Hearing loss Paternal Grandfather    • Hyperlipidemia Paternal Grandfather    • Stroke Paternal Grandfather    •  Osteoporosis Paternal Grandfather    • Rheumatologic disease Paternal Grandfather    • Arthritis Paternal Grandmother    • Osteoporosis Paternal Grandmother    • Rheumatologic disease Paternal Grandmother    • Asthma Paternal Aunt    • Depression Maternal Aunt    • Osteoporosis Maternal Grandmother    • Rheumatologic disease Maternal Grandmother      Social History     Socioeconomic History   • Marital status: Single   Tobacco Use   • Smoking status: Never   • Smokeless tobacco: Never   Vaping Use   • Vaping Use: Never used   Substance and Sexual Activity   • Alcohol use: Yes     Comment: Drink only socially, consisting of maybe 2 drinks   • Drug use: No   • Sexual activity: Yes     Partners: Male     Birth control/protection: Condom, Pill      Current Outpatient Medications on File Prior to Visit   Medication Sig Dispense Refill   • albuterol sulfate  (90 Base) MCG/ACT inhaler Inhale 2 puffs Every 6 (Six) Hours As Needed for Wheezing or Shortness of Air. 8 g 0   • budesonide-formoterol (SYMBICORT) 160-4.5 MCG/ACT inhaler Inhale 2 puffs 2 (Two) Times a Day. 1 each 2   • fluticasone (Flonase) 50 MCG/ACT nasal spray 2 sprays into the nostril(s) as directed by provider Daily. 1 bottle 0   • hydrOXYzine (ATARAX) 25 MG tablet      • Levocetirizine Dihydrochloride (XYZAL ALLERGY 24HR PO)      • lisdexamfetamine dimesylate (Vyvanse) 10 MG capsule      • Vienva 0.1-20 MG-MCG per tablet      • Vortioxetine HBr (TRINTELLIX) 10 MG tablet tablet      • albuterol (PROVENTIL) (2.5 MG/3ML) 0.083% nebulizer solution Take 2.5 mg by nebulization Every 4 (Four) Hours As Needed for Wheezing or Shortness of Air. 90 mL 0     No current facility-administered medications on file prior to visit.      Allergies   Allergen Reactions   • Peanuts [Peanut Oil] Anaphylaxis   • Penicillins Anaphylaxis        Review of Systems   Constitutional: Negative for activity change, appetite change, chills, diaphoresis, fatigue, fever and unexpected  "weight change.   HENT: Negative for congestion, dental problem, drooling, ear discharge, ear pain, facial swelling, hearing loss, mouth sores, nosebleeds, postnasal drip, rhinorrhea, sinus pressure, sneezing, sore throat, tinnitus, trouble swallowing and voice change.    Eyes: Negative for photophobia, pain, discharge, redness, itching and visual disturbance.   Respiratory: Negative for apnea, cough, choking, chest tightness, shortness of breath, wheezing and stridor.    Cardiovascular: Negative for chest pain, palpitations and leg swelling.   Gastrointestinal: Negative for abdominal distention, abdominal pain, anal bleeding, blood in stool, constipation, diarrhea, nausea, rectal pain and vomiting.   Endocrine: Negative for cold intolerance, heat intolerance, polydipsia, polyphagia and polyuria.   Genitourinary: Negative for decreased urine volume, difficulty urinating, dysuria, enuresis, flank pain, frequency, genital sores, hematuria and urgency.   Musculoskeletal: Positive for arthralgias. Negative for back pain, gait problem, joint swelling, myalgias, neck pain and neck stiffness.   Skin: Negative for color change, pallor, rash and wound.   Allergic/Immunologic: Negative for environmental allergies, food allergies and immunocompromised state.   Neurological: Negative for dizziness, tremors, seizures, syncope, facial asymmetry, speech difficulty, weakness, light-headedness, numbness and headaches.   Hematological: Negative for adenopathy. Does not bruise/bleed easily.   Psychiatric/Behavioral: Negative for agitation, behavioral problems, confusion, decreased concentration, dysphoric mood, hallucinations, self-injury, sleep disturbance and suicidal ideas. The patient is not nervous/anxious and is not hyperactive.         Objective      Physical Exam  /88   Ht 175.3 cm (69.02\")   Wt 88.9 kg (195 lb 15.8 oz)   BMI 28.93 kg/m²     Body mass index is 28.93 kg/m².    General:   Mental Status:  " Alert   Appearance: Cooperative, in no acute distress   Build and Nutrition: Well-nourished well-developed female   Orientation: Alert and oriented to person, place and time   Posture: Normal   Gait: Nonantalgic/normal    Lower Extremity:              Left Hip:                          Tenderness:    Lateral hip                          Crepitus:          None                          Atrophy:           None                          Range of motion:        External Rotation:       40°                                                              Internal Rotation:        40°                                                              Flexion:                       120°                                                              Extension:                   0°           Instability:        None  Deformities:     None                          No leg length discrepancy    Imaging/Studies  Imaging Results (Last 24 Hours)     ** No results found for the last 24 hours. **        MRI HIP LEFT WO CONTRAST     Date of Exam: 3/22/2023 1:39 PM EDT     Indication: Hip pain, chronic, tendon/ligament abnormality suspected, xray done  Hip pain, chronic, articular cartilage eval, xray done.     Comparison: 2/6/2023     Technique:  Routine multiplanar/multisequence sequence images of the left hip were obtained without contrast administration.       Findings:   OSSEOUS STRUCTURES  No fracture, stress reaction, avascular necrosis, or focal osseous lesion is seen. Bone marrow signal intensity is normal.     ARTICULAR CARTILAGE AND LABRUM  Articular cartilage: There is no joint space narrowing or cartilage disease.  Labrum: No labral tear or paralabral cyst is identified. The ligamentum teres is normal.        JOINT OR BURSAL EFFUSION  Joint effusion: There is no significant joint effusion.  Bursal effusion: No iliopsoas or trochanteric bursal effusion is present.     MUSCLES AND TENDONS  The rectus femoris, iliopsoas, proximal  hamstrings, quadratus femoris, and hip abductors are intact.     OTHER FINDINGS  Miscellaneous: None.        IMPRESSION:  Impression:   No evidence of internal derangement or degenerative change. Normal study.     Electronically Signed: Francine Gilbert    3/22/2023 2:56 PM EDT    Workstation ID: [id number]    I reviewed the above imaging, and agree with the findings.    Assessment and Plan     Diagnoses and all orders for this visit:    1. Contusion of left hip, subsequent encounter (Primary)  -     Ambulatory Referral to Physical Therapy Evaluate and treat, Ortho    2. Chronic left hip pain  -     Ambulatory Referral to Physical Therapy Evaluate and treat, Ortho    Other orders  -     meloxicam (MOBIC) 7.5 MG tablet; 1 Oral Daily with food.  Dispense: 60 tablet; Refill: 0        1. Contusion of left hip, subsequent encounter    2. Chronic left hip pain        I reviewed my findings with the patient.  MRI showed no bursitis and no intra-articular pathology.  I believe she still experiencing the effects of a deep contusion, and additional therapy may be of benefit.  We will also try different anti-inflammatory, and a prescription for meloxicam was provided.  I will see her back in 6 weeks to ensure improvement, but sooner for any problems.  Referral for physical therapy was provided    Return in about 6 weeks (around 5/8/2023).      Celso Rubio MD  03/27/23  08:50 EDT

## 2023-03-30 RX ORDER — MELOXICAM 7.5 MG/1
TABLET ORAL
Qty: 60 TABLET | Refills: 0 | Status: SHIPPED | OUTPATIENT
Start: 2023-03-30 | End: 2023-04-05

## 2023-04-05 RX ORDER — MELOXICAM 7.5 MG/1
TABLET ORAL
Qty: 60 TABLET | Refills: 0 | Status: SHIPPED | OUTPATIENT
Start: 2023-04-05 | End: 2023-04-20

## 2023-04-13 RX ORDER — BUDESONIDE AND FORMOTEROL FUMARATE DIHYDRATE 160; 4.5 UG/1; UG/1
2 AEROSOL RESPIRATORY (INHALATION)
Qty: 1 EACH | Refills: 2 | Status: SHIPPED | OUTPATIENT
Start: 2023-04-13

## 2023-04-13 RX ORDER — MELOXICAM 7.5 MG/1
TABLET ORAL
Qty: 60 TABLET | Refills: 0 | OUTPATIENT
Start: 2023-04-13

## 2023-04-13 NOTE — TELEPHONE ENCOUNTER
Rx Refill Note  Requested Prescriptions     Pending Prescriptions Disp Refills   • budesonide-formoterol (SYMBICORT) 160-4.5 MCG/ACT inhaler 1 each 2     Sig: Inhale 2 puffs 2 (Two) Times a Day.      Last office visit with prescribing clinician: 2/6/2023   Last telemedicine visit with prescribing clinician: 8/7/2023   Next office visit with prescribing clinician: 8/7/2023                         Would you like a call back once the refill request has been completed: [] Yes [] No    If the office needs to give you a call back, can they leave a voicemail: [] Yes [] No    Pat Sotomayor  04/13/23, 16:16 EDT

## 2023-04-20 RX ORDER — MELOXICAM 7.5 MG/1
TABLET ORAL
Qty: 60 TABLET | Refills: 0 | Status: SHIPPED | OUTPATIENT
Start: 2023-04-20

## 2023-04-21 ENCOUNTER — TREATMENT (OUTPATIENT)
Dept: PHYSICAL THERAPY | Facility: CLINIC | Age: 28
End: 2023-04-21
Payer: COMMERCIAL

## 2023-04-21 DIAGNOSIS — M25.552 LEFT HIP PAIN: Primary | ICD-10-CM

## 2023-04-21 PROCEDURE — 97110 THERAPEUTIC EXERCISES: CPT | Performed by: PHYSICAL THERAPIST

## 2023-04-21 PROCEDURE — 97162 PT EVAL MOD COMPLEX 30 MIN: CPT | Performed by: PHYSICAL THERAPIST

## 2023-04-21 NOTE — PROGRESS NOTES
Physical Therapy Initial Evaluation and Plan of Care  [facility] [place]  [address], [address]  [place] [address]    Patient: Humberto Gold   : 1995  Diagnosis/ICD-10 Code:  Left hip pain [M25.552]  Referring practitioner: Celso Rubio MD  Date of Initial Visit: 2023  Today's Date: 2023  Patient seen for 3 session         Visit Diagnoses:    ICD-10-CM ICD-9-CM   1. Left hip pain  M25.552 719.45         Subjective Questionnaire: LEFS: 68      Subjective Evaluation    History of Present Illness  Mechanism of injury: The pt reported that she slipped an fell on the stairs in 2020 and fell on her bottom. She reported pain and bruising along the buttock at that time, but she could not remember if it was the L or R side. She developed a chronic pain in the lateral L hip and B low back and stated she managed with NSAIDs as needed. 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 She has also developed pain in the R buttock in the past few months and attributes this to prolonged sitting.     In 2023 she sustained another fall on the ice and landed on the L hip. She had acute worsening of symptoms in the lateral L hip and went to PT for two visits. She was recommended by her PT to see her orthopedist for an MRI and PT was discontinued. She had a negative MRI and was referred back to PT.     Pain has been unchanged in the last two months. Lateral L hip pain is worsened with prolonged sitting, prolonged standing (>1 hour), and with squatting. Symptoms are improved with change in position. She bought an under-the-desk stepper for work and feels it helps. She has continued to stretch her hamstrings in the time from her last visit until now. She uses ice and NSAIDs as needed to decrease pain and gets a little improvement from this.               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  Exacerbated by: Sitting prolonged periods of time; standing prolonged periods of time; lying on left side.  Progression: no change    Social Support  Lives in: apartment  Lives with: significant other    Diagnostic Tests  MRI studies: normal    Treatments  Previous treatment: medication (NSAIDs without relief; some relief from ice)  Patient Goals  Patient goals for therapy: decreased pain  Patient goal: Improved tolerance to exercise.           Objective          Palpation   Left   No palpable tenderness to the proximal biceps femoris, proximal semimembranosus and proximal semitendinosus.   Hypertonic in the piriformis.   Tenderness of the gluteus ernst, gluteus medius and piriformis.     Right   No palpable tenderness to the gluteus ernst and gluteus medius.   Hypertonic in the piriformis. Tenderness of the piriformis, proximal biceps femoris, proximal semimembranosus and proximal semitendinosus.     Tenderness     Left Hip   Tenderness in the greater trochanter. No tenderness in the ischial tuberosity.     Right Hip   Tenderness in the greater trochanter and ischial tuberosity.     Active Range of Motion     Additional Active Range of Motion Details  Lumbar flexion: 0 cm to the floor   Lumbar extension: 100% - pain in low back  R Lateral flexion: 3 cm to KJL  L Lateral flexion: 3 cm to KJL  R Rotation: 35 deg - mild LBP  L Rotation: 30 deg - mild LBP    No L hip or R buttock pain with lumbar screening      Passive Range of Motion   Left Hip   Flexion: 140 degrees   External rotation (90/90): 65 degrees   Internal rotation (90/90): 45 degrees     Right Hip   Flexion: 140 degrees   External rotation (90/90): 65 degrees   Internal rotation (90/90): 45 degrees     Additional Passive  Range of Motion Details  Lateral L hip pain (comparable sign) reproduced with LEE and ROMAINEIR      Strength/Myotome Testing     Left Hip   Planes of Motion   Flexion: 5  Extension: 5  Abduction: 4  External rotation: 4+    Right Hip   Planes of Motion   Flexion: 5  Extension: 4  Abduction: 4  External rotation: 5    Left Knee   Flexion: 5  Extension: 5    Right Knee   Flexion: 5  Extension: 5    Additional Strength Details  L hip pain with resisted flexion, hip abd, and hip ER (comparable sign of lateral hip pain)    R buttock pain with resisted hamstring flexion and hip extension (comparable sign)      R prone knee flexion 4/5 with reproduction of R buttock pain (comparable sign)  L prone knee flexion 5/5 with no pain          Assessment & Plan     Assessment  Impairments: abnormal gait, abnormal muscle firing, abnormal or restricted ROM, activity intolerance, impaired balance, impaired physical strength, lacks appropriate home exercise program and pain with function  Functional Limitations: lifting, walking, uncomfortable because of pain, standing, stooping and unable to perform repetitive tasks  Assessment details: The patient is a 28 yo female who presented to PT with evolving characteristics of chronic lateral L hip pain and R buttock pain with moderate complexity. Signs and symptoms are consistent with L gluteal tendinosis and R proximal hamstring tendinitis. She had a brief stent of PT for the L hip pain a couple of months ago before being referred for an MRI, which was negative for intraarticular injury. She continues to experience lateral hip pain globally through the glute mm and ITB but most pronounced directly at the glute tendons. She should benefit from light stretching as well as isometric and eccentric exercise. R buttock pain is isolated to the proximal hamstrings and should also improve with an eccentric exercise protocol. I expect the patient to make a timely recovery with skilled PT intervention.      Prognosis: good    Goals  Plan Goals: Short Term Goals (4 weeks):     1. The patient will be independent and compliant with initial HEP.     2. The patient will report L hip pain at rest 2/10 or less and worst pain 4/10 or less.    3. The patient will display decreased TTP in the L glute tendons and proximal R hamstring tendons and dec mm tension in the surrounding musculature.    4. The patient will demonstrate inc strength evidenced by MMT as follows: hip abd 4+/5, hip ext 4+/5, hip ER 4+/5, and hip IR 4+/5.    5. LEFS will improve by 9 points or more.         Long Term Goals (8 weeks):     1. The patient will be appropriate for independent management and compliant with progressed HEP.     2. The patient will report pain at rest 1/10 or less and worst pain 3/10 or less.    3. The patient will return to work duties and/or ADLs with no limitations due to hip pain or dysfunction.    4. The patient will return to recreational and community activities with no limitations due to hip pain or dysfunction.      Plan  Therapy options: will be seen for skilled therapy services  Planned modality interventions: cryotherapy, electrical stimulation/Russian stimulation, iontophoresis, TENS and thermotherapy (hydrocollator packs)  Planned therapy interventions: ADL retraining, body mechanics training, balance/weight-bearing training, flexibility, functional ROM exercises, gait training, home exercise program, joint mobilization, manual therapy, neuromuscular re-education, postural training, soft tissue mobilization, strengthening, stretching, therapeutic activities and transfer training  Frequency: 1x week  Duration in visits: 8  Duration in weeks: 8  Treatment plan discussed with: patient  Plan details: The pt will likely benefit from TE/TA/NMED to improve strength of the hips, quads, and hamstrings, to improve balance, and to restore normal proprioception. MT will be utilized to improve ROM and jt mobility. Modalities will  be used as needed for pain modulation. Dry needling as indicated.        History # of Personal Factors and/or Comorbidities: MODERATE (1-2)  Examination of Body System(s): # of elements: MODERATE (3)  Clinical Presentation: EVOLVING  Clinical Decision Making: MODERATE      Timed:         Manual Therapy:    0     mins  15408;     Therapeutic Exercise:    10     mins  31065;     Neuromuscular Jamil:    0    mins  72673;    Therapeutic Activity:     0     mins  44140;     Gait Trainin     mins  01723;     Ultrasound:     0     mins  07354;    Ionto                               0    mins   96710  Self Care                       0     mins   66325  Canalith Repos    0     mins 28330      Un-Timed:  Electrical Stimulation:    0     mins  29556 ( );  Dry Needling     0     mins self-pay  Traction     0     mins 81027  Low Eval     0     Mins  34456  Mod Eval     35     Mins  23158  High Eval                       0     Mins  24345        Timed Treatment:   10   mins   Total Treatment:     45   mins          PT: Norm Dhillon PT     License Number: [id number]  Electronically signed by Norm Dhillon PT, 23, 1:00 PM EDT    Certification Period: 2023 thru 2023  I certify that the therapy services are furnished while this patient is under my care.  The services outlined above are required by this patient, and will be reviewed every 90 days.         Physician Signature:__________________________________________________    PHYSICIAN: Celso Rubio MD  NPI: [id number]                                      DATE:      Please sign and return via fax to [email or web address] . Thank you, [facility].

## 2023-04-26 RX ORDER — MELOXICAM 7.5 MG/1
TABLET ORAL
Qty: 60 TABLET | Refills: 0 | OUTPATIENT
Start: 2023-04-26

## 2023-04-28 ENCOUNTER — TREATMENT (OUTPATIENT)
Dept: PHYSICAL THERAPY | Facility: CLINIC | Age: 28
End: 2023-04-28
Payer: COMMERCIAL

## 2023-04-28 DIAGNOSIS — M25.552 LEFT HIP PAIN: Primary | ICD-10-CM

## 2023-04-28 NOTE — PROGRESS NOTES
Physical Therapy Daily Progress Note    Patient: Humberto Gold   : 1995  Diagnosis/ICD-10 Code:  Left hip pain [M25.552]  Referring practitioner: Celso Rubio MD  Date of Initial Visit: Type: THERAPY  Noted: 2/15/2023  Today's Date: 2023  Patient seen for 4 sessions         Humberto Gold reports that her symptoms are the same since last visit.  Right hip stretch from last visit is beneficial at reducing pain during, but not after the exercise is performed.  Had an MRI showing no labral tearing or other hip pathology bilaterally.        Subjective     Objective   See Exercise, Manual, and Modality Logs for complete treatment.       Assessment/Plan  Has reproduction of B/L hip p! With slump testing bilaterally.  Combined with sustained pressure along the mid to lower lumbar spine regions.  Focused visit on improving trunk and hip mm activity today.  Appears that symptoms are radiculopathic in nature since MRI has ruled out labral tears bilaterally.  And, based on body diagram today.  Will continue to focus on improving trunk and hip mm strength at next visit.           Manual Therapy:    15     mins  87318;  Therapeutic Exercise:    15     mins  72428;     Neuromuscular Jamil:        mins  50193;    Therapeutic Activity:     10     mins  14409;     Gait Training:           mins  07618;     Ultrasound:          mins  69719;    Electrical Stimulation:         mins  53134 ( );  Dry Needling          mins self-pay    Timed Treatment:   40   mins   Total Treatment:     40   mins    Celso Dinero PT  Physical Therapist

## 2023-05-04 RX ORDER — MELOXICAM 7.5 MG/1
TABLET ORAL
Qty: 60 TABLET | Refills: 0 | OUTPATIENT
Start: 2023-05-04

## 2023-05-05 ENCOUNTER — TREATMENT (OUTPATIENT)
Dept: PHYSICAL THERAPY | Facility: CLINIC | Age: 28
End: 2023-05-05
Payer: COMMERCIAL

## 2023-05-05 DIAGNOSIS — M25.552 LEFT HIP PAIN: Primary | ICD-10-CM

## 2023-05-05 NOTE — PROGRESS NOTES
Physical Therapy Daily Progress Note    Patient: Humberto Gold   : 1995  Diagnosis/ICD-10 Code:  Left hip pain [M25.552]  Referring practitioner: Celso Rubio MD  Date of Initial Visit: Type: THERAPY  Noted: 2/15/2023  Today's Date: 2023  Patient seen for 5 sessions         Humberto Gold reports less sensitivity in the low back.  Has not noticed much change in the hip symptoms yet.  More left hip stiffness today. Felt good after last visit and went walking.       Subjective     Objective   See Exercise, Manual, and Modality Logs for complete treatment.       Assessment/Plan  Focused visit on improving low back sensitivity and left hip stiffness via manual therapy.  Combined with exercises to improve trunk and hip strength.  Will f/u next week.    *Updated HEP with written and verbal instruction (included in total time billed as TE below).       Manual Therapy:    10     mins  56737;  Therapeutic Exercise:    28     mins  23179;     Neuromuscular Jamil:        mins  84988;    Therapeutic Activity:     18     mins  50998;     Gait Training:           mins  85681;     Ultrasound:          mins  06649;    Electrical Stimulation:         mins  94906 ( );  Dry Needling          mins self-pay    Timed Treatment:   56   mins   Total Treatment:     56   mins    Celso Dinero PT  Physical Therapist

## 2023-05-12 ENCOUNTER — TREATMENT (OUTPATIENT)
Dept: PHYSICAL THERAPY | Facility: CLINIC | Age: 28
End: 2023-05-12
Payer: COMMERCIAL

## 2023-05-12 DIAGNOSIS — M25.552 LEFT HIP PAIN: Primary | ICD-10-CM

## 2023-05-12 NOTE — PROGRESS NOTES
Physical Therapy Daily Progress Note    Patient: Humberto Gold   : 1995  Diagnosis/ICD-10 Code:  No primary diagnosis found.  Referring practitioner: Celso Rubio MD  Date of Initial Visit: No linked episodes  Today's Date: 2023  Patient seen for Visit count could not be calculated. Make sure you are using a visit which is associated with an episode. sessions         Humberto Gold reports that she has less hip pain in general.  Low back pain still present across both sides.  Low back pain pops when walking at times.  Notes most pain in the right posterior hip that wakes her up at night.        Subjective     Objective   See Exercise, Manual, and Modality Logs for complete treatment.       Assessment/Plan  Focused visit on improving lumbar spine sensitivity to pressure via manual therapy today.  Combined with exercises to improve trunk and hip strength in all planes.  Will f/u in 2 weeks for re-evaluation/progress note update.    *Updated HEP with written and verbal instruction (included in total time billed as TE below).         Manual Therapy:   15      mins  11893;  Therapeutic Exercise:   25      mins  92899;     Neuromuscular Jamil:        mins  86430;    Therapeutic Activity:    14      mins  14356;     Gait Training:           mins  09678;     Ultrasound:          mins  23543;    Electrical Stimulation:         mins  30761 ( );  Dry Needling          mins self-pay    Timed Treatment:   54   mins   Total Treatment:     54   mins    Celso Dinero PT  Physical Therapist

## 2023-05-26 ENCOUNTER — TREATMENT (OUTPATIENT)
Dept: PHYSICAL THERAPY | Facility: CLINIC | Age: 28
End: 2023-05-26

## 2023-05-26 DIAGNOSIS — M25.552 LEFT HIP PAIN: Primary | ICD-10-CM

## 2023-05-26 PROCEDURE — 97110 THERAPEUTIC EXERCISES: CPT | Performed by: PHYSICAL THERAPIST

## 2023-05-26 PROCEDURE — 97140 MANUAL THERAPY 1/> REGIONS: CPT | Performed by: PHYSICAL THERAPIST

## 2023-05-26 PROCEDURE — 97530 THERAPEUTIC ACTIVITIES: CPT | Performed by: PHYSICAL THERAPIST

## 2023-05-26 NOTE — PROGRESS NOTES
Physical Therapy Daily Progress Note    Patient: Humberto Gold   : 1995  Diagnosis/ICD-10 Code:  Left hip pain [M25.552]  Referring practitioner: Celso Rubio MD  Date of Initial Visit: Type: THERAPY  Noted: 2/15/2023  Today's Date: 2023  Patient seen for 7 sessions         Humberto Gold reports feeling better since starting physical therapy.  Has significantly reduce hip pain bilaterally.  Still has some low back pain, but is still better overall.  Is going to the gym three times per week.  This is also helping her back feel better.      Subjective     Objective   See Exercise, Manual, and Modality Logs for complete treatment.     *LEFS:  78/80  *HAJA:  8%  *Hip aBd MMT:  4+5/ B/L  *Hip ext MMT:  4+/5 B/L  *Hip flx MMT:  4+/5 B/L    Assessment/Plan  Ms. Gold has attended physical therapy for a total of 7 visits for chronic low back and bilateral hip pain.  Has improved proximal hip strength in all planes without pain during testing.  Has mild tenderness at L5/S1 region with mod grade CPA gliding.  Ms. Gold as met all goals that she wanted to obtain at this time and will perform an HEP with continued gym routine.  Will be happy to see Ms. Gold should any symptoms change for the worse.    *Updated HEP with written and verbal instruction (included in total time billed as TE below).       Manual Therapy:   15      mins  74160;  Therapeutic Exercise:   24      mins  92790;     Neuromuscular Jamil:        mins  55881;    Therapeutic Activity:   9       mins  34044;     Gait Training:           mins  55456;     Ultrasound:          mins  63909;    Electrical Stimulation:         mins  32947 ( );  Dry Needling          mins self-pay    Timed Treatment:  48    mins   Total Treatment:    48    mins    Celso Dinero, PT  Physical Therapist

## 2023-06-06 RX ORDER — MELOXICAM 7.5 MG/1
TABLET ORAL
Qty: 30 TABLET | Refills: 1 | Status: SHIPPED | OUTPATIENT
Start: 2023-06-06

## 2023-07-27 RX ORDER — MELOXICAM 7.5 MG/1
TABLET ORAL
Qty: 30 TABLET | Refills: 1 | OUTPATIENT
Start: 2023-07-27

## 2023-08-01 RX ORDER — MELOXICAM 7.5 MG/1
TABLET ORAL
Qty: 30 TABLET | Refills: 1 | OUTPATIENT
Start: 2023-08-01

## 2023-08-08 RX ORDER — MELOXICAM 7.5 MG/1
TABLET ORAL
Qty: 30 TABLET | Refills: 1 | OUTPATIENT
Start: 2023-08-08

## 2023-08-15 ENCOUNTER — OFFICE VISIT (OUTPATIENT)
Dept: FAMILY MEDICINE CLINIC | Facility: CLINIC | Age: 28
End: 2023-08-15
Payer: COMMERCIAL

## 2023-08-15 VITALS
RESPIRATION RATE: 18 BRPM | BODY MASS INDEX: 27.81 KG/M2 | SYSTOLIC BLOOD PRESSURE: 120 MMHG | HEART RATE: 94 BPM | TEMPERATURE: 98.7 F | WEIGHT: 188.4 LBS | DIASTOLIC BLOOD PRESSURE: 78 MMHG | OXYGEN SATURATION: 96 %

## 2023-08-15 DIAGNOSIS — M25.559 HIP PAIN, UNSPECIFIED LATERALITY: ICD-10-CM

## 2023-08-15 DIAGNOSIS — J45.30 MILD PERSISTENT ASTHMA WITHOUT COMPLICATION: Primary | ICD-10-CM

## 2023-08-15 PROBLEM — Z13.9 ENCOUNTER FOR HEALTH-RELATED SCREENING: Status: RESOLVED | Noted: 2017-04-17 | Resolved: 2023-08-15

## 2023-08-15 PROCEDURE — 99214 OFFICE O/P EST MOD 30 MIN: CPT | Performed by: STUDENT IN AN ORGANIZED HEALTH CARE EDUCATION/TRAINING PROGRAM

## 2023-08-15 RX ORDER — DESVENLAFAXINE SUCCINATE 50 MG/1
100 TABLET, EXTENDED RELEASE ORAL DAILY
COMMUNITY

## 2023-08-15 NOTE — PROGRESS NOTES
"Chief Complaint  Leg Pain    History of Present Illness      Hip pain  Taking meloxicam as needed for pain but she says this has been a lot better. Working from home now has helped this.       Asthma  Taking her symbicort with albuterol prn but says this is \"hardly ever\".     The following portions of the patient's history were reviewed and updated as appropriate: allergies, current medications, past family history, past medical history, past social history, past surgical history, and problem list.    OBJECTIVE:  /78   Pulse 94   Temp 98.7 øF (37.1 øC)   Resp 18   Wt 85.5 kg (188 lb 6.4 oz)   SpO2 96%   BMI 27.81 kg/mý       Physical Exam  Constitutional:       General: She is not in acute distress.     Appearance: Normal appearance.   HENT:      Head: Normocephalic and atraumatic.   Eyes:      Extraocular Movements: Extraocular movements intact.   Cardiovascular:      Rate and Rhythm: Normal rate and regular rhythm.      Heart sounds: No murmur heard.  Pulmonary:      Effort: Pulmonary effort is normal. No respiratory distress.      Breath sounds: Normal breath sounds. No stridor. No wheezing, rhonchi or rales.   Skin:     Findings: No rash.   Neurological:      General: No focal deficit present.      Mental Status: She is alert.   Psychiatric:         Mood and Affect: Mood normal.                  Assessment and Plan   Diagnoses and all orders for this visit:    1. Mild persistent asthma without complication (Primary)    2. Hip pain, unspecified laterality      Continue inhalers    Continue meloxicam prn.    Return in about 6 months (around 2/15/2024) for Annual.       Jadyn Dhillon D.O.  [facility]   "

## 2023-08-17 RX ORDER — MELOXICAM 7.5 MG/1
TABLET ORAL
Qty: 30 TABLET | Refills: 1 | OUTPATIENT
Start: 2023-08-17

## 2023-08-23 RX ORDER — MELOXICAM 7.5 MG/1
TABLET ORAL
Qty: 30 TABLET | Refills: 1 | OUTPATIENT
Start: 2023-08-23

## 2023-08-23 NOTE — TELEPHONE ENCOUNTER
Spoke to pt to discuss multiple refill requests we have received from her pharmacy for Meloxicam; She states she has tried telling her pharmacy that she actually does not need the refill yet and isn't sure why they keep sending it over to us; I told her I would refuse the refill request and also call the pharmacy to let them know to put a note on that request stating they are to wait until they hear from patient before sending any additional refill requests.     Spoke to [facility] and they are making a note on that medication to prevent any further refill requests.    Jasmyne CHU CMA ([facility] ROT

## 2023-11-16 RX ORDER — BUDESONIDE AND FORMOTEROL FUMARATE DIHYDRATE 160; 4.5 UG/1; UG/1
2 AEROSOL RESPIRATORY (INHALATION) 2 TIMES DAILY
Qty: 11 G | Refills: 0 | Status: SHIPPED | OUTPATIENT
Start: 2023-11-16

## 2023-11-16 RX ORDER — BUDESONIDE AND FORMOTEROL FUMARATE DIHYDRATE 160; 4.5 UG/1; UG/1
2 AEROSOL RESPIRATORY (INHALATION) 2 TIMES DAILY
Qty: 10.2 G | Refills: 3 | Status: SHIPPED | OUTPATIENT
Start: 2023-11-16 | End: 2023-11-16

## 2023-11-16 RX ORDER — BUDESONIDE AND FORMOTEROL FUMARATE DIHYDRATE 160; 4.5 UG/1; UG/1
2 AEROSOL RESPIRATORY (INHALATION) 2 TIMES DAILY
Qty: 11 G | Refills: 0 | Status: SHIPPED | OUTPATIENT
Start: 2023-11-16 | End: 2023-11-16 | Stop reason: SDUPTHER

## 2024-01-26 ENCOUNTER — TELEMEDICINE (OUTPATIENT)
Dept: FAMILY MEDICINE CLINIC | Facility: TELEHEALTH | Age: 29
End: 2024-01-26

## 2024-01-26 DIAGNOSIS — R11.2 NAUSEA AND VOMITING, UNSPECIFIED VOMITING TYPE: Primary | ICD-10-CM

## 2024-01-26 RX ORDER — ONDANSETRON 8 MG/1
8 TABLET, ORALLY DISINTEGRATING ORAL EVERY 8 HOURS PRN
Qty: 15 TABLET | Refills: 0 | Status: SHIPPED | OUTPATIENT
Start: 2024-01-26

## 2024-01-26 RX ORDER — PROMETHAZINE HYDROCHLORIDE 12.5 MG/1
12.5-25 TABLET ORAL EVERY 6 HOURS PRN
Qty: 20 TABLET | Refills: 0 | Status: SHIPPED | OUTPATIENT
Start: 2024-01-26

## 2024-01-26 NOTE — PROGRESS NOTES
HPI  Humberto Gold is a 28 y.o. female  presents with complaint of waking up today with N/V/D. She has been tapering off Pristiq (has been taking for a year) and today is the first day without it. She spoke with her therapist who instructed her to get zofran OTC but that is not available. She went from taking 100, to 50, to 25, to 12.5 mg and last dose was 1/24/24 at night. Denies any other symptoms.     Review of Systems    Past Medical History:   Diagnosis Date    Acne     ADHD (attention deficit hyperactivity disorder) 7/2018    Allergic     Anxiety     Asthma     Bursitis of hip 2/7/2023    Lt hip    Depression     Heart murmur     Hip arthrosis 02/07/2023    osteoarthritis    Urinary tract infection        Family History   Problem Relation Age of Onset    Hyperlipidemia Father     Heart disease Paternal Grandfather     Diabetes type II Paternal Grandfather     Arthritis Paternal Grandfather     Cancer Paternal Grandfather         Bladder Cancer    Diabetes Paternal Grandfather     Hearing loss Paternal Grandfather     Hyperlipidemia Paternal Grandfather     Stroke Paternal Grandfather     Osteoporosis Paternal Grandfather     Rheumatologic disease Paternal Grandfather     Arthritis Paternal Grandmother     Osteoporosis Paternal Grandmother     Rheumatologic disease Paternal Grandmother     Asthma Paternal Aunt     Depression Maternal Aunt     Osteoporosis Maternal Grandmother     Rheumatologic disease Maternal Grandmother        Social History     Socioeconomic History    Marital status: Single   Tobacco Use    Smoking status: Never    Smokeless tobacco: Never   Vaping Use    Vaping Use: Never used   Substance and Sexual Activity    Alcohol use: Yes     Comment: Drink only socially, consisting of maybe 2 drinks    Drug use: No    Sexual activity: Yes     Partners: Male     Birth control/protection: Condom, Pill         There were no vitals taken for this visit.    PHYSICAL EXAM  Physical Exam   Constitutional:  She appears well-developed and well-nourished.   HENT:   Head: Normocephalic.   Nose: Nose normal.   Neck: Neck normal appearance.  Pulmonary/Chest: Effort normal.   Neurological: She is alert.   Psychiatric: She has a normal mood and affect. Her speech is normal.       Diagnoses and all orders for this visit:    1. Nausea and vomiting, unspecified vomiting type (Primary)  -     ondansetron ODT (ZOFRAN-ODT) 8 MG disintegrating tablet; Place 1 tablet on the tongue Every 8 (Eight) Hours As Needed for Nausea or Vomiting.  Dispense: 15 tablet; Refill: 0  -     promethazine (PHENERGAN) 12.5 MG tablet; Take 1-2 tablets by mouth Every 6 (Six) Hours As Needed for Nausea or Vomiting.  Dispense: 20 tablet; Refill: 0          FOLLOW-UP  As discussed during visit with [facility], if symptoms worsen or fail to improve, follow-up with PCP/Urgent Care/Emergency Department.    Patient verbalizes understanding of medications, instructions for treatment and follow-up.    Caren Bull, APRN  01/26/2024  09:40 EST    The use of a video visit has been reviewed with the patient and verbal informed consent has been obtained. Myself and Humberto Gold participated in this visit. The patient is located in [place], [place], and I am located in [place], [place]. [employer] and [email or web address] Video Client were utilized.

## 2024-02-15 ENCOUNTER — LAB (OUTPATIENT)
Dept: LAB | Facility: HOSPITAL | Age: 29
End: 2024-02-15
Payer: COMMERCIAL

## 2024-02-15 ENCOUNTER — OFFICE VISIT (OUTPATIENT)
Dept: FAMILY MEDICINE CLINIC | Facility: CLINIC | Age: 29
End: 2024-02-15
Payer: COMMERCIAL

## 2024-02-15 VITALS
OXYGEN SATURATION: 100 % | HEART RATE: 60 BPM | BODY MASS INDEX: 25.33 KG/M2 | HEIGHT: 69 IN | DIASTOLIC BLOOD PRESSURE: 86 MMHG | SYSTOLIC BLOOD PRESSURE: 140 MMHG | TEMPERATURE: 98.4 F | WEIGHT: 171 LBS

## 2024-02-15 DIAGNOSIS — Z12.4 CERVICAL CANCER SCREENING: ICD-10-CM

## 2024-02-15 DIAGNOSIS — Z00.00 ANNUAL PHYSICAL EXAM: ICD-10-CM

## 2024-02-15 DIAGNOSIS — F32.A DEPRESSION, UNSPECIFIED DEPRESSION TYPE: ICD-10-CM

## 2024-02-15 DIAGNOSIS — R11.11 VOMITING WITHOUT NAUSEA, UNSPECIFIED VOMITING TYPE: ICD-10-CM

## 2024-02-15 DIAGNOSIS — Z00.00 ANNUAL PHYSICAL EXAM: Primary | ICD-10-CM

## 2024-02-15 DIAGNOSIS — R10.9 BELLY PAIN: ICD-10-CM

## 2024-02-15 PROBLEM — Z01.419 NORMAL GYNECOLOGIC EXAMINATION: Status: RESOLVED | Noted: 2017-04-17 | Resolved: 2024-02-15

## 2024-02-15 PROBLEM — Z30.011 ENCOUNTER FOR INITIAL PRESCRIPTION OF CONTRACEPTIVE PILLS: Status: RESOLVED | Noted: 2017-04-17 | Resolved: 2024-02-15

## 2024-02-15 LAB
25(OH)D3 SERPL-MCNC: 51.7 NG/ML (ref 30–100)
ALBUMIN SERPL-MCNC: 4.9 G/DL (ref 3.5–5.2)
ALBUMIN/GLOB SERPL: 1.5 G/DL
ALP SERPL-CCNC: 78 U/L (ref 39–117)
ALT SERPL W P-5'-P-CCNC: 37 U/L (ref 1–33)
ANION GAP SERPL CALCULATED.3IONS-SCNC: 15.1 MMOL/L (ref 5–15)
AST SERPL-CCNC: 28 U/L (ref 1–32)
BILIRUB SERPL-MCNC: 0.4 MG/DL (ref 0–1.2)
BUN SERPL-MCNC: 6 MG/DL (ref 6–20)
BUN/CREAT SERPL: 8.2 (ref 7–25)
CALCIUM SPEC-SCNC: 9.6 MG/DL (ref 8.6–10.5)
CHLORIDE SERPL-SCNC: 104 MMOL/L (ref 98–107)
CHOLEST SERPL-MCNC: 185 MG/DL (ref 0–200)
CO2 SERPL-SCNC: 22.9 MMOL/L (ref 22–29)
CREAT SERPL-MCNC: 0.73 MG/DL (ref 0.57–1)
DEPRECATED RDW RBC AUTO: 38.3 FL (ref 37–54)
EGFRCR SERPLBLD CKD-EPI 2021: 115 ML/MIN/1.73
ERYTHROCYTE [DISTWIDTH] IN BLOOD BY AUTOMATED COUNT: 12 % (ref 12.3–15.4)
GLOBULIN UR ELPH-MCNC: 3.3 GM/DL
GLUCOSE SERPL-MCNC: 78 MG/DL (ref 65–99)
HBA1C MFR BLD: 5.1 % (ref 4.8–5.6)
HCG SERPL QL: NEGATIVE
HCT VFR BLD AUTO: 44.8 % (ref 34–46.6)
HDLC SERPL-MCNC: 75 MG/DL (ref 40–60)
HGB BLD-MCNC: 15.4 G/DL (ref 12–15.9)
LDLC SERPL CALC-MCNC: 99 MG/DL (ref 0–100)
LDLC/HDLC SERPL: 1.32 {RATIO}
LIPASE SERPL-CCNC: 26 U/L (ref 13–60)
MCH RBC QN AUTO: 30.6 PG (ref 26.6–33)
MCHC RBC AUTO-ENTMCNC: 34.4 G/DL (ref 31.5–35.7)
MCV RBC AUTO: 88.9 FL (ref 79–97)
PLATELET # BLD AUTO: 337 10*3/MM3 (ref 140–450)
PMV BLD AUTO: 11.6 FL (ref 6–12)
POTASSIUM SERPL-SCNC: 4 MMOL/L (ref 3.5–5.2)
PROT SERPL-MCNC: 8.2 G/DL (ref 6–8.5)
RBC # BLD AUTO: 5.04 10*6/MM3 (ref 3.77–5.28)
SODIUM SERPL-SCNC: 142 MMOL/L (ref 136–145)
TRIGL SERPL-MCNC: 55 MG/DL (ref 0–150)
TSH SERPL DL<=0.05 MIU/L-ACNC: 1.1 UIU/ML (ref 0.27–4.2)
VIT B12 BLD-MCNC: 1228 PG/ML (ref 211–946)
VLDLC SERPL-MCNC: 11 MG/DL (ref 5–40)
WBC NRBC COR # BLD AUTO: 7.45 10*3/MM3 (ref 3.4–10.8)

## 2024-02-15 PROCEDURE — 84703 CHORIONIC GONADOTROPIN ASSAY: CPT

## 2024-02-15 PROCEDURE — 80061 LIPID PANEL: CPT

## 2024-02-15 PROCEDURE — 83690 ASSAY OF LIPASE: CPT

## 2024-02-15 PROCEDURE — 82306 VITAMIN D 25 HYDROXY: CPT

## 2024-02-15 PROCEDURE — 82607 VITAMIN B-12: CPT

## 2024-02-15 PROCEDURE — 36415 COLL VENOUS BLD VENIPUNCTURE: CPT

## 2024-02-15 PROCEDURE — 83013 H PYLORI (C-13) BREATH: CPT | Performed by: STUDENT IN AN ORGANIZED HEALTH CARE EDUCATION/TRAINING PROGRAM

## 2024-02-15 PROCEDURE — 80050 GENERAL HEALTH PANEL: CPT

## 2024-02-15 PROCEDURE — 83036 HEMOGLOBIN GLYCOSYLATED A1C: CPT

## 2024-02-16 LAB — UREA BREATH TEST QL: NEGATIVE

## 2024-02-29 ENCOUNTER — LAB (OUTPATIENT)
Dept: LAB | Facility: HOSPITAL | Age: 29
End: 2024-02-29
Payer: COMMERCIAL

## 2024-02-29 ENCOUNTER — OFFICE VISIT (OUTPATIENT)
Dept: GASTROENTEROLOGY | Facility: CLINIC | Age: 29
End: 2024-02-29
Payer: COMMERCIAL

## 2024-02-29 VITALS
BODY MASS INDEX: 25.15 KG/M2 | OXYGEN SATURATION: 98 % | HEART RATE: 101 BPM | WEIGHT: 169.8 LBS | HEIGHT: 69 IN | SYSTOLIC BLOOD PRESSURE: 130 MMHG | TEMPERATURE: 98.4 F | DIASTOLIC BLOOD PRESSURE: 70 MMHG

## 2024-02-29 DIAGNOSIS — R63.4 UNINTENTIONAL WEIGHT LOSS: ICD-10-CM

## 2024-02-29 DIAGNOSIS — R19.7 NAUSEA VOMITING AND DIARRHEA: ICD-10-CM

## 2024-02-29 DIAGNOSIS — R63.4 UNINTENTIONAL WEIGHT LOSS: Primary | ICD-10-CM

## 2024-02-29 DIAGNOSIS — R11.2 NAUSEA VOMITING AND DIARRHEA: ICD-10-CM

## 2024-02-29 LAB — CRP SERPL-MCNC: <0.3 MG/DL (ref 0–0.5)

## 2024-02-29 PROCEDURE — 86258 DGP ANTIBODY EACH IG CLASS: CPT

## 2024-02-29 PROCEDURE — 86364 TISS TRNSGLTMNASE EA IG CLAS: CPT

## 2024-02-29 PROCEDURE — 86140 C-REACTIVE PROTEIN: CPT

## 2024-02-29 PROCEDURE — 86231 EMA EACH IG CLASS: CPT

## 2024-02-29 PROCEDURE — 36415 COLL VENOUS BLD VENIPUNCTURE: CPT

## 2024-02-29 PROCEDURE — 82784 ASSAY IGA/IGD/IGG/IGM EACH: CPT

## 2024-02-29 NOTE — PROGRESS NOTES
New Patient Consultation     Patient Name: Humberto Gold  : 1995   MRN: [id number]     Chief Complaint:    Chief Complaint   Patient presents with    Vomiting     Nausea, diarrhea intermittent        History of Present Illness: Humberto Gold is a 28 y.o. female who is here today with medical history of heart murmur, dysmenorrhea, anxiety and depression, ADHD, hip bursitis, asthma for a Gastroenterology Consultation for nausea and vomiting, unintentional weight loss    Humberto reports intermittent episodes of nausea, vomiting, diarrhea since October.  There has been a 30 pound unintentional weight loss in this interval.    Her first episode of nausea vomiting and diarrhea began in October when she was flying to [place].  Her symptoms correlate with a change in birth control and did not occur again when she stopped said birth control.    She also had 2 episodes of nausea and vomiting following stopping her Pristiq past 4 to 6 weeks.  She has an upcoming appointment with behavioral health and plans to restart medication.  She denies suicidal ideation.  There is worsening depression and anxiety.    She is currently having some mild constipation.  She has some sense of incomplete emptying-having a BM every other day with some mild lower abdominal discomfort.    Symptoms started last October with nausea and vomiting in [place]. Those symptoms do coorelate with change in BC.      No tobacco/marijuana use. Social alcohol use    Subjective      Review of Systems:   Review of Systems   Constitutional:  Positive for unexpected weight loss. Negative for appetite change.   HENT:  Negative for trouble swallowing.    Gastrointestinal:  Positive for diarrhea, nausea and vomiting. Negative for abdominal distention, abdominal pain, anal bleeding, blood in stool, constipation, rectal pain, GERD and indigestion.       Past Medical History:   Past Medical History:   Diagnosis Date    Acne     ADHD (attention deficit  hyperactivity disorder) 7/2018    Allergic     Anxiety     Asthma     Bursitis of hip 2/7/2023    Lt hip    Depression     Heart murmur     Hip arthrosis 02/07/2023    osteoarthritis    Urinary tract infection        Past Surgical History:   Past Surgical History:   Procedure Laterality Date    BREAST SURGERY         Family History:   Family History   Problem Relation Age of Onset    Hyperlipidemia Father     Depression Maternal Aunt     Asthma Paternal Aunt     Osteoporosis Maternal Grandmother     Rheumatologic disease Maternal Grandmother     Arthritis Paternal Grandmother     Osteoporosis Paternal Grandmother     Rheumatologic disease Paternal Grandmother     Heart disease Paternal Grandfather     Diabetes type II Paternal Grandfather     Arthritis Paternal Grandfather     Cancer Paternal Grandfather         Bladder Cancer    Diabetes Paternal Grandfather     Hearing loss Paternal Grandfather     Hyperlipidemia Paternal Grandfather     Stroke Paternal Grandfather     Osteoporosis Paternal Grandfather     Rheumatologic disease Paternal Grandfather     Colon cancer Neg Hx     Colon polyps Neg Hx     Esophageal cancer Neg Hx        Social History:   Social History     Socioeconomic History    Marital status: Single   Tobacco Use    Smoking status: Never    Smokeless tobacco: Never   Vaping Use    Vaping Use: Never used   Substance and Sexual Activity    Alcohol use: Yes     Comment: Drink only socially, consisting of maybe 2 drinks    Drug use: No    Sexual activity: Yes     Partners: Male     Birth control/protection: Condom, Pill       Alcohol/Tobacco History:   Social History     Substance and Sexual Activity   Alcohol Use Yes    Comment: Drink only socially, consisting of maybe 2 drinks     Social History     Tobacco Use   Smoking Status Never   Smokeless Tobacco Never       Medications:     Current Outpatient Medications:     albuterol sulfate  (90 Base) MCG/ACT inhaler, Inhale 2 puffs Every 6 (Six)  "Hours As Needed for Wheezing or Shortness of Air., Disp: 8 g, Rfl: 0    budesonide-formoterol (SYMBICORT) 160-4.5 MCG/ACT inhaler, Inhale 2 puffs 2 (Two) Times a Day., Disp: 11 g, Rfl: 0    Levocetirizine Dihydrochloride (XYZAL ALLERGY 24HR PO), , Disp: , Rfl:     Allergies:   Allergies   Allergen Reactions    Peanuts [Peanut Oil] Anaphylaxis    Penicillins Anaphylaxis       Objective     Physical Exam:  Vital Signs:   Vitals:    02/29/24 0825   BP: 130/70   BP Location: Left arm   Patient Position: Sitting   Cuff Size: Adult   Pulse: 101   Temp: 98.4 °F (36.9 °C)   TempSrc: Temporal   SpO2: 98%   Weight: 77 kg (169 lb 12.8 oz)   Height: 175.3 cm (69.02\")     Body mass index is 25.06 kg/m².     Physical Exam  Constitutional:       General: She is not in acute distress.     Appearance: She is well-developed.   Cardiovascular:      Rate and Rhythm: Normal rate and regular rhythm.   Pulmonary:      Effort: Pulmonary effort is normal. No accessory muscle usage or respiratory distress.   Abdominal:      General: Bowel sounds are normal. There is no distension.      Palpations: Abdomen is soft. There is no mass.      Tenderness: There is no abdominal tenderness. There is no guarding. Negative signs include Hector's sign and McBurney's sign.   Lymphadenopathy:      Cervical: No cervical adenopathy.   Skin:     Coloration: Skin is not pale.      Findings: No erythema.   Neurological:      Mental Status: She is alert and oriented to person, place, and time.   Psychiatric:         Speech: Speech normal.         Behavior: Behavior normal.         Thought Content: Thought content normal.         Judgment: Judgment normal.       Reviewed referring provider office note, labs  Assessment / Plan      Assessment/Plan:   Diagnoses and all orders for this visit:    1. Unintentional weight loss (Primary)  -     Ambulatory referral for Screening EGD  -     Ambulatory Referral For Screening Colonoscopy  -     Celiac Comprehensive Panel; " Future  -     C-reactive Protein; Future    2. Nausea vomiting and diarrhea    Nausea, vomiting, diarrhea symptoms do correlate with medication changes so they could be explained.  It is also possible she has lost weight after coming off birth control and antidepressants due to the weight gain side effect of these medicines.  However with a 30 pound unintentional weight loss, I do recommend EGD/ colonoscopy.    There is also possibility this is the beginning of cyclical vomiting syndrome-follow-up after scopes, notify office with symptom return    Follow Up:   Return in about 6 weeks (around 4/11/2024), or if symptoms worsen or fail to improve.    Plan of care reviewed with the patient at the conclusion of today's visit.  Education was provided regarding diagnosis, management, and any prescribed or recommended OTC medications.  Patient verbalized understanding of and agreement with management plan.         GOGO Hernandez  [facility]

## 2024-03-01 LAB
ENDOMYSIUM IGA SER QL: NEGATIVE
GLIADIN PEPTIDE IGA SER-ACNC: 5 UNITS (ref 0–19)
GLIADIN PEPTIDE IGG SER-ACNC: 3 UNITS (ref 0–19)
IGA SERPL-MCNC: 281 MG/DL (ref 87–352)
TTG IGA SER-ACNC: <2 U/ML (ref 0–3)
TTG IGG SER-ACNC: 2 U/ML (ref 0–5)

## 2024-03-08 ENCOUNTER — HOSPITAL ENCOUNTER (OUTPATIENT)
Dept: ULTRASOUND IMAGING | Facility: HOSPITAL | Age: 29
Discharge: HOME | End: 2024-03-08
Admitting: STUDENT IN AN ORGANIZED HEALTH CARE EDUCATION/TRAINING PROGRAM
Payer: COMMERCIAL

## 2024-03-08 DIAGNOSIS — R10.9 BELLY PAIN: ICD-10-CM

## 2024-03-08 PROCEDURE — 76700 US EXAM ABDOM COMPLETE: CPT

## 2024-03-15 ENCOUNTER — TELEMEDICINE (OUTPATIENT)
Dept: PSYCHIATRY | Facility: CLINIC | Age: 29
End: 2024-03-15
Payer: COMMERCIAL

## 2024-03-15 DIAGNOSIS — F90.0 ADHD (ATTENTION DEFICIT HYPERACTIVITY DISORDER), INATTENTIVE TYPE: ICD-10-CM

## 2024-03-15 DIAGNOSIS — F41.1 GENERALIZED ANXIETY DISORDER: Primary | ICD-10-CM
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
## 2024-03-21 ENCOUNTER — TELEMEDICINE (OUTPATIENT)
Dept: PSYCHIATRY | Facility: CLINIC | Age: 29
End: 2024-03-21
Payer: COMMERCIAL

## 2024-03-21 DIAGNOSIS — F41.1 GENERALIZED ANXIETY DISORDER: ICD-10-CM

## 2024-03-21 DIAGNOSIS — F90.0 ATTENTION DEFICIT HYPERACTIVITY DISORDER (ADHD), PREDOMINANTLY INATTENTIVE TYPE: Primary | ICD-10-CM

## 2024-03-21 NOTE — PROGRESS NOTES
Date: March 21, 2024  Time In: 1:00 p.m.  Time Out: 2:04 p.m.     This provider is located [facility]), [address], [place] [address] using a secure [employer] Video Visit through [employer]. Patient is being seen remotely via telehealth at home address in [place] and stated they are in a secure environment for this session. The patient's condition being diagnosed/treated is appropriate for telemedicine. The provider identified herself as well as her credentials. The patient, and/or patients guardian, consent to be seen remotely, and when consent is given they understand that the consent allows for patient identifiable information to be sent to a third party as needed. They may refuse to be seen remotely at any time. The electronic data is encrypted and password protected, and the patient and/or guardian has been advised of the potential risks to privacy not withstanding such measures.     You have chosen to receive care through a telehealth visit.  Do you consent to use a video/audio connection for your medical care today? Yes    PROGRESS NOTE  Data:  Humberto Gold is a 28 y.o. female who presents today for follow up.     Chief Complaint: ADHD symptoms     History of Present Illness:  Individual psychotherapy was provided utlizing person-centered and CBT techniques to build rapport, encourage expression of thoughts and feelings, support self-esteem, assess symptoms, and provide support. Therapist utilized open-ended questions to encourage the development of a positive therapeutic relationship and open communication.  Patient discussed recent anxiety related to spending time with friends. Patient discussed identifying ways to improve her self-esteem and reduce guilt.  Patient and therapist discussed journaling mood in an doretha and evaluating ways to identify who she is.        Clinical Maneuvering/Intervention:    (Scales based on 0 - 10 with 10  being the worst)  Depression: 1 Anxiety: 6       Assisted patient in processing above session content; acknowledged and normalized patient’s thoughts, feelings, and concerns.  Rationalized patient thought process regarding self-esteem and identity.  Discussed triggers associated with patient's self-esteem and anxiety.  Also discussed coping skills for patient to implement such as monitoring mood, and using the ACCEPT approach.    Allowed patient to freely discuss issues without interruption or judgment. Provided safe, confidential environment to facilitate the development of positive therapeutic relationship and encourage open, honest communication. Assisted patient in identifying risk factors which would indicate the need for higher level of care including thoughts to harm self or others and/or self-harming behavior and encouraged patient to contact this office, call 911, or present to the nearest emergency room should any of these events occur. Discussed crisis intervention services and means to access. Patient adamantly and convincingly denies current suicidal or homicidal ideation or perceptual disturbance.    Assessment:   Assessment   Patient appears to maintain relative stability as compared to their baseline.  However, patient continues to struggle with anxiety which continues to cause impairment in important areas of functioning.  A result, they can be reasonably expected to continue to benefit from treatment and would likely be at increased risk for decompensation otherwise.    Mental Status Exam:   Hygiene:   good  Cooperation:  Cooperative  Eye Contact:  Good  Psychomotor Behavior:  Appropriate  Affect:  Full range  Mood: anxious  Speech:  Normal  Thought Process:  Goal directed  Thought Content:  Normal  Suicidal:  None  Homicidal:  None  Hallucinations:  None  Delusion:  None  Memory:  Intact  Orientation:  Person, Place, Time, and Situation  Reliability:  good  Insight:  Good  Judgement:   Good  Impulse Control:  Good and Fair  Physical/Medical Issues:  No        Patient's Support Network Includes:  significant other, parents, and friends     Functional Status: Mild impairment     Progress toward goal: Not at goal    Prognosis: Good with Ongoing Treatment          Plan:    Patient will continue in individual outpatient therapy with focus on improved functioning and coping skills, maintaining stability, and avoiding decompensation and the need for higher level of care.    Patient will adhere to medication regimen as prescribed and report any side effects. Patient will contact this office, call 911 or present to the nearest emergency room should suicidal or homicidal ideations occur. Provide Cognitive Behavioral Therapy and Solution Focused Therapy to improve functioning, maintain stability, and avoid decompensation and the need for higher level of care.     Return in about 29 days (around 4/19/2024).            VISIT DIAGNOSIS:     ICD-10-CM ICD-9-CM   1. Attention deficit hyperactivity disorder (ADHD), predominantly inattentive type  F90.0 314.00   2. Generalized anxiety disorder  F41.1 300.02        Psychotherapy time 60 minutes. This time is exclusive to the therapy session and separate from the time spent on activities used to meet the criteria for the E/M service (history, exam, medical decision making).        This document has been electronically signed by Shannon Ballard LCSW  March 21, 2024 16:21 EDT     Part of this note may be an electronic transcription/translation of spoken language to printed text using the Dragon Dictation System.

## 2024-04-04 RX ORDER — SODIUM, POTASSIUM,MAG SULFATES 17.5-3.13G
1 SOLUTION, RECONSTITUTED, ORAL ORAL EVERY 12 HOURS
Qty: 354 ML | Refills: 0 | Status: SHIPPED | OUTPATIENT
Start: 2024-04-04

## 2024-04-16 RX ORDER — BUDESONIDE AND FORMOTEROL FUMARATE 160; 4.5 UG/1; UG/1
2 AEROSOL, METERED RESPIRATORY (INHALATION) 2 TIMES DAILY
Qty: 10.3 G | Refills: 0 | Status: SHIPPED | OUTPATIENT
Start: 2024-04-16

## 2024-04-19 ENCOUNTER — TELEMEDICINE (OUTPATIENT)
Dept: PSYCHIATRY | Facility: CLINIC | Age: 29
End: 2024-04-19
Payer: COMMERCIAL

## 2024-04-19 DIAGNOSIS — F41.1 GENERALIZED ANXIETY DISORDER: ICD-10-CM

## 2024-04-19 DIAGNOSIS — F90.0 ADHD (ATTENTION DEFICIT HYPERACTIVITY DISORDER), INATTENTIVE TYPE: Primary | ICD-10-CM

## 2024-04-19 NOTE — PROGRESS NOTES
Date: April 19, 2024  Time In: 9:00 a.m.  Time Out: 10:05 a.m.     This provider is located at the [facility]), [address], [place] [address] using a secure [employer] Video Visit through [employer]. Patient is being seen remotely via telehealth at home address in [place] and stated they are in a secure environment for this session. The patient's condition being diagnosed/treated is appropriate for telemedicine. The provider identified herself as well as her credentials. The patient, and/or patients guardian, consent to be seen remotely, and when consent is given they understand that the consent allows for patient identifiable information to be sent to a third party as needed. They may refuse to be seen remotely at any time. The electronic data is encrypted and password protected, and the patient and/or guardian has been advised of the potential risks to privacy not withstanding such measures.     You have chosen to receive care through a telehealth visit.  Do you consent to use a video/audio connection for your medical care today? Yes    PROGRESS NOTE  Data:  Humberto Gold is a 28 y.o. female who presents today for follow up    Chief Complaint: ADHD and anxiety     History of Present Illness: Individualized psychotherapy was provided utilizing CBT and person-centered techniques.  Patient expressed concern about her relationship with her mother.  Patient reports having a strained relationship with her mother that causes her to go months without having a conversation.  Patient voiced she wants to have a better relationship with her mother. Patient and therapist explored reasons for the strained relationship and methods patient can use when communicating with her mother including developing healthy boundaries to protect her own feelings and self-confidence.  Esteem building was enhanced through reassurance, normalizing, and praise and encouragement as  appropriate.  Allowed patient to freely discuss issues without interruption or judgement with unconditional regard, active listening skills, and empathy.        Clinical Maneuvering/Intervention:    (Scales based on 0 - 10 with 10 being the worst)  Depression: 5 Anxiety: 7       Assisted patient in processing above session content; acknowledged and normalized patient’s thoughts, feelings, and concerns.  Rationalized patient thought process regarding the reasons she feels a strained relationship with her mother.  Discussed triggers associated with patient's anxiety.  Also discussed coping skills for patient to implement such as journaling, talking about her feelings with her paramour, and spending time with friends.    Allowed patient to freely discuss issues without interruption or judgment. Provided safe, confidential environment to facilitate the development of positive therapeutic relationship and encourage open, honest communication. Assisted patient in identifying risk factors which would indicate the need for higher level of care including thoughts to harm self or others and/or self-harming behavior and encouraged patient to contact this office, call 911, or present to the nearest emergency room should any of these events occur. Discussed crisis intervention services and means to access. Patient adamantly and convincingly denies current suicidal or homicidal ideation or perceptual disturbance.    Assessment:   Assessment   Patient appears to maintain relative stability as compared to their baseline.  However, patient continues to struggle with anxiety which continues to cause impairment in important areas of functioning.  A result, they can be reasonably expected to continue to benefit from treatment and would likely be at increased risk for decompensation otherwise.    Mental Status Exam:   Hygiene:   good  Cooperation:  Cooperative  Eye Contact:  Good  Psychomotor Behavior:  Appropriate  Affect:  Full  range  Mood: depressed and anxious  Speech:  Normal  Thought Process:  Goal directed  Thought Content:  Normal  Suicidal:  None  Homicidal:  None  Hallucinations:  None  Delusion:  None  Memory:  Intact  Orientation:  Person, Place, Time, and Situation  Reliability:  good  Insight:  Fair  Judgement:  Good  Impulse Control:  Good and Fair  Physical/Medical Issues:  No        Patient's Support Network Includes:  significant other, extended family, and friends    Functional Status: Mild impairment     Progress toward goal: Not at goal    Prognosis: Good with Ongoing Treatment          Plan:    Patient will continue in individual outpatient therapy with focus on improved functioning and coping skills, maintaining stability, and avoiding decompensation and the need for higher level of care.    Patient will adhere to medication regimen as prescribed and report any side effects. Patient will contact this office, call 911 or present to the nearest emergency room should suicidal or homicidal ideations occur. Provide Cognitive Behavioral Therapy and Solution Focused Therapy to improve functioning, maintain stability, and avoid decompensation and the need for higher level of care.     Return in about 5 weeks (around 5/23/2024).            VISIT DIAGNOSIS:     ICD-10-CM ICD-9-CM   1. ADHD (attention deficit hyperactivity disorder), inattentive type  F90.0 314.00   2. Generalized anxiety disorder  F41.1 300.02        Psychotherapy time 60   minutes. This time is exclusive to the therapy session and separate from the time spent on activities used to meet the criteria for the E/M service (history, exam, medical decision making).        This document has been electronically signed by Shannon Ballard LCSW  April 19, 2024 11:33 EDT     Part of this note may be an electronic transcription/translation of spoken language to printed text using the Dragon Dictation System.

## 2024-05-23 ENCOUNTER — TELEMEDICINE (OUTPATIENT)
Dept: PSYCHIATRY | Facility: CLINIC | Age: 29
End: 2024-05-23
Payer: COMMERCIAL

## 2024-05-23 DIAGNOSIS — F41.1 GENERALIZED ANXIETY DISORDER: ICD-10-CM

## 2024-05-23 DIAGNOSIS — F33.1 MODERATE EPISODE OF RECURRENT MAJOR DEPRESSIVE DISORDER: Primary | ICD-10-CM

## 2024-05-23 NOTE — PROGRESS NOTES
Date: May 23, 2024  Time In: 2;00 p.m.   Time Out: 3;00 p.m.     This provider is located at the [facility]), [address], [place] [address] using a secure [employer] Video Visit through [employer]. Patient is being seen remotely via telehealth at home address in [place] and stated they are in a secure environment for this session. The patient's condition being diagnosed/treated is appropriate for telemedicine. The provider identified herself as well as her credentials. The patient, and/or patients guardian, consent to be seen remotely, and when consent is given they understand that the consent allows for patient identifiable information to be sent to a third party as needed. They may refuse to be seen remotely at any time. The electronic data is encrypted and password protected, and the patient and/or guardian has been advised of the potential risks to privacy not withstanding such measures.     You have chosen to receive care through a telehealth visit.  Do you consent to use a video/audio connection for your medical care today? Yes    PROGRESS NOTE  Data:  Humberto Gold is a 28 y.o. female who presents today for follow up    Chief Complaint: anxiety and depression       History of Present Illness: Patient discussed her broken relationship with her mother and the challenges of trying to build back that relationship.   Patient explained the want to work on this relationship but the barriers placed before her by her mother.  Patient was tearful throughout the session as she worked through processing how she needs to approach her relationship with her mother and other family members.        Clinical Maneuvering/Intervention:    (Scales based on 0 - 10 with 10 being the worst)  Depression: 7 Anxiety: 7       Assisted patient in processing above session content; acknowledged and normalized patient’s thoughts, feelings, and concerns.  Rationalized patient  thought process regarding her relationship with her mother.  Discussed triggers associated with patient's depression and anxiety.  Also discussed coping skills for patient to implement such as breathing, journaling, and going for walks.    Allowed patient to freely discuss issues without interruption or judgment. Provided safe, confidential environment to facilitate the development of positive therapeutic relationship and encourage open, honest communication. Assisted patient in identifying risk factors which would indicate the need for higher level of care including thoughts to harm self or others and/or self-harming behavior and encouraged patient to contact this office, call 911, or present to the nearest emergency room should any of these events occur. Discussed crisis intervention services and means to access. Patient adamantly and convincingly denies current suicidal or homicidal ideation or perceptual disturbance.    Assessment:   Assessment   Patient appears to maintain relative stability as compared to their baseline.  However, patient continues to struggle with depression and anxiety which continues to cause impairment in important areas of functioning.  A result, they can be reasonably expected to continue to benefit from treatment and would likely be at increased risk for decompensation otherwise.    Mental Status Exam:   Hygiene:   good  Cooperation:  Cooperative  Eye Contact:  Good  Psychomotor Behavior:  Appropriate  Affect:  Full range  Mood: sad, depressed, and anxious  Speech:  Rapid  Thought Process:  Goal directed  Thought Content:  Mood congruent  Suicidal:  None  Homicidal:  None  Hallucinations:  None  Delusion:  None  Memory:  Intact  Orientation:  Person, Place, Time, and Situation  Reliability:  good  Insight:  Good  Judgement:  Good  Impulse Control:  Good  Physical/Medical Issues:  No        Patient's Support Network Includes:  significant other and extended family    Functional Status:  Mild impairment     Progress toward goal: Not at goal    Prognosis: Good with Ongoing Treatment          Plan:    Patient will continue in individual outpatient therapy with focus on improved functioning and coping skills, maintaining stability, and avoiding decompensation and the need for higher level of care.    Patient will adhere to medication regimen as prescribed and report any side effects. Patient will contact this office, call 911 or present to the nearest emergency room should suicidal or homicidal ideations occur. Provide Cognitive Behavioral Therapy and Solution Focused Therapy to improve functioning, maintain stability, and avoid decompensation and the need for higher level of care.     Return in about 5 weeks (around 6/27/2024). Patient was made aware of option to contact office if needs in sooner. Patient voiced she felt this was a good timeframe due to patient getting ready to move soon.  Patient advised she would contact the office if she needs to be seen sooner in a non-emergency manner.             VISIT DIAGNOSIS:     ICD-10-CM ICD-9-CM   1. Moderate episode of recurrent major depressive disorder  F33.1 296.32   2. Generalized anxiety disorder  F41.1 300.02        Psychotherapy time 60 minutes. This time is exclusive to the therapy session and separate from the time spent on activities used to meet the criteria for the E/M service (history, exam, medical decision making).        This document has been electronically signed by Shannon Ballard LCSW  May 23, 2024 17:46 EDT     Part of this note may be an electronic transcription/translation of spoken language to printed text using the Dragon Dictation System.

## 2024-06-27 ENCOUNTER — TELEMEDICINE (OUTPATIENT)
Dept: PSYCHIATRY | Facility: CLINIC | Age: 29
End: 2024-06-27
Payer: COMMERCIAL

## 2024-06-27 DIAGNOSIS — F90.2 ADHD (ATTENTION DEFICIT HYPERACTIVITY DISORDER), COMBINED TYPE: Primary | ICD-10-CM

## 2024-06-27 DIAGNOSIS — F41.1 GENERALIZED ANXIETY DISORDER: ICD-10-CM

## 2024-06-27 NOTE — PROGRESS NOTES
"Date: June 27, 2024  Time In: 2:00 p.m.   Time Out: 3:00 p.m.     This provider is located at the [facility] ([facility]), [address], [place] [address] using a secure [employer] Video Visit through [employer]. Patient is being seen remotely via telehealth at home address in [place] and stated they are in a secure environment for this session. The patient's condition being diagnosed/treated is appropriate for telemedicine. The provider identified herself as well as her credentials. The patient, and/or patients guardian, consent to be seen remotely, and when consent is given they understand that the consent allows for patient identifiable information to be sent to a third party as needed. They may refuse to be seen remotely at any time. The electronic data is encrypted and password protected, and the patient and/or guardian has been advised of the potential risks to privacy not withstanding such measures.     You have chosen to receive care through a telehealth visit.  Do you consent to use a video/audio connection for your medical care today? Yes    PROGRESS NOTE  Data:  Humberto Gold is a 28 y.o. female who presents today for follow up    Chief Complaint: ADHD symptoms, anxiety     History of Present Illness: Patient informed her and her paramour have recently moved into a new town home and she really likes the new space.  Patient discussed she would like to have a session with her mother next time to attempt to build their relationship.  Patient wants to be able to \"put the fight behind them\" and to be able to \"be present\" with her.  Patient informed she finds it difficult to spend time with her mother due to her mother's criticism.  Patient informed her father and significant other are support of her and she feels she has a good relationship with other family members.        Clinical Maneuvering/Intervention:    (Scales based on 0 - 10 with 10 being the " worst)  Depression: 7 Anxiety: 8       Assisted patient in processing above session content; acknowledged and normalized patient’s thoughts, feelings, and concerns.  Rationalized patient thought process regarding the difficult relationship with her mother.  Discussed triggers associated with patient's ADHD symptoms and anxiety.  Also discussed coping skills for patient to implement such as spending time with her significant other, caring for her animals, and spending time with friends.    Allowed patient to freely discuss issues without interruption or judgment. Provided safe, confidential environment to facilitate the development of positive therapeutic relationship and encourage open, honest communication. Assisted patient in identifying risk factors which would indicate the need for higher level of care including thoughts to harm self or others and/or self-harming behavior and encouraged patient to contact this office, call 911, or present to the nearest emergency room should any of these events occur. Discussed crisis intervention services and means to access. Patient adamantly and convincingly denies current suicidal or homicidal ideation or perceptual disturbance.    Assessment:   Assessment   Patient appears to maintain relative stability as compared to their baseline.  However, patient continues to struggle with ADHD related symptoms and anxiety which continues to cause impairment in important areas of functioning.  A result, they can be reasonably expected to continue to benefit from treatment and would likely be at increased risk for decompensation otherwise.    Mental Status Exam:   Hygiene:   good  Cooperation:  Cooperative  Eye Contact:  Good  Psychomotor Behavior:  Appropriate  Affect:  Appropriate  Mood: sad and anxious  Speech:  Rapid  Thought Process:  Goal directed  Thought Content:  Mood congruent  Suicidal:  None  Homicidal:  None  Hallucinations:  None  Delusion:  None  Memory:   Intact  Orientation:  Person, Place, Time, and Situation  Reliability:  good  Insight:  Fair  Judgement:  Good  Impulse Control:  Good  Physical/Medical Issues:  No        Patient's Support Network Includes:  significant other, father, extended family, and friends    Functional Status: Mild impairment     Progress toward goal: Not at goal    Prognosis: Good with Ongoing Treatment          Plan:    Patient will continue in individual outpatient therapy with focus on improved functioning and coping skills, maintaining stability, and avoiding decompensation and the need for higher level of care.    Patient will adhere to medication regimen as prescribed and report any side effects. Patient will contact this office, call 911 or present to the nearest emergency room should suicidal or homicidal ideations occur. Provide Cognitive Behavioral Therapy and Solution Focused Therapy to improve functioning, maintain stability, and avoid decompensation and the need for higher level of care.     Return in about 22 days (around 7/19/2024).            VISIT DIAGNOSIS:     ICD-10-CM ICD-9-CM   1. ADHD (attention deficit hyperactivity disorder), combined type  F90.2 314.01   2. Generalized anxiety disorder  F41.1 300.02        Psychotherapy time 60 minutes. This time is exclusive to the therapy session and separate from the time spent on activities used to meet the criteria for the E/M service (history, exam, medical decision making).        This document has been electronically signed by Shannon Ballard LCSW  June 27, 2024 17:47 EDT     Part of this note may be an electronic transcription/translation of spoken language to printed text using the Dragon Dictation System.

## 2024-07-25 ENCOUNTER — TELEMEDICINE (OUTPATIENT)
Dept: PSYCHIATRY | Facility: CLINIC | Age: 29
End: 2024-07-25
Payer: COMMERCIAL

## 2024-07-25 DIAGNOSIS — F90.2 ADHD (ATTENTION DEFICIT HYPERACTIVITY DISORDER), COMBINED TYPE: Primary | ICD-10-CM

## 2024-07-25 DIAGNOSIS — F41.1 GENERALIZED ANXIETY DISORDER: ICD-10-CM

## 2024-07-25 NOTE — PROGRESS NOTES
Date: July 25, 2024  Time In: 2:00 p.m.    Time Out: 3:05 p.m.    This provider is located at the [facility] ([facility]), [address], [place] [address] using a secure [employer] Video Visit through [employer]. Patient is being seen remotely via telehealth at home address in [place] and stated they are in a secure environment for this session. The patient's condition being diagnosed/treated is appropriate for telemedicine. The provider identified herself as well as her credentials. The patient, and/or patients guardian, consent to be seen remotely, and when consent is given they understand that the consent allows for patient identifiable information to be sent to a third party as needed. They may refuse to be seen remotely at any time. The electronic data is encrypted and password protected, and the patient and/or guardian has been advised of the potential risks to privacy not withstanding such measures.     You have chosen to receive care through a telehealth visit.  Do you consent to use a video/audio connection for your medical care today? Yes    PROGRESS NOTE  Data:  Humberto Gold is a 28 y.o. female who presents today for follow up    Chief Complaint: ADHD symptoms, anxiety     History of Present Illness: Patient discussed that she has had family in recently and enjoyed spending time with them.  Patient informed she continues to struggle with her relationship with her mother. Patient discussed the need to please her mother and continue to meet her mother's expectations and how stressful this feels to her.  Patient and therapist discussed releasing herself from survival mode and allowing herself to figure out who she is through trial and error.  Patient and therapist discussed the ability to have flexible thinking and changing patient's negative self-talk.  Patient discussed the supportive family members around her and her partner and how they can assist her  through allowing her to verbally process her thoughts and feelings.       Clinical Maneuvering/Intervention:    (Scales based on 0 - 10 with 10 being the worst)  Depression: 1 Anxiety: 4       Assisted patient in processing above session content; acknowledged and normalized patient’s thoughts, feelings, and concerns.  Rationalized patient thought process regarding the relationship with her mother and beginning to prioritize herself and who she is.  Discussed triggers associated with patient's anxiety and difficulty with executive functions related to her ADHD diagnosis.  Also discussed coping skills for patient to implement such as talking with friends and family about her feelings, work through negative talk to change it, and making a list of traits she wants to see in herself.    Allowed patient to freely discuss issues without interruption or judgment. Provided safe, confidential environment to facilitate the development of positive therapeutic relationship and encourage open, honest communication. Assisted patient in identifying risk factors which would indicate the need for higher level of care including thoughts to harm self or others and/or self-harming behavior and encouraged patient to contact this office, call 911, or present to the nearest emergency room should any of these events occur. Discussed crisis intervention services and means to access. Patient adamantly and convincingly denies current suicidal or homicidal ideation or perceptual disturbance.    Assessment:   Assessment   Patient appears to maintain relative stability as compared to their baseline.  However, patient continues to struggle with ADHD symptoms and anxiety which continues to cause impairment in important areas of functioning.  A result, they can be reasonably expected to continue to benefit from treatment and would likely be at increased risk for decompensation otherwise.    Mental Status Exam:   Hygiene:   good  Cooperation:   Cooperative  Eye Contact:  Good  Psychomotor Behavior:  Appropriate  Affect:  Appropriate  Mood: sad and anxious  Speech:  Normal  Thought Process:  Goal directed  Thought Content:  Mood congruent  Suicidal:  None  Homicidal:  None  Hallucinations:  None  Delusion:  None  Memory:  Intact  Orientation:  Person, Place, Time, and Situation  Reliability:  good  Insight:  Good  Judgement:  Good  Impulse Control:  Fair  Physical/Medical Issues:  No        Patient's Support Network Includes:  significant other, parents, extended family, and friends    Functional Status: Mild impairment     Progress toward goal: Not at goal    Prognosis: Good with Ongoing Treatment          Plan:    Patient will continue in individual outpatient therapy with focus on improved functioning and coping skills, maintaining stability, and avoiding decompensation and the need for higher level of care.    Patient will adhere to medication regimen as prescribed and report any side effects. Patient will contact this office, call 911 or present to the nearest emergency room should suicidal or homicidal ideations occur. Provide Cognitive Behavioral Therapy and Solution Focused Therapy to improve functioning, maintain stability, and avoid decompensation and the need for higher level of care.     Return in about 22 days (around 8/16/2024).            VISIT DIAGNOSIS:     ICD-10-CM ICD-9-CM   1. ADHD (attention deficit hyperactivity disorder), combined type  F90.2 314.01   2. Generalized anxiety disorder  F41.1 300.02        Psychotherapy time 60 minutes. This time is exclusive to the therapy session and separate from the time spent on activities used to meet the criteria for the E/M service (history, exam, medical decision making).        This document has been electronically signed by Shannon Ballard LCSW  July 25, 2024 17:03 EDT     Part of this note may be an electronic transcription/translation of spoken language to printed text using the Dragon  Dictation System.

## 2024-07-29 ENCOUNTER — OFFICE VISIT (OUTPATIENT)
Dept: OBSTETRICS AND GYNECOLOGY | Facility: CLINIC | Age: 29
End: 2024-07-29
Payer: COMMERCIAL

## 2024-07-29 VITALS
HEART RATE: 96 BPM | DIASTOLIC BLOOD PRESSURE: 80 MMHG | BODY MASS INDEX: 23.99 KG/M2 | WEIGHT: 162 LBS | TEMPERATURE: 98.7 F | OXYGEN SATURATION: 99 % | HEIGHT: 69 IN | SYSTOLIC BLOOD PRESSURE: 128 MMHG

## 2024-07-29 DIAGNOSIS — Z01.419 ENCOUNTER FOR GYNECOLOGICAL EXAMINATION WITHOUT ABNORMAL FINDING: Primary | ICD-10-CM

## 2024-07-29 DIAGNOSIS — N89.8 VAGINAL DISCHARGE: ICD-10-CM

## 2024-07-29 DIAGNOSIS — D24.1 BREAST FIBROADENOMA IN FEMALE, RIGHT: ICD-10-CM

## 2024-07-29 DIAGNOSIS — Z87.42 HISTORY OF ABNORMAL CERVICAL PAP SMEAR: ICD-10-CM

## 2024-07-29 PROCEDURE — 99459 PELVIC EXAMINATION: CPT

## 2024-07-29 PROCEDURE — 99213 OFFICE O/P EST LOW 20 MIN: CPT

## 2024-07-29 PROCEDURE — 99395 PREV VISIT EST AGE 18-39: CPT

## 2024-07-29 NOTE — PROGRESS NOTES
Subjective   Chief Complaint   Patient presents with    Gynecologic Exam     Last pap 2019  Hx of abnormal pap    Establish Care    Menstrual Problem     Stopped BC in Oct, periods slowly becoming more regular  Denies heavy bleeding or extreme cramping     Humberto Gold is a 28 y.o. year old  presenting to be seen for her annual exam. She is overall feeling well. She was on continuous OCPs but stopped them in October. Her periods are working back to normal.     SEXUAL Hx:  She is currently sexually active.  In the past year there there has been NO new sexual partners.    Condoms are always used.  She would not like to be screened for STD's at today's exam.  Current birth control method: vasectomy and condoms until vasectomy is cleared .  She is happy with her current method of contraception and does not want to discuss alternative methods of contraception.  MENSTRUAL Hx:  Patient's last menstrual period was 2024.  In the past 6 months her cycles have been  beginning to regulate more after long term BLAKE use .  Her menstrual flow is typically moderately heavy.   Each month on average there are roughly 3 day(s) of very heavy flow.    Intermenstrual bleeding is absent.    Post-coital bleeding is absent.  Dysmenorrhea: moderate and is not affecting her activities of daily living  PMS: mild and is not affecting her activities of daily living  Her cycles are not a source of concern for her that she wishes to discuss today.  HEALTH Hx:  She exercises regularly: no (but is planning to start exercising more).  She wears her seat belt: yes.  She has concerns about domestic violence: no.  OTHER THINGS SHE WANTS TO DISCUSS TODAY:  Nothing else    The following portions of the patient's history were reviewed and updated as appropriate:problem list, current medications, allergies, past family history, past medical history, past social history, and past surgical history.    Social History    Tobacco Use       "Smoking status: Never      Smokeless tobacco: Never    Past Medical History:   Diagnosis Date    Abnormal Pap smear of cervix 07/18    Acne     ADHD (attention deficit hyperactivity disorder) 7/2018    Allergic     Anxiety     Asthma     Bursitis of hip 2/7/2023    Lt hip    Depression     Heart murmur     Hip arthrosis 02/07/2023    osteoarthritis    Urinary tract infection      Past Surgical History:   Procedure Laterality Date    BREAST BIOPSY  07/2018    BREAST SURGERY       Paternal grandfather diagnosed with colon ca at age 93; father negative screenings.     Review of Systems   Constitutional: Negative.  Negative for appetite change and diaphoresis.   Respiratory: Negative.     Cardiovascular: Negative.    Gastrointestinal: Negative.  Negative for abdominal distention, blood in stool, GERD and indigestion.   Endocrine: Negative.    Genitourinary: Negative.  Negative for breast discharge, breast lump, breast pain, dysuria and hematuria.   Skin: Negative.           Objective   /80 (BP Location: Right arm, Patient Position: Sitting, Cuff Size: Adult)   Pulse 96   Temp 98.7 °F (37.1 °C) (Infrared)   Ht 175.3 cm (69.02\")   Wt 73.5 kg (162 lb)   LMP 06/25/2024   SpO2 99%   BMI 23.91 kg/m²     Physical Exam  Vitals and nursing note reviewed. Exam conducted with a chaperone present.   Constitutional:       Appearance: Normal appearance. She is normal weight.   Cardiovascular:      Rate and Rhythm: Normal rate and regular rhythm.      Heart sounds: Normal heart sounds.   Pulmonary:      Effort: Pulmonary effort is normal.      Breath sounds: Normal breath sounds.   Chest:   Breasts:     Right: Normal.      Left: Normal.   Abdominal:      General: Abdomen is flat. Bowel sounds are normal.      Palpations: Abdomen is soft.   Genitourinary:     General: Normal vulva.      Exam position: Lithotomy position.      Vagina: Normal.      Cervix: Normal. Friability present.      Uterus: Normal.       Adnexa: Right " adnexa normal and left adnexa normal.      Rectum: Normal.      Comments: Thick homogenous discharge noted  Mild CMT tenderness noted  Rectal exam not done but appears normal visually  Neurological:      Mental Status: She is alert and oriented to person, place, and time.   Psychiatric:         Mood and Affect: Mood normal.         Behavior: Behavior normal.         Thought Content: Thought content normal.         Judgment: Judgment normal.            Diagnoses and all orders for this visit:    1. Encounter for gynecological examination without abnormal finding (Primary)  -     LIQUID-BASED PAP SMEAR WITH HPV GENOTYPING REGARDLESS OF INTERPRETATION (DENICE,[facility]AD); Future    2. History of abnormal cervical Pap smear    3. Breast fibroadenoma in female, right    4. Vaginal discharge  -     Candida panel, PCR - Swab, Vagina; Future  -     Gardnerella vaginalis, Trichomonas vaginalis, Candida albicans, DNA - , Cervix; Future  -     Chlamydia trachomatis, Neisseria gonorrhoeae, Trichomonas vaginalis, PCR - Swab, Cervix; Future        No prescription was given or electronically sent at today's visit    The importance of keeping all planned follow-up and taking all medications as prescribed was emphasized.    Today I discussed with Humberto the total recommended calcium intake for a premenopausal female is 1000 mg.  Ideally this should be from dietary sources.  I reviewed calcium content in various foods including milk, fortified orange juice and yogurt.  If she cannot get sufficient calcium through dietary means, it is recommended to supplement with either a multivitamin or calcium to reach her daily goal.  I also reviewed the difference in the bioavailability of calcium carbonate and calcium citrate containing supplements and the importance of taking calcium carbonate containing products with food.  Finally, vitamin D's role in calcium absorption was reviewed and a total daily vitamin D intake of 800 units was  recommended.    I discussed with Humberto that she may be behind on needed vaccinations for  vaccines are up to date to her recollection- she will check with medical records .  She may be able to obtain these vaccinations at her local pharmacy OR speak about obtaining them with her primary care.  If she does obtain her vaccines, I have asked Humberto to let us know the date each vaccine was obtained so that her medical record could be updated in our system.    No orders of the defined types were placed in this encounter.           Return in about 1 year (around 7/29/2025) for Annual physical.    Shi Reeder, GOGO  July 29, 2024

## 2024-08-06 LAB — REF LAB TEST METHOD: NORMAL

## 2024-08-16 ENCOUNTER — TELEMEDICINE (OUTPATIENT)
Dept: PSYCHIATRY | Facility: CLINIC | Age: 29
End: 2024-08-16
Payer: COMMERCIAL

## 2024-08-16 DIAGNOSIS — F33.41 RECURRENT MAJOR DEPRESSIVE DISORDER, IN PARTIAL REMISSION: ICD-10-CM

## 2024-08-16 DIAGNOSIS — F41.1 GENERALIZED ANXIETY DISORDER: Primary | ICD-10-CM

## 2024-08-16 NOTE — PROGRESS NOTES
"Date: August 16, 2024  Time In: 8:00 a.m.   Time Out: 9:00 a.m.    This provider is located at [facility]), [address], [place] [address] using a secure [employer] Video Visit through [employer]. Patient is being seen remotely via telehealth at home address in [place] and stated they are in a secure environment for this session. The patient's condition being diagnosed/treated is appropriate for telemedicine. The provider identified herself as well as her credentials. The patient, and/or patients guardian, consent to be seen remotely, and when consent is given they understand that the consent allows for patient identifiable information to be sent to a third party as needed. They may refuse to be seen remotely at any time. The electronic data is encrypted and password protected, and the patient and/or guardian has been advised of the potential risks to privacy not withstanding such measures.     You have chosen to receive care through a telehealth visit.  Do you consent to use a video/audio connection for your medical care today? Yes    PROGRESS NOTE  Data:  Humberto Gold is a 28 y.o. female who presents today for follow up    Chief Complaint: anxiety and depression     History of Present Illness: Patient presents with feelings of anxiousness related to her relationship with her mother.  Patient has invited her mother into the last session and this session and her mother has not been able to make it to either session.  Patient is looking forward to resolving a conflict that has been between them for around a year now and is delaying patient from moving forward with her wedding.  Patient voiced she recently had a \"normal\" conversation with her father for the first time in several months and has the support of her fiance. Patient's treatment plan was completed this date.       Clinical Maneuvering/Intervention:    (Scales based on 0 - 10 with 10 being " "the worst)  Depression: 7 Anxiety: 7       Assisted patient in processing above session content; acknowledged and normalized patient’s thoughts, feelings, and concerns.  Rationalized patient thought process regarding differences between her mother and her and the feelings it is causing her to have.  Discussed triggers associated with patient's anxiety and depression.  Also discussed coping skills for patient to implement such as using healthy boundaries with her mother, using \"I\" statements and facts when speaking to her mother, and writing a letter to her mother using the thoughts in her head space and waiting to share it until she is comfortable.    Allowed patient to freely discuss issues without interruption or judgment. Provided safe, confidential environment to facilitate the development of positive therapeutic relationship and encourage open, honest communication. Assisted patient in identifying risk factors which would indicate the need for higher level of care including thoughts to harm self or others and/or self-harming behavior and encouraged patient to contact this office, call 911, or present to the nearest emergency room should any of these events occur. Discussed crisis intervention services and means to access. Patient adamantly and convincingly denies current suicidal or homicidal ideation or perceptual disturbance.    Assessment:   Assessment   Patient appears to maintain relative stability as compared to their baseline.  However, patient continues to struggle with depression and anxiety which continues to cause impairment in important areas of functioning.  A result, they can be reasonably expected to continue to benefit from treatment and would likely be at increased risk for decompensation otherwise.    Mental Status Exam:   Hygiene:   good  Cooperation:  Cooperative  Eye Contact:  Good  Psychomotor Behavior:  Appropriate  Affect:  Appropriate  Mood: depressed and anxious  Speech:  " Normal  Thought Process:  Goal directed  Thought Content:  Mood congruent  Suicidal:  None  Homicidal:  None  Hallucinations:  None  Delusion:  None  Memory:  Intact  Orientation:  Person, Place, Time, and Situation  Reliability:  good  Insight:  Fair  Judgement:  Good  Impulse Control:  Good  Physical/Medical Issues:  No        Patient's Support Network Includes:  significant other and extended family    Functional Status: Mild impairment     Progress toward goal: Not at goal    Prognosis: Good with Ongoing Treatment          Plan:    Patient will continue in individual outpatient therapy with focus on improved functioning and coping skills, maintaining stability, and avoiding decompensation and the need for higher level of care.    Patient will adhere to medication regimen as prescribed and report any side effects. Patient will contact this office, call 911 or present to the nearest emergency room should suicidal or homicidal ideations occur. Provide Cognitive Behavioral Therapy and Solution Focused Therapy to improve functioning, maintain stability, and avoid decompensation and the need for higher level of care.     Return in about 1 month (around 9/19/2024).            VISIT DIAGNOSIS:     ICD-10-CM ICD-9-CM   1. Generalized anxiety disorder  F41.1 300.02   2. Recurrent major depressive disorder, in partial remission  F33.41 296.35        Psychotherapy time 60 minutes. This time is exclusive to the therapy session and separate from the time spent on activities used to meet the criteria for the E/M service (history, exam, medical decision making).        This document has been electronically signed by Shannon Ballard LCSW  August 16, 2024 09:36 EDT     Part of this note may be an electronic transcription/translation of spoken language to printed text using the Dragon Dictation System.

## 2024-08-16 NOTE — TREATMENT PLAN
Multi-Disciplinary Problems (from Behavioral Health Treatment Plan)      Active Problems       Problem: Anxiety  Start Date: 08/16/24      Problem Details: The patient self-scales this problem as a 7 with 10 being the worst.          Goal Priority Start Date Expected End Date End Date    Patient will develop and implement behavioral and cognitive strategies to reduce anxiety and irrational fears. High 08/16/24 02/14/25 --    Goal Details: Progress toward goal:  The patient self-scales their progress related to this goal as a 7 with 10 being the worst.          Goal Intervention Frequency Start Date End Date    Help patient explore past emotional issues in relation to present anxiety. Weekly 08/16/24 --    Intervention Details: Duration of treatment until discharged.          Goal Intervention Frequency Start Date End Date    Help patient develop an awareness of their cognitive and physical responses to anxiety. Weekly 08/16/24 --    Intervention Details: Duration of treatment until discharged.                  Problem: Depression  Start Date: 08/16/24      Problem Details: The patient self-scales this problem as a 7 with 10 being the worst.          Goal Priority Start Date Expected End Date End Date    Patient will demonstrate the ability to initiate new constructive life skills outside of sessions on a consistent basis. High 08/16/24 02/14/25 --    Goal Details: Progress toward goal:  The patient self-scales their progress related to this goal as a 7 with 10 being the worst.          Goal Intervention Frequency Start Date End Date    Assist patient in setting attainable activities of daily living goals. PRN 08/16/24 --      Goal Intervention Frequency Start Date End Date    Provide education about depression Weekly 08/16/24 --    Intervention Details: Duration of treatment until discharged.          Goal Intervention Frequency Start Date End Date    Assist patient in developing healthy coping strategies. Weekly  08/16/24 --    Intervention Details: Duration of treatment until discharged.                  Problem: ADHD (Adult)  Start Date: 08/16/24      Problem Details: The patient self-scales this problem as a 7 with 10 being the worst.        Goal Priority Start Date Expected End Date End Date    Patient will sustain attention and concentration to complete chores, and work responsibilites and increase positive interaction in all relationships. High 08/16/24 02/14/25 --    Goal Details: Progress toward goal:  The patient self-scales their progress related to this goal as a 7 with 10 being the worst.        Goal Intervention Frequency Start Date End Date    Assist patient in setting responsible goals and breaking down large tasks. Weekly 08/16/24 --    Intervention Details: Duration of treatment until discharged.        Goal Intervention Frequency Start Date End Date    Assist patient in using self monitoring checklist to improve attention, work performance, and social skills. Weekly 08/16/24 --    Intervention Details: Duration of treatment until discharged.                               I have discussed and reviewed this treatment plan with the patient.  It has been printed for signatures.

## 2024-09-10 DIAGNOSIS — J45.901 EXACERBATION OF ASTHMA, UNSPECIFIED ASTHMA SEVERITY, UNSPECIFIED WHETHER PERSISTENT: ICD-10-CM

## 2024-09-10 RX ORDER — BUDESONIDE AND FORMOTEROL FUMARATE DIHYDRATE 160; 4.5 UG/1; UG/1
2 AEROSOL RESPIRATORY (INHALATION) 2 TIMES DAILY
Qty: 10.3 G | Refills: 0 | Status: SHIPPED | OUTPATIENT
Start: 2024-09-10

## 2024-09-10 RX ORDER — ALBUTEROL SULFATE 90 UG/1
2 AEROSOL, METERED RESPIRATORY (INHALATION) EVERY 6 HOURS PRN
Qty: 8 G | Refills: 0 | Status: SHIPPED | OUTPATIENT
Start: 2024-09-10

## 2024-09-19 ENCOUNTER — TELEMEDICINE (OUTPATIENT)
Dept: PSYCHIATRY | Facility: CLINIC | Age: 29
End: 2024-09-19
Payer: COMMERCIAL

## 2024-09-19 DIAGNOSIS — F41.1 GENERALIZED ANXIETY DISORDER: Primary | ICD-10-CM

## 2024-09-19 DIAGNOSIS — F90.0 ADHD (ATTENTION DEFICIT HYPERACTIVITY DISORDER), INATTENTIVE TYPE: ICD-10-CM

## 2024-10-31 ENCOUNTER — TELEMEDICINE (OUTPATIENT)
Dept: PSYCHIATRY | Facility: CLINIC | Age: 29
End: 2024-10-31
Payer: COMMERCIAL

## 2024-10-31 DIAGNOSIS — F33.1 MODERATE EPISODE OF RECURRENT MAJOR DEPRESSIVE DISORDER: ICD-10-CM

## 2024-10-31 DIAGNOSIS — F41.1 GENERALIZED ANXIETY DISORDER: Primary | ICD-10-CM

## 2024-10-31 NOTE — PROGRESS NOTES
Date: October 31, 2024  Time In: 3:00 p.m.  Time Out: 4:00 p.m.     This provider is located at the [facility] ([facility]), [address], [place] [address] using a secure [employer] Video Visit through [employer]. Patient is being seen remotely via telehealth at home address in [place] and stated they are in a secure environment for this session. The patient's condition being diagnosed/treated is appropriate for telemedicine. The provider identified herself as well as her credentials. The patient, and/or patients guardian, consent to be seen remotely, and when consent is given they understand that the consent allows for patient identifiable information to be sent to a third party as needed. They may refuse to be seen remotely at any time. The electronic data is encrypted and password protected, and the patient and/or guardian has been advised of the potential risks to privacy not withstanding such measures.     You have chosen to receive care through a telehealth visit.  Do you consent to use a video/audio connection for your medical care today? Yes    PROGRESS NOTE  Data:  Humberto Gold is a 29 y.o. female who presents today for follow up    Chief Complaint: anxiety, depression    History of Present Illness: Patient discussed the  relationship building that her and her mother have been doing. Patient informed she feels her mother has taken steps toward being more open to their relationship.  Patient discussed the challenges she continues to see with rebuilding the relationship. Patient informed she would like to work on her father's relationship as well and would like to see her parents have a better relationship. Patient discussed how she felt for many years that she was in a triangulation with her parents to serve as . Patient discussed the support she receives from her fiance and friends.        Clinical Maneuvering/Intervention:    (Scales based on 0 -  10 with 10 being the worst)  Depression: 6 Anxiety: 7       Assisted patient in processing above session content; acknowledged and normalized patient’s thoughts, feelings, and concerns.  Rationalized patient thought process regarding the relationship with her parents.  Discussed triggers associated with patient's depression and anxiety.  Also discussed coping skills for patient to implement such as going for a walk, talking with friends, doing mindfulness activities.    Allowed patient to freely discuss issues without interruption or judgment. Provided safe, confidential environment to facilitate the development of positive therapeutic relationship and encourage open, honest communication. Assisted patient in identifying risk factors which would indicate the need for higher level of care including thoughts to harm self or others and/or self-harming behavior and encouraged patient to contact this office, call 911, or present to the nearest emergency room should any of these events occur. Discussed crisis intervention services and means to access. Patient adamantly and convincingly denies current suicidal or homicidal ideation or perceptual disturbance.    Assessment:   Assessment   Patient appears to maintain relative stability as compared to their baseline.  However, patient continues to struggle with depression and anxiety which continues to cause impairment in important areas of functioning.  A result, they can be reasonably expected to continue to benefit from treatment and would likely be at increased risk for decompensation otherwise.    Mental Status Exam:   Hygiene:   good  Cooperation:  Cooperative  Eye Contact:  Good  Psychomotor Behavior:  Appropriate  Affect:  Full range  Mood: depressed and anxious  Speech:  Normal  Thought Process:  Goal directed  Thought Content:  Mood congruent  Suicidal:  None  Homicidal:  None  Hallucinations:  None  Delusion:  None  Memory:  Intact  Orientation:  Person, Place,  Time, and Situation  Reliability:  good  Insight:  Good  Judgement:  Good  Impulse Control:  Good  Physical/Medical Issues:  No        Patient's Support Network Includes:  significant other, parents, and friends    Functional Status: Mild impairment     Progress toward goal: Not at goal    Prognosis: Good with Ongoing Treatment          Plan:    Patient will continue in individual outpatient therapy with focus on improved functioning and coping skills, maintaining stability, and avoiding decompensation and the need for higher level of care.    Patient will adhere to medication regimen as prescribed and report any side effects. Patient will contact this office, call 911 or present to the nearest emergency room should suicidal or homicidal ideations occur. Provide Cognitive Behavioral Therapy and Solution Focused Therapy to improve functioning, maintain stability, and avoid decompensation and the need for higher level of care.     Return in about 29 days (around 11/29/2024).            VISIT DIAGNOSIS:     ICD-10-CM ICD-9-CM   1. Generalized anxiety disorder  F41.1 300.02   2. Moderate episode of recurrent major depressive disorder  F33.1 296.32        Psychotherapy time 60 minutes. This time is exclusive to the therapy session and separate from the time spent on activities used to meet the criteria for the E/M service (history, exam, medical decision making).        This document has been electronically signed by Shannon Ballard LCSW  October 31, 2024 18:53 EDT     Part of this note may be an electronic transcription/translation of spoken language to printed text using the Dragon Dictation System.

## 2024-11-19 ENCOUNTER — OFFICE VISIT (OUTPATIENT)
Dept: FAMILY MEDICINE CLINIC | Facility: CLINIC | Age: 29
End: 2024-11-19
Payer: COMMERCIAL

## 2024-11-19 ENCOUNTER — LAB (OUTPATIENT)
Dept: LAB | Facility: HOSPITAL | Age: 29
End: 2024-11-19
Payer: COMMERCIAL

## 2024-11-19 VITALS
WEIGHT: 167.4 LBS | HEIGHT: 69 IN | OXYGEN SATURATION: 97 % | BODY MASS INDEX: 24.79 KG/M2 | DIASTOLIC BLOOD PRESSURE: 78 MMHG | TEMPERATURE: 98.9 F | HEART RATE: 94 BPM | SYSTOLIC BLOOD PRESSURE: 124 MMHG

## 2024-11-19 DIAGNOSIS — F90.9 ATTENTION DEFICIT HYPERACTIVITY DISORDER (ADHD), UNSPECIFIED ADHD TYPE: ICD-10-CM

## 2024-11-19 DIAGNOSIS — R79.89 ELEVATED LIVER FUNCTION TESTS: ICD-10-CM

## 2024-11-19 DIAGNOSIS — Z23 IMMUNIZATION DUE: Primary | ICD-10-CM

## 2024-11-19 DIAGNOSIS — F41.9 ANXIETY: ICD-10-CM

## 2024-11-19 LAB
ALBUMIN SERPL-MCNC: 4.6 G/DL (ref 3.5–5.2)
ALP SERPL-CCNC: 85 U/L (ref 39–117)
ALT SERPL W P-5'-P-CCNC: 11 U/L (ref 1–33)
AST SERPL-CCNC: 17 U/L (ref 1–32)
BILIRUB CONJ SERPL-MCNC: <0.2 MG/DL (ref 0–0.3)
BILIRUB INDIRECT SERPL-MCNC: NORMAL MG/DL
BILIRUB SERPL-MCNC: 0.3 MG/DL (ref 0–1.2)
PROT SERPL-MCNC: 7.4 G/DL (ref 6–8.5)

## 2024-11-19 PROCEDURE — 80076 HEPATIC FUNCTION PANEL: CPT | Performed by: STUDENT IN AN ORGANIZED HEALTH CARE EDUCATION/TRAINING PROGRAM

## 2024-11-19 PROCEDURE — 36415 COLL VENOUS BLD VENIPUNCTURE: CPT | Performed by: STUDENT IN AN ORGANIZED HEALTH CARE EDUCATION/TRAINING PROGRAM

## 2024-11-19 RX ORDER — ALBUTEROL SULFATE 0.83 MG/ML
2.5 SOLUTION RESPIRATORY (INHALATION) EVERY 6 HOURS PRN
COMMUNITY
Start: 2024-09-04

## 2024-11-19 NOTE — PROGRESS NOTES
"Chief Complaint  Anxiety (Wants to discuss getting on meds for anxiety and ADHD.)    Anxiety            She hasn't had any belly pain at all so she canceled colonoscopy and egd. She says her constipation resolved.     She wants to talk about getting back on her anxiety and adhd medications. She would like a new prescriber.         The following portions of the patient's history were reviewed and updated as appropriate: allergies, current medications, past family history, past medical history, past social history, past surgical history, and problem list.    OBJECTIVE:  /78   Pulse 94   Temp 98.9 °F (37.2 °C) (Infrared)   Ht 175.3 cm (69.02\")   Wt 75.9 kg (167 lb 6.4 oz)   SpO2 97%   BMI 24.71 kg/m²       Physical Exam  Constitutional:       General: She is not in acute distress.     Appearance: Normal appearance.   HENT:      Head: Normocephalic and atraumatic.   Eyes:      Extraocular Movements: Extraocular movements intact.   Skin:     Findings: No rash.   Neurological:      General: No focal deficit present.      Mental Status: She is alert.   Psychiatric:         Mood and Affect: Mood normal.                    Assessment and Plan   Diagnoses and all orders for this visit:    1. Immunization due (Primary)  -     Fluzone >6mos ([id number])    2. Anxiety  -     Ambulatory Referral to Psychiatry    3. Attention deficit hyperactivity disorder (ADHD), unspecified ADHD type  -     Ambulatory Referral to Psychiatry      She doesn't want to start on meds today but does want a new psychiatrist. Will refer.     No follow-ups on file.       Jadyn Dhillon D.O.  [facility]   "

## 2024-11-21 RX ORDER — BUDESONIDE AND FORMOTEROL FUMARATE DIHYDRATE 160; 4.5 UG/1; UG/1
2 AEROSOL RESPIRATORY (INHALATION) 2 TIMES DAILY
Qty: 10.3 G | Refills: 0 | Status: SHIPPED | OUTPATIENT
Start: 2024-11-21

## 2024-12-06 ENCOUNTER — TELEMEDICINE (OUTPATIENT)
Dept: PSYCHIATRY | Facility: CLINIC | Age: 29
End: 2024-12-06
Payer: COMMERCIAL

## 2024-12-06 DIAGNOSIS — F33.0 MILD EPISODE OF RECURRENT MAJOR DEPRESSIVE DISORDER: Primary | ICD-10-CM

## 2024-12-06 DIAGNOSIS — F41.1 GENERALIZED ANXIETY DISORDER: ICD-10-CM

## 2024-12-06 NOTE — PROGRESS NOTES
Mode of Visit: Video  Location of patient: -HOME-  Location of provider: +HOME+  You have chosen to receive care through a telehealth visit.  The patient has signed the video visit consent form.  The visit included audio and video interaction. No technical issues occurred during this visit.  Date: December 6, 2024  Time In: 9:03 a.m.   Time Out: 10:08 a.m.     This provider is located at the [facility]through [facility] [place] [address] using a secure [employer] Video Visit through [employer]. Patient is being seen remotely via telehealth at home address in [place] and stated they are in a secure environment for this session. The patient's condition being diagnosed/treated is appropriate for telemedicine. The provider identified herself as well as her credentials. The patient, and/or patients guardian, consent to be seen remotely, and when consent is given they understand that the consent allows for patient identifiable information to be sent to a third party as needed. They may refuse to be seen remotely at any time. The electronic data is encrypted and password protected, and the patient and/or guardian has been advised of the potential risks to privacy not withstanding such measures.     You have chosen to receive care through a telehealth visit.  Do you consent to use a video/audio connection for your medical care today? Yes    PROGRESS NOTE  Data:  Humberto Gold is a 29 y.o. female who presents today for follow up    Chief Complaint: depression and anxiety     History of Present Illness: Patient informed that she had a good Thanksgiving with her family and was able to spend time with her parents for the first time in a couple of years.  Patient discussed ways she continues to attempt to build back her relationship with her mother.  Patient is in the process of planning her wedding and is excited about the steps in the process. Therapist held space for  client to voice frustrations about not having her mother be as involved as she would like due to her mother's commitment to her work.  Patient voiced she has the support of friends and her fiance as well as limited family supports.        Clinical Maneuvering/Intervention:    (Scales based on 0 - 10 with 10 being the worst)  Depression: 5 Anxiety: 6       Assisted patient in processing above session content; acknowledged and normalized patient’s thoughts, feelings, and concerns.  Rationalized patient thought process regarding difficult relationship with her mother and how to navigate the relationship due to her mother showing signs of being a workaholic.  Discussed triggers associated with patient's depression and anxiety.  Also discussed coping skills for patient to implement such as spending time with friends, expressing thoughts to friends and fiance,sharing activities with her mother that can be enjoyed by her and her mother.    Allowed patient to freely discuss issues without interruption or judgment. Provided safe, confidential environment to facilitate the development of positive therapeutic relationship and encourage open, honest communication. Assisted patient in identifying risk factors which would indicate the need for higher level of care including thoughts to harm self or others and/or self-harming behavior and encouraged patient to contact this office, call 911, or present to the nearest emergency room should any of these events occur. Discussed crisis intervention services and means to access. Patient adamantly and convincingly denies current suicidal or homicidal ideation or perceptual disturbance.    Assessment:   Assessment   Patient appears to maintain relative stability as compared to their baseline.  However, patient continues to struggle with depression and anxiety which continues to cause impairment in important areas of functioning.  A result, they can be reasonably expected to continue to  benefit from treatment and would likely be at increased risk for decompensation otherwise.    Mental Status Exam:   Hygiene:   good  Cooperation:  Cooperative  Eye Contact:  Good  Psychomotor Behavior:  Appropriate  Affect:  Full range  Mood: depressed and anxious  Speech:  Normal  Thought Process:  Goal directed  Thought Content:  Mood congruent  Suicidal:  None  Homicidal:  None  Hallucinations:  None  Delusion:  None  Memory:  Intact  Orientation:  Person, Place, Time, and Situation  Reliability:  good  Insight:  Good  Judgement:  Good  Impulse Control:  Good  Physical/Medical Issues:  No        Patient's Support Network Includes:  significant other, parents, extended family, and friends    Functional Status: Mild impairment     Progress toward goal: Not at goal    Prognosis: Good with Ongoing Treatment          Plan:    Patient will continue in individual outpatient therapy with focus on improved functioning and coping skills, maintaining stability, and avoiding decompensation and the need for higher level of care.    Patient will adhere to medication regimen as prescribed and report any side effects. Patient will contact this office, call 911 or present to the nearest emergency room should suicidal or homicidal ideations occur. Provide Cognitive Behavioral Therapy and Solution Focused Therapy to improve functioning, maintain stability, and avoid decompensation and the need for higher level of care.     Return in about 3 weeks (around 12/27/2024).            VISIT DIAGNOSIS:     ICD-10-CM ICD-9-CM   1. Mild episode of recurrent major depressive disorder  F33.0 296.31   2. Generalized anxiety disorder  F41.1 300.02        Psychotherapy time 65 minutes. This time is exclusive to the therapy session and separate from the time spent on activities used to meet the criteria for the E/M service (history, exam, medical decision making).        This document has been electronically signed by Shannon Ballard  [facility]  December 6, 2024 10:37 EST     Part of this note may be an electronic transcription/translation of spoken language to printed text using the Dragon Dictation System.

## 2024-12-18 ENCOUNTER — OFFICE VISIT (OUTPATIENT)
Age: 29
End: 2024-12-18
Payer: COMMERCIAL

## 2024-12-18 VITALS
WEIGHT: 168 LBS | BODY MASS INDEX: 24.8 KG/M2 | SYSTOLIC BLOOD PRESSURE: 112 MMHG | OXYGEN SATURATION: 98 % | HEART RATE: 99 BPM | DIASTOLIC BLOOD PRESSURE: 72 MMHG

## 2024-12-18 DIAGNOSIS — F90.0 ATTENTION DEFICIT HYPERACTIVITY DISORDER (ADHD), INATTENTIVE TYPE, MODERATE: Primary | ICD-10-CM

## 2024-12-18 DIAGNOSIS — Z51.81 THERAPEUTIC DRUG MONITORING: ICD-10-CM

## 2024-12-18 DIAGNOSIS — F41.1 GENERALIZED ANXIETY DISORDER: ICD-10-CM

## 2024-12-18 RX ORDER — PAROXETINE 20 MG/1
20 TABLET, FILM COATED ORAL DAILY
Qty: 30 TABLET | Refills: 2 | Status: SHIPPED | OUTPATIENT
Start: 2024-12-18 | End: 2025-12-18

## 2024-12-18 RX ORDER — DEXTROAMPHETAMINE SACCHARATE, AMPHETAMINE ASPARTATE MONOHYDRATE, DEXTROAMPHETAMINE SULFATE AND AMPHETAMINE SULFATE 5; 5; 5; 5 MG/1; MG/1; MG/1; MG/1
20 CAPSULE, EXTENDED RELEASE ORAL DAILY
Qty: 30 CAPSULE | Refills: 0 | Status: SHIPPED | OUTPATIENT
Start: 2024-12-18 | End: 2025-12-18
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
## 2024-12-23 RX ORDER — BUDESONIDE AND FORMOTEROL FUMARATE 160; 4.5 UG/1; UG/1
2 AEROSOL, METERED RESPIRATORY (INHALATION) 2 TIMES DAILY
Qty: 10.3 G | Refills: 0 | Status: SHIPPED | OUTPATIENT
Start: 2024-12-23

## 2024-12-27 ENCOUNTER — TELEMEDICINE (OUTPATIENT)
Dept: PSYCHIATRY | Facility: CLINIC | Age: 29
End: 2024-12-27
Payer: COMMERCIAL

## 2024-12-27 DIAGNOSIS — F41.1 GENERALIZED ANXIETY DISORDER: ICD-10-CM

## 2024-12-27 DIAGNOSIS — F90.0 ADHD (ATTENTION DEFICIT HYPERACTIVITY DISORDER), INATTENTIVE TYPE: Primary | ICD-10-CM

## 2024-12-27 LAB
1OH-MIDAZOLAM UR QL SCN: NOT DETECTED NG/MG CREAT
6MAM UR QL SCN: NEGATIVE NG/ML
7AMINOCLONAZEPAM/CREAT UR: NOT DETECTED NG/MG CREAT
A-OH ALPRAZ/CREAT UR: NOT DETECTED NG/MG CREAT
A-OH-TRIAZOLAM/CREAT UR CFM: NOT DETECTED NG/MG CREAT
ACP UR QL CFM: NOT DETECTED
ALPRAZ/CREAT UR CFM: NOT DETECTED NG/MG CREAT
AMPHETAMINES UR QL SCN: NEGATIVE NG/ML
APAP UR QL SCN: NEGATIVE UG/ML
BARBITURATES UR QL SCN: NEGATIVE NG/ML
BENZODIAZ SCN METH UR: NEGATIVE
BUPRENORPHINE UR QL SCN: NEGATIVE
BUPRENORPHINE/CREAT UR: NOT DETECTED NG/MG CREAT
CANNABINOIDS UR QL CFM: NORMAL
CANNABINOIDS UR QL SCN: NORMAL NG/ML
CARBOXYTHC UR CFM-MCNC: 225 NG/MG CREAT
CARISOPRODOL UR QL: NEGATIVE NG/ML
CLONAZEPAM/CREAT UR CFM: NOT DETECTED NG/MG CREAT
COCAINE+BZE UR QL SCN: NEGATIVE NG/ML
CREAT UR-MCNC: 24 MG/DL
D-METHORPHAN UR-MCNC: NOT DETECTED NG/ML
D-METHORPHAN+LEVORPHANOL UR QL: NOT DETECTED
DESALKYLFLURAZ/CREAT UR: NOT DETECTED NG/MG CREAT
DIAZEPAM/CREAT UR: NOT DETECTED NG/MG CREAT
ETHANOL UR QL SCN: NEGATIVE G/DL
ETHANOL UR QL SCN: NEGATIVE NG/ML
FENTANYL CTO UR SCN-MCNC: NEGATIVE NG/ML
FENTANYL/CREAT UR: NOT DETECTED NG/MG CREAT
FLUNITRAZEPAM UR QL SCN: NOT DETECTED NG/MG CREAT
GABAPENTIN UR-MCNC: NEGATIVE UG/ML
HALLUCINOGENS UR: NEGATIVE
HYPNOTICS UR QL SCN: NEGATIVE
KETAMINE UR QL: NOT DETECTED
LORAZEPAM/CREAT UR: NOT DETECTED NG/MG CREAT
MEPERIDINE UR QL SCN: NEGATIVE NG/ML
METHADONE UR QL SCN: NEGATIVE NG/ML
METHADONE+METAB UR QL SCN: NEGATIVE NG/ML
MIDAZOLAM/CREAT UR CFM: NOT DETECTED NG/MG CREAT
MISCELLANEOUS, UR: NEGATIVE
NORBUPRENORPHINE/CREAT UR: NOT DETECTED NG/MG CREAT
NORDIAZEPAM/CREAT UR: NOT DETECTED NG/MG CREAT
NORFENTANYL/CREAT UR: NOT DETECTED NG/MG CREAT
NORFLUNITRAZEPAM UR-MCNC: NOT DETECTED NG/MG CREAT
NORKETAMINE UR-MCNC: NOT DETECTED UG/ML
OPIATES UR SCN-MCNC: NEGATIVE NG/ML
OXAZEPAM/CREAT UR: NOT DETECTED NG/MG CREAT
OXYCODONE CTO UR SCN-MCNC: NEGATIVE NG/ML
PCP UR QL SCN: NEGATIVE NG/ML
PRESCRIBED MEDICATIONS: NORMAL
PROPOXYPH UR QL SCN: NEGATIVE NG/ML
TAPENTADOL CTO UR SCN-MCNC: NEGATIVE NG/ML
TEMAZEPAM/CREAT UR: NOT DETECTED NG/MG CREAT
TRAMADOL UR QL SCN: NEGATIVE NG/ML
ZALEPLON UR-MCNC: NOT DETECTED NG/ML
ZOLPIDEM PHENYL-4-CARB UR QL SCN: NOT DETECTED
ZOLPIDEM UR QL SCN: NOT DETECTED
ZOPICLONE-N-OXIDE UR-MCNC: NOT DETECTED NG/ML

## 2024-12-27 NOTE — PROGRESS NOTES
Mode of Visit: Video  Location of patient: -HOME-  Location of provider: +HOME+  You have chosen to receive care through a telehealth visit.  The patient has signed the video visit consent form.  The visit included audio and video interaction. No technical issues occurred during this visit.    Date: December 27, 2024  Time In: 11:07 a.m.  Time Out: 12:14 p.m.     This provider is located at [facility] [address] [place] [address] using a secure [employer] Video Visit through [employer]. Patient is being seen remotely via telehealth at [address] [place] [address] in [place] and stated they are in a secure environment for this session. The patient's condition being diagnosed/treated is appropriate for telemedicine. The provider identified herself as well as her credentials. The patient, and/or patients guardian, consent to be seen remotely, and when consent is given they understand that the consent allows for patient identifiable information to be sent to a third party as needed. They may refuse to be seen remotely at any time. The electronic data is encrypted and password protected, and the patient and/or guardian has been advised of the potential risks to privacy not withstanding such measures.     You have chosen to receive care through a telehealth visit.  Do you consent to use a video/audio connection for your medical care today? Yes    PROGRESS NOTE  Data:  Humberto Gold is a 29 y.o. female who presents today for follow up    Chief Complaint: anxiety    History of Present Illness: Patient informed she has recently been working to rebuild her relationship with her mother and this has been going well.  Patient recognizes that she needs to have flexible thinking and be emotionally mature when relating to her mother.  Patient voiced concern with family concerns of her grandparents due to their aging and her part in these  changes.  Patient voiced she has recently begun medication for anxiety and ADHD medications.  Patient voiced she has been stressed over her upcoming wedding but feels she is making good decisions about the event.  Patient voiced she is supported by her extended family, parents, fiance, and friends.       Clinical Maneuvering/Intervention:    (Scales based on 0 - 10 with 10 being the worst)  Depression: 4 Anxiety: 3       Assisted patient in processing above session content; acknowledged and normalized patient’s thoughts, feelings, and concerns.  Rationalized patient thought process regarding family relationships and her upcoming wedding.  Discussed triggers associated with patient's ADHD and anxiety.  Also discussed coping skills for patient to implement such as spending time with those she is most comfortable, allowing herself to feel confident in her decisions, and self-care.    Allowed patient to freely discuss issues without interruption or judgment. Provided safe, confidential environment to facilitate the development of positive therapeutic relationship and encourage open, honest communication. Assisted patient in identifying risk factors which would indicate the need for higher level of care including thoughts to harm self or others and/or self-harming behavior and encouraged patient to contact this office, call 911, or present to the nearest emergency room should any of these events occur. Discussed crisis intervention services and means to access. Patient adamantly and convincingly denies current suicidal or homicidal ideation or perceptual disturbance.    Assessment:   Assessment   Patient appears to maintain relative stability as compared to their baseline.  However, patient continues to struggle with ADHD symptoms and anxiety which continues to cause impairment in important areas of functioning.  A result, they can be reasonably expected to continue to benefit from treatment and would likely be at  increased risk for decompensation otherwise.    Mental Status Exam:   Hygiene:   good  Cooperation:  Cooperative  Eye Contact:  Good  Psychomotor Behavior:  Appropriate  Affect:  Full range  Mood: anxious  Speech:  Normal  Thought Process:  Goal directed  Thought Content:  Mood congruent  Suicidal:  None  Homicidal:  None  Hallucinations:  None  Delusion:  None  Memory:  Intact  Orientation:  Person, Place, Time, and Situation  Reliability:  good  Insight:  Fair  Judgement:  Good  Impulse Control:  Good  Physical/Medical Issues:  No        Patient's Support Network Includes:  significant other, parents, extended family, and friends    Functional Status: Mild impairment     Progress toward goal: Not at goal    Prognosis: Good with Ongoing Treatment          Plan:    Patient will continue in individual outpatient therapy with focus on improved functioning and coping skills, maintaining stability, and avoiding decompensation and the need for higher level of care.    Patient will adhere to medication regimen as prescribed and report any side effects. Patient will contact this office, call 911 or present to the nearest emergency room should suicidal or homicidal ideations occur. Provide Cognitive Behavioral Therapy and Solution Focused Therapy to improve functioning, maintain stability, and avoid decompensation and the need for higher level of care.     Return in about 6 days (around 1/2/2025).            VISIT DIAGNOSIS:     ICD-10-CM ICD-9-CM   1. ADHD (attention deficit hyperactivity disorder), inattentive type  F90.0 314.00   2. Generalized anxiety disorder  F41.1 300.02        Psychotherapy time 70 minutes. This time is exclusive to the therapy session and separate from the time spent on activities used to meet the criteria for the E/M service (history, exam, medical decision making).        This document has been electronically signed by Shannon Ballard LCSW  December 27, 2024 12:32 EST     Part of this note may  be an electronic transcription/translation of spoken language to printed text using the Dragon Dictation System.

## 2025-01-03 ENCOUNTER — TELEMEDICINE (OUTPATIENT)
Dept: PSYCHIATRY | Facility: CLINIC | Age: 30
End: 2025-01-03
Payer: COMMERCIAL

## 2025-01-03 DIAGNOSIS — F41.1 GENERALIZED ANXIETY DISORDER: Primary | ICD-10-CM

## 2025-01-03 NOTE — TREATMENT PLAN
Multi-Disciplinary Problems (from Behavioral Health Treatment Plan)      Active Problems       Problem: Anxiety  Start Date: 01/03/25      Problem Details: The patient self-scales this problem as a 5 with 10 being the worst.          Goal Priority Start Date Expected End Date End Date    Patient will develop and implement behavioral and cognitive strategies to reduce anxiety and irrational fears. Medium 01/03/25 07/04/25 --    Goal Details: Progress toward goal:  The patient self-scales their progress related to this goal as a 5 with 10 being the worst.          Goal Intervention Frequency Start Date End Date    Help patient explore past emotional issues in relation to present anxiety. Weekly 01/03/25 --    Intervention Details: Duration of treatment until discharged.          Goal Intervention Frequency Start Date End Date    Help patient develop an awareness of their cognitive and physical responses to anxiety. Weekly 01/03/25 --    Intervention Details: Duration of treatment until discharged.                  Problem: Depression  Start Date: 01/03/25      Problem Details: The patient self-scales this problem as a 4 with 10 being the worst.          Goal Priority Start Date Expected End Date End Date    Patient will demonstrate the ability to initiate new constructive life skills outside of sessions on a consistent basis. Low 01/03/25 07/04/25 --    Goal Details: Progress toward goal:  The patient self-scales their progress related to this goal as a 4 with 10 being the worst.          Goal Intervention Frequency Start Date End Date    Assist patient in setting attainable activities of daily living goals. Q Month 01/03/25 --      Goal Intervention Frequency Start Date End Date    Provide education about depression Q Month 01/03/25 --    Intervention Details: Duration of treatment until discharged.          Goal Intervention Frequency Start Date End Date    Assist patient in developing healthy coping strategies. Q  Month 01/03/25 --    Intervention Details: Duration of treatment until discharged.                  Problem: ADHD (Adult)  Start Date: 01/03/25      Problem Details: The patient self-scales this problem as a 5 with 10 being the worst.        Goal Priority Start Date Expected End Date End Date    Patient will sustain attention and concentration to complete chores, and work responsibilites and increase positive interaction in all relationships. Medium 01/03/25 07/04/25 --    Goal Details: Progress toward goal:  The patient self-scales their progress related to this goal as a 5 with 10 being the worst.        Goal Intervention Frequency Start Date End Date    Assist patient in setting responsible goals and breaking down large tasks. Q Month 01/03/25 --    Intervention Details: Duration of treatment until discharged.        Goal Intervention Frequency Start Date End Date    Assist patient in using self monitoring checklist to improve attention, work performance, and social skills. Q Month 01/03/25 --    Intervention Details: Duration of treatment until discharged.                               I have discussed and reviewed this treatment plan with the patient.  It has been printed for signatures.

## 2025-01-03 NOTE — PROGRESS NOTES
Mode of Visit: Video  Location of patient: -HOME-  Location of provider: +HOME+  You have chosen to receive care through a telehealth visit.  The patient has signed the video visit consent form.  The visit included audio and video interaction. No technical issues occurred during this visit.    Date: January 3, 2025  Time In: 8:00 a.m.  Time Out: 9:03 a.m.     This provider is located at the [facility]), [address] [place] [address] using a secure [employer] Video Visit through [employer]. Patient is being seen remotely via telehealth at [address] [place] [address] in [place] and stated they are in a secure environment for this session. The patient's condition being diagnosed/treated is appropriate for telemedicine. The provider identified herself as well as her credentials. The patient, and/or patients guardian, consent to be seen remotely, and when consent is given they understand that the consent allows for patient identifiable information to be sent to a third party as needed. They may refuse to be seen remotely at any time. The electronic data is encrypted and password protected, and the patient and/or guardian has been advised of the potential risks to privacy not withstanding such measures.     You have chosen to receive care through a telehealth visit.  Do you consent to use a video/audio connection for your medical care today? Yes    PROGRESS NOTE  Data:  Humberto Gold is a 29 y.o. female who presents today for follow up    Chief Complaint: anxiety    History of Present Illness: Patient voiced she has been doing well and likes her job she is currently doing.  Patient continues to work on her wedding plans and has decided [place] her parent's [place].  Patient voiced this continues to make her nervous due to the history with her mother.  Patient continues to balance family relationships and realizes she wants everyone to  feel comfortable in the spaces together.  Patient voiced she has the support of her family now like she has not had in several years, her paramour, and friends.       Clinical Maneuvering/Intervention:    (Scales based on 0 - 10 with 10 being the worst)  Depression: 4 Anxiety: 5       Assisted patient in processing above session content; acknowledged and normalized patient’s thoughts, feelings, and concerns.  Rationalized patient thought process regarding balancing relationships with family members.  Discussed triggers associated with patient's anxiety.  Also discussed coping skills for patient to implement such as continuing with flexible thinking, spending time with loved ones, and participating in self-care activities.    Allowed patient to freely discuss issues without interruption or judgment. Provided safe, confidential environment to facilitate the development of positive therapeutic relationship and encourage open, honest communication. Assisted patient in identifying risk factors which would indicate the need for higher level of care including thoughts to harm self or others and/or self-harming behavior and encouraged patient to contact this office, call 911, or present to the nearest emergency room should any of these events occur. Discussed crisis intervention services and means to access. Patient adamantly and convincingly denies current suicidal or homicidal ideation or perceptual disturbance.    Assessment:   Assessment   Patient appears to maintain relative stability as compared to their baseline.  However, patient continues to struggle with anxiety which continues to cause impairment in important areas of functioning.  A result, they can be reasonably expected to continue to benefit from treatment and would likely be at increased risk for decompensation otherwise.    Mental Status Exam:   Hygiene:   good  Cooperation:  Cooperative  Eye Contact:  Good  Psychomotor Behavior:  Appropriate  Affect:  Full  range  Mood: anxious  Speech:  Normal  Thought Process:  Goal directed  Thought Content:  Mood congruent  Suicidal:  None  Homicidal:  None  Hallucinations:  None  Delusion:  None  Memory:  Intact  Orientation:  Person, Place, Time, and Situation  Reliability:  good  Insight:  Fair  Judgement:  Good  Impulse Control:  Good  Physical/Medical Issues:  No        Patient's Support Network Includes:  significant other, parents, extended family, and friends    Functional Status: Mild impairment     Progress toward goal: Not at goal    Prognosis: Good with Ongoing Treatment          Plan:    Patient will continue in individual outpatient therapy with focus on improved functioning and coping skills, maintaining stability, and avoiding decompensation and the need for higher level of care.    Patient will adhere to medication regimen as prescribed and report any side effects. Patient will contact this office, call 911 or present to the nearest emergency room should suicidal or homicidal ideations occur. Provide Cognitive Behavioral Therapy and Solution Focused Therapy to improve functioning, maintain stability, and avoid decompensation and the need for higher level of care.     Return in about 4 weeks (around 1/31/2025).            VISIT DIAGNOSIS:     ICD-10-CM ICD-9-CM   1. Generalized anxiety disorder  F41.1 300.02        Psychotherapy time 60 minutes. This time is exclusive to the therapy session and separate from the time spent on activities used to meet the criteria for the E/M service (history, exam, medical decision making).        This document has been electronically signed by Shannon Ballard LCSW  January 3, 2025 09:30 EST     Part of this note may be an electronic transcription/translation of spoken language to printed text using the Dragon Dictation System.

## 2025-01-16 DIAGNOSIS — F90.0 ATTENTION DEFICIT HYPERACTIVITY DISORDER (ADHD), INATTENTIVE TYPE, MODERATE: ICD-10-CM

## 2025-01-16 RX ORDER — DEXTROAMPHETAMINE SACCHARATE, AMPHETAMINE ASPARTATE MONOHYDRATE, DEXTROAMPHETAMINE SULFATE AND AMPHETAMINE SULFATE 5; 5; 5; 5 MG/1; MG/1; MG/1; MG/1
20 CAPSULE, EXTENDED RELEASE ORAL DAILY
Qty: 30 CAPSULE | Refills: 0 | Status: SHIPPED | OUTPATIENT
Start: 2025-01-16 | End: 2026-01-16

## 2025-01-16 NOTE — TELEPHONE ENCOUNTER
Rx Refill Note  Requested Prescriptions     Pending Prescriptions Disp Refills    amphetamine-dextroamphetamine XR (ADDERALL XR) 20 MG 24 hr capsule 30 capsule 0     Sig: Take 1 capsule by mouth Daily      Last office visit with prescribing clinician: 12/18/2024   Last telemedicine visit with prescribing clinician: Visit date not found   Next office visit with prescribing clinician: 1/29/2025                         Would you like a call back once the refill request has been completed: [] Yes [] No    If the office needs to give you a call back, can they leave a voicemail: [] Yes [] No    Maeve Hoyt LPN  01/16/25, 13:03 EST

## 2025-01-17 ENCOUNTER — TELEMEDICINE (OUTPATIENT)
Dept: PSYCHIATRY | Facility: CLINIC | Age: 30
End: 2025-01-17
Payer: COMMERCIAL

## 2025-01-17 DIAGNOSIS — F90.0 ADHD (ATTENTION DEFICIT HYPERACTIVITY DISORDER), INATTENTIVE TYPE: Primary | ICD-10-CM

## 2025-01-17 DIAGNOSIS — F41.1 GENERALIZED ANXIETY DISORDER: ICD-10-CM

## 2025-01-17 NOTE — PROGRESS NOTES
Mode of Visit: Video  Location of patient: -HOME-  Location of provider: +HOME+  You have chosen to receive care through a telehealth visit.  The patient has signed the video visit consent form.  The visit included audio and video interaction. No technical issues occurred during this visit.    Date: January 17, 2025  Time In: 8:00   Time Out: 9:03    This provider is located at the [facility] (through [facility]), [address] [facility], [place] [address] using a secure [employer] Video Visit through [employer]. Patient is being seen remotely via telehealth at [address] [place] [address] in [place] and stated they are in a secure environment for this session. The patient's condition being diagnosed/treated is appropriate for telemedicine. The provider identified herself as well as her credentials. The patient, and/or patients guardian, consent to be seen remotely, and when consent is given they understand that the consent allows for patient identifiable information to be sent to a third party as needed. They may refuse to be seen remotely at any time. The electronic data is encrypted and password protected, and the patient and/or guardian has been advised of the potential risks to privacy not withstanding such measures.     You have chosen to receive care through a telehealth visit.  Do you consent to use a video/audio connection for your medical care today? Yes    PROGRESS NOTE  Data:  Humberto Gold is a 29 y.o. female who presents today for follow up    Chief Complaint: anxiety, depression     History of Present Illness: Patient voiced she has recently spent quality time with her paramour and with friends. Patient voiced she has made a connection with someone who has helped her discover a new hobby.  Patient voiced she has recently had difficulty with passing a test required for work. Patient voiced it causes her to be anxious and finds it to be difficult even  though she is really good at her job. Patient voiced she has family supports, supports from friends, and from her paramour.        Clinical Maneuvering/Intervention:    (Scales based on 0 - 10 with 10 being the worst)  Depression: 3 Anxiety: 5       Assisted patient in processing above session content; acknowledged and normalized patient’s thoughts, feelings, and concerns.  Rationalized patient thought process regarding anxiety around the test, and finding ways to relax.  Discussed triggers associated with patient's anxiety and depression.  Also discussed coping skills for patient to implement such as using test taking strategies, attending self-defense class, and hiking with paramour.    Allowed patient to freely discuss issues without interruption or judgment. Provided safe, confidential environment to facilitate the development of positive therapeutic relationship and encourage open, honest communication. Assisted patient in identifying risk factors which would indicate the need for higher level of care including thoughts to harm self or others and/or self-harming behavior and encouraged patient to contact this office, call 911, or present to the nearest emergency room should any of these events occur. Discussed crisis intervention services and means to access. Patient adamantly and convincingly denies current suicidal or homicidal ideation or perceptual disturbance.    Assessment:   Assessment   Patient appears to maintain relative stability as compared to their baseline.  However, patient continues to struggle with anxiety and depression which continues to cause impairment in important areas of functioning.  A result, they can be reasonably expected to continue to benefit from treatment and would likely be at increased risk for decompensation otherwise.    Mental Status Exam:   Hygiene:   good  Cooperation:  Cooperative  Eye Contact:  Good  Psychomotor Behavior:  Appropriate  Affect:  Appropriate  Mood:  anxious  Speech:  Normal  Thought Process:  Goal directed  Thought Content:  Mood congruent  Suicidal:  None  Homicidal:  None  Hallucinations:  None  Delusion:  None  Memory:  Intact  Orientation:  Person, Place, Time, and Situation  Reliability:  good  Insight:  Good  Judgement:  Good  Impulse Control:  Fair  Physical/Medical Issues:  No        Patient's Support Network Includes:  significant other, extended family, parents, and friends    Functional Status: Mild impairment     Progress toward goal: Not at goal    Prognosis: Good with Ongoing Treatment          Plan:    Patient will continue in individual outpatient therapy with focus on improved functioning and coping skills, maintaining stability, and avoiding decompensation and the need for higher level of care.    Patient will adhere to medication regimen as prescribed and report any side effects. Patient will contact this office, call 911 or present to the nearest emergency room should suicidal or homicidal ideations occur. Provide Cognitive Behavioral Therapy and Solution Focused Therapy to improve functioning, maintain stability, and avoid decompensation and the need for higher level of care.     Return in about 2 weeks (around 1/31/2025).            VISIT DIAGNOSIS:     ICD-10-CM ICD-9-CM   1. ADHD (attention deficit hyperactivity disorder), inattentive type  F90.0 314.00   2. Generalized anxiety disorder  F41.1 300.02        Psychotherapy time 60 minutes. This time is exclusive to the therapy session and separate from the time spent on activities used to meet the criteria for the E/M service (history, exam, medical decision making).        This document has been electronically signed by Shannon Ballard LCSW  January 17, 2025 12:59 EST     Part of this note may be an electronic transcription/translation of spoken language to printed text using the Dragon Dictation System.

## 2025-01-24 RX ORDER — BUDESONIDE AND FORMOTEROL FUMARATE 160; 4.5 UG/1; UG/1
2 AEROSOL, METERED RESPIRATORY (INHALATION) 2 TIMES DAILY
Qty: 10.3 G | Refills: 0 | Status: SHIPPED | OUTPATIENT
Start: 2025-01-24

## 2025-01-29 ENCOUNTER — OFFICE VISIT (OUTPATIENT)
Age: 30
End: 2025-01-29
Payer: COMMERCIAL

## 2025-01-29 VITALS
DIASTOLIC BLOOD PRESSURE: 80 MMHG | SYSTOLIC BLOOD PRESSURE: 120 MMHG | OXYGEN SATURATION: 98 % | HEIGHT: 69 IN | WEIGHT: 163 LBS | BODY MASS INDEX: 24.14 KG/M2 | HEART RATE: 100 BPM

## 2025-01-29 DIAGNOSIS — Z13.9 SCREENING DUE: ICD-10-CM

## 2025-01-29 DIAGNOSIS — F90.0 ATTENTION DEFICIT HYPERACTIVITY DISORDER (ADHD), INATTENTIVE TYPE, MODERATE: Primary | ICD-10-CM

## 2025-01-29 DIAGNOSIS — F41.1 GENERALIZED ANXIETY DISORDER: ICD-10-CM

## 2025-01-29 NOTE — PROGRESS NOTES
Follow Up Office Visit      Patient Name: Humberto Gold  : 1995   MRN: [id number]     Referring Provider: Jadyn Dhillon DO    Chief Complaint:      ICD-10-CM ICD-9-CM   1. Attention deficit hyperactivity disorder (ADHD), inattentive type, moderate  F90.0 314.01   2. Generalized anxiety disorder  F41.1 300.02   3. Screening due  Z13.9 V82.9        History of Present Illness:   Humberto Gold is a 29 y.o. female who is here today for follow up and medication management.    Subjective      Patient Reports:   History of Present Illness  She reports a positive response to her current regimen of Adderall and Paxil, with noticeable improvements in her mood and anxiety. She no longer experiences facial flushing during social interactions, a symptom she previously associated with her anxiety.  She reports her fiancé commented on her improved moods and that she seems happier.  Patient reports her social anxiety is improving.  She has been working remotely since 2023, which has limited her social interaction. Her sleep pattern is consistent, averaging 7 to 8 hours per night. She reports a decrease in appetite, which she attributes to a reduction in emotional stress eating, and notes a slight weight loss. She does not endorse any suicidal ideation or self-harm tendencies. She denies hallucinations. She also does not report experiencing panic attacks, mood swings, or anger outbursts. She quantifies her anxiety level as a 6 on a scale of 1 to 10, and her depressive symptoms as a 3 on the same scale. She does not endorse feelings of helplessness or loneliness. She reports no changes in her physical health.    MEDICATIONS  Adderall, Paxil    PHQ-9= 10 decreased score from previous visit  ZULEIKA-7= 11 decreased score from previous visit    Review of Systems:   Review of Systems   Constitutional:  Negative for appetite change, fatigue and unexpected weight change.   Eyes:  Negative for visual disturbance.    Respiratory:  Negative for chest tightness and shortness of breath.    Cardiovascular:  Negative for chest pain and palpitations.   Gastrointestinal:  Negative for nausea.   Musculoskeletal:  Negative for gait problem.   Skin:  Negative for rash and wound.   Neurological:  Negative for dizziness, tremors, seizures, weakness, light-headedness and headaches.   Psychiatric/Behavioral:  Negative for agitation, behavioral problems, confusion, decreased concentration, dysphoric mood, hallucinations, self-injury, sleep disturbance and suicidal ideas. The patient is nervous/anxious. The patient is not hyperactive.    Sleep pattern: 7-8 hours per night   Appetite: normal     PHQ-9 Depression Screening  Little interest or pleasure in doing things? Several days   Feeling down, depressed, or hopeless? Several days   PHQ-2 Total Score 2   Trouble falling or staying asleep, or sleeping too much? Over half   Feeling tired or having little energy? Several days   Poor appetite or overeating? Several days   Feeling bad about yourself - or that you are a failure or have let yourself or your family down? Several days   Trouble concentrating on things, such as reading the newspaper or watching television? Over half   Moving or speaking so slowly that other people could have noticed? Or the opposite - being so fidgety or restless that you have been moving around a lot more than usual? Several days   Thoughts that you would be better off dead, or of hurting yourself in some way? Not at all   PHQ-9 Total Score 10   If you checked off any problems, how difficult have these problems made it for you to do your work, take care of things at home, or get along with other people? Somewhat difficult       ZULEIKA-7 Anxiety Screening  Over the last two weeks, how often have you been bothered by the following problems?  Feeling nervous, anxious or on edge: More than half the days  Not being able to stop or control worrying: More than half the  "days  Worrying too much about different things: More than half the days  Trouble Relaxing: More than half the days  Being so restless that it is hard to sit still: Several days  Becoming easily annoyed or irritable: Several days  Feeling afraid as if something awful might happen: Several days  ZULEIKA 7 Total Score: 11  If you checked any problems, how difficult have these problems made it for you to do your work, take care of things at home, or get along with other people: Somewhat difficult    RISK ASSESSMENT:  Patient denies any thoughts or intent of suicide today. Patient denies any impulsive behavior today.     Medications:     Current Outpatient Medications:     albuterol (PROVENTIL) (2.5 MG/3ML) 0.083% nebulizer solution, Take 2.5 mg by nebulization Every 6 (Six) Hours As Needed for Shortness of Air., Disp: , Rfl:     albuterol sulfate  (90 Base) MCG/ACT inhaler, Inhale 2 puffs Every 6 (Six) Hours As Needed for Wheezing or Shortness of Air., Disp: 8 g, Rfl: 0    amphetamine-dextroamphetamine XR (ADDERALL XR) 20 MG 24 hr capsule, Take 1 capsule by mouth Daily, Disp: 30 capsule, Rfl: 0    Breyna 160-4.5 MCG/ACT inhaler, INHALE 2 PUFFS BY MOUTH TWICE DAILY, Disp: 10.3 g, Rfl: 0    Levocetirizine Dihydrochloride (XYZAL ALLERGY 24HR PO), , Disp: , Rfl:     PARoxetine (PAXIL) 20 MG tablet, Take 1 tablet by mouth Daily., Disp: 30 tablet, Rfl: 2    Medication Considerations:  JUDIE reviewed and appropriate.      Allergies:   Allergies   Allergen Reactions    Peanuts [Peanut Oil] Anaphylaxis    Penicillins Anaphylaxis       Results Reviewed:   Yes     Objective     Physical Exam:  Vital Signs:   Vitals:    01/29/25 0819   BP: 120/80   Pulse: 100   SpO2: 98%   Weight: 73.9 kg (163 lb)   Height: 175.3 cm (69.02\")     Body mass index is 24.06 kg/m².     Mental Status Exam:   MENTAL STATUS EXAM   General Appearance:  Cleanly groomed and dressed and well developed  Eye Contact:  Good eye contact  Attitude:  " Cooperative  Motor Activity:  Normal gait, posture  Muscle Strength:  Normal  Speech:  Normal rate, tone, volume  Language:  Spontaneous  Mood and affect:  Normal, pleasant  Hopelessness:  Denies  Loneliness: Denies  Thought Process:  Logical  Associations/ Thought Content:  No delusions  Hallucinations:  None  Suicidal Ideations:  Not present  Homicidal Ideation:  Not present  Sensorium:  Alert and clear  Orientation:  Person, place, time and situation  Immediate Recall, Recent, and Remote Memory:  Intact  Attention Span/ Concentration:  Good  Fund of Knowledge:  Appropriate for age and educational level  Intellectual Functioning:  Average range  Insight:  Good  Judgement:  Good  Reliability:  Good  Impulse Control:  Fair       @RESULASTCBCDIFFPANEL,TSH,LABLIPI,YBBIOKHE41,SUXPSNMZ14,MG,FOLATE,PROLACTIN,CRPRESULT,CMP,L7OFIQPYRYB)@    Lab Results   Component Value Date    GLUCOSE 78 02/15/2024    BUN 6 02/15/2024    CREATININE 0.73 02/15/2024    EGFR 115.0 02/15/2024    BCR 8.2 02/15/2024    K 4.0 02/15/2024    CO2 22.9 02/15/2024    CALCIUM 9.6 02/15/2024    PROTENTOTREF 6.8 05/09/2017    ALBUMIN 4.6 11/19/2024    BILITOT 0.3 11/19/2024    AST 17 11/19/2024    ALT 11 11/19/2024       Lab Results   Component Value Date    WBC 7.45 02/15/2024    HGB 15.4 02/15/2024    HCT 44.8 02/15/2024    MCV 88.9 02/15/2024     02/15/2024       Lab Results   Component Value Date    CHOL 185 02/15/2024    CHLPL 146 05/09/2017    TRIG 55 02/15/2024    HDL 75 (H) 02/15/2024    LDL 99 02/15/2024       Assessment / Plan      Visit Diagnosis/Orders Placed This Visit:  Diagnoses and all orders for this visit:    1. Attention deficit hyperactivity disorder (ADHD), inattentive type, moderate (Primary)    2. Generalized anxiety disorder    3. Screening due  -     ECG 12 Lead; Future       Assessment & Plan  1. Anxiety.  Her anxiety levels have improved, now rated at a 6/10, down from an 8/10. She reports that her social anxiety has  also improved, although she still experiences some discomfort during social interactions. She does not experience panic attacks, mood swings, or anger outbursts.    2. Depression.  Her depressive symptoms have significantly improved, now rated at a 3/10, down from an 8/10. She reports no current feelings of helplessness or loneliness. She does not have any thoughts of suicide or self-harm.      Functional Status: Mild impairment     Prognosis: Good with Ongoing Treatment     Impression/Formulation:  Patient appeared alert and oriented.  Patient is voluntarily requesting to continue outpatient psychiatric treatment at [facility] [address]  Patient is receptive to assistance with maintaining a stable lifestyle.  Patient presents with history of     ICD-10-CM ICD-9-CM   1. Attention deficit hyperactivity disorder (ADHD), inattentive type, moderate  F90.0 314.01   2. Generalized anxiety disorder  F41.1 300.02   3. Screening due  Z13.9 V82.9   .    Reviewed patient's previous provider notes. Reviewed most recent labs. Patient meets DSM V diagnostic criteria for diagnoses. Diagnoses may be updated as more information becomes available.       Treatment Plan:   Continue with Paxil 20mg and Adderall XR 20mg. No refills needed  Encouraged psychotherapy  Follow-up in 3 months and as needed    Patient will continue supportive psychotherapy efforts and medications as indicated. Clinic will obtain release of information for current treatment team for continuity of care as needed. Patient will contact this office, call 911 or present to the nearest emergency room should suicidal or homicidal ideations occur.  Discussed medication options and treatment plan of prescribed medication(s) as well as the risks, benefits, and potential side effects. Patient acknowledged and verbally consented to continue with current treatment plan and was educated on the importance of compliance with treatment and  follow-up appointments.     Medication risks and side effects discussed with patient including risk for worsening mood, changes in behavior, thoughts of suicide or homicide, induction of amado, serotonin syndrome, rare hyponatremia, rare SIADH, withdrawal symptoms if abrupt withdrawal, sexual dysfunction.   If any thoughts of SI or HI, worsening mood or changes in behavior, call 911 or crisis line [phone], or go to nearest ER at once. Patient agrees to notify close family/friend of new trial of antidepressants/info above. Pt.verbalizes understanding and consents to treatment with this medication.     Medication options for treatment of ADHD discussed including stimulant and non-stimulant options. Extensive education is provided regarding risks associated with stimulant use including but not limited to:, insomnia, headache, exacerbation of tics, nervousness, irritability, overstimulation, tremor, dizziness, anorexia or change in appetite, nausea, dry mouth, constipation, diarrhea, weight loss, sexual dysfunction, psychotic episodes, seizures, palpitations, tachycardia, hypertension, rare activation (activation of hypomania, amado, and/or suicidal ideations), cardiovascular adverse effects including sudden death. Patient denies any personal or family history of seizures or structural cardiac abnormalities.     Controlled substance agreement reviewed and signed by patient, Patient  is  informed that the medication is to be used by the patient only, the medication is to be used only as directed, and the medication should not be combined with other substances unless directed by a Provider/Prescriber. I advised patients to avoid taking  medication with alcohol or illicit/unprescribed substances., including THC. Patient understands that habitual use of THC while being prescribed a stimulant will result in provider discontinuing stimulant medication due to the cognition dulling effects of marijuana negating the cognitive  enhancing action of the stimulant. The patient verbalizes understanding and agreement with this in their own words, and wishes to pursue proposed treatment plan.        Quality Measures:   Never smoker    I advised Humberto Gold of the risks of tobacco use.     Follow Up:   Return in about 3 months (around 4/29/2025).      Patient or patient representative verbalized consent for the use of Ambient Listening during the visit with  GOGO Malone for chart documentation. 1/29/2025  13:35 EST    GOGO Malone  [facility] [address]    Answers submitted by the patient for this visit:  Anxiety (Submitted on 1/22/2025)  Chief Complaint: Anxiety  Visit: follow-up  Frequency: constantly  Severity: moderate  depressed mood: Yes  dry mouth: No  excessive worry: Yes  insomnia: Yes  irritability: Yes  malaise/fatigue: Yes  obsessions: Yes  Sleep quality: fair  Hours of sleep per night: 6 Hours  Aggravated by: family issues, social activities, work stress  Medication compliance: %  Additional information: None

## 2025-02-14 ENCOUNTER — TELEMEDICINE (OUTPATIENT)
Dept: PSYCHIATRY | Facility: CLINIC | Age: 30
End: 2025-02-14
Payer: COMMERCIAL

## 2025-02-14 DIAGNOSIS — F33.41 RECURRENT MAJOR DEPRESSIVE DISORDER, IN PARTIAL REMISSION: ICD-10-CM

## 2025-02-14 DIAGNOSIS — F41.1 GENERALIZED ANXIETY DISORDER: Primary | ICD-10-CM

## 2025-02-14 NOTE — TREATMENT PLAN
Multi-Disciplinary Problems (from Behavioral Health Treatment Plan)      Active Problems       Problem: Anxiety  Start Date: 02/14/25      Problem Details: The patient self-scales this problem as a 6 with 10 being the worst.          Goal Priority Start Date Expected End Date End Date    Patient will develop and implement behavioral and cognitive strategies to reduce anxiety and irrational fears. Medium 02/14/25 08/15/25 --    Goal Details: Progress toward goal:  The patient self-scales their progress related to this goal as a 6 with 10 being the worst.          Goal Intervention Frequency Start Date End Date    Help patient explore past emotional issues in relation to present anxiety. Q Month 02/14/25 --    Intervention Details: Duration of treatment until discharged.          Goal Intervention Frequency Start Date End Date    Help patient develop an awareness of their cognitive and physical responses to anxiety. Q Month 02/14/25 --    Intervention Details: Duration of treatment until discharged.                  Problem: Depression  Start Date: 02/14/25      Problem Details: The patient self-scales this problem as a 4 with 10 being the worst.          Goal Priority Start Date Expected End Date End Date    Patient will demonstrate the ability to initiate new constructive life skills outside of sessions on a consistent basis. Low 02/14/25 08/15/25 --    Goal Details: Progress toward goal:  The patient self-scales their progress related to this goal as a 4 with 10 being the worst.          Goal Intervention Frequency Start Date End Date    Assist patient in setting attainable activities of daily living goals. Q Month 02/14/25 --      Goal Intervention Frequency Start Date End Date    Provide education about depression Q Month 02/14/25 --    Intervention Details: Duration of treatment until discharged.          Goal Intervention Frequency Start Date End Date    Assist patient in developing healthy coping strategies. Q  Month 02/14/25 --    Intervention Details: Duration of treatment until discharged.                  Problem: ADHD (Adult)  Start Date: 02/14/25      Problem Details: The patient self-scales this problem as a 3 with 10 being the worst.        Goal Priority Start Date Expected End Date End Date    Patient will sustain attention and concentration to complete chores, and work responsibilites and increase positive interaction in all relationships. Medium 02/14/25 08/15/25 --    Goal Details: Progress toward goal:  The patient self-scales their progress related to this goal as a 3 with 10 being the worst.        Goal Intervention Frequency Start Date End Date    Assist patient in setting responsible goals and breaking down large tasks. Q Month 02/14/25 --    Intervention Details: Duration of treatment until discharged.        Goal Intervention Frequency Start Date End Date    Assist patient in using self monitoring checklist to improve attention, work performance, and social skills. Weekly 02/14/25 --    Intervention Details: Duration of treatment until discharged.                               I have discussed and reviewed this treatment plan with the patient.

## 2025-02-14 NOTE — PROGRESS NOTES
Mode of Visit: Video  Location of patient: -HOME-  Location of provider: +HOME+  You have chosen to receive care through a telehealth visit.  The patient has signed the video visit consent form.  The visit included audio and video interaction. No technical issues occurred during this visit.    Date: February 14, 2025  Time In: 8:00 a.m.  Time Out: 9:03 a.m.    This provider is located at the [facility] [address] [place] [address] using a secure [employer] [facility] Video Visit through [employer]. Patient is being seen remotely via telehealth at [address] [place] [address] in [place] and stated they are in a secure environment for this session. The patient's condition being diagnosed/treated is appropriate for telemedicine. The provider identified herself as well as her credentials. The patient, and/or patients guardian, consent to be seen remotely, and when consent is given they understand that the consent allows for patient identifiable information to be sent to a third party as needed. They may refuse to be seen remotely at any time. The electronic data is encrypted and password protected, and the patient and/or guardian has been advised of the potential risks to privacy not withstanding such measures.     You have chosen to receive care through a telehealth visit.  Do you consent to use a video/audio connection for your medical care today? Yes    PROGRESS NOTE  Data:  Humberto Gold is a 29 y.o. female who presents today for follow up    Chief Complaint: anxiety     History of Present Illness: Patient voiced she has felt more stable with where she is in life and has began making good progress with rebuilding her relationship with her parents.  Patient voiced the need to begin processing some of the events of her childhood and how they are affecting her as an adult. Patient began to discuss her inability to develop a sense of belonging  in her community [place] Patient discussed feeling like she had to take care of herself a lot of the time she was growing up.       Clinical Maneuvering/Intervention:    (Scales based on 0 - 10 with 10 being the worst)  Depression: 4 Anxiety: 6       Assisted patient in processing above session content; acknowledged and normalized patient’s thoughts, feelings, and concerns.  Rationalized patient thought process regarding rebuilding her relationship with her parents and processing trauma from childhood.  Discussed triggers associated with patient's anxiety and depression.  Also discussed coping skills for patient to implement such as spending quality time with parents, talking with her significant other to process childhood events, and journaling.    Allowed patient to freely discuss issues without interruption or judgment. Provided safe, confidential environment to facilitate the development of positive therapeutic relationship and encourage open, honest communication. Assisted patient in identifying risk factors which would indicate the need for higher level of care including thoughts to harm self or others and/or self-harming behavior and encouraged patient to contact this office, call 911, or present to the nearest emergency room should any of these events occur. Discussed crisis intervention services and means to access. Patient adamantly and convincingly denies current suicidal or homicidal ideation or perceptual disturbance.    Assessment:   Assessment   Patient appears to maintain relative stability as compared to their baseline.  However, patient continues to struggle with depression and anxiety which continues to cause impairment in important areas of functioning.  A result, they can be reasonably expected to continue to benefit from treatment and would likely be at increased risk for decompensation otherwise.    Mental Status Exam:   Hygiene:   good  Cooperation:   Cooperative  Eye Contact:  Good  Psychomotor Behavior:  Appropriate  Affect:  Full range  Mood: anxious, depresses  Speech:  Normal  Thought Process:  Goal directed  Thought Content:  Mood congruent  Suicidal:  None  Homicidal:  None  Hallucinations:  None  Delusion:  None  Memory:  Intact  Orientation:  Person, Place, Time, and Situation  Reliability:  good  Insight:  Fair  Judgement:  Good  Impulse Control:  Good  Physical/Medical Issues:  No        Patient's Support Network Includes:  significant other, parents, extended family, and friends    Functional Status: Mild impairment     Progress toward goal: Not at goal    Prognosis: Good with Ongoing Treatment          Plan:    Patient will continue in individual outpatient therapy with focus on improved functioning and coping skills, maintaining stability, and avoiding decompensation and the need for higher level of care.    Patient will adhere to medication regimen as prescribed and report any side effects. Patient will contact this office, call 911 or present to the nearest emergency room should suicidal or homicidal ideations occur. Provide Cognitive Behavioral Therapy and Solution Focused Therapy to improve functioning, maintain stability, and avoid decompensation and the need for higher level of care.     Return in about 5 weeks (around 3/21/2025).            VISIT DIAGNOSIS:     ICD-10-CM ICD-9-CM   1. Generalized anxiety disorder  F41.1 300.02   2. Recurrent major depressive disorder, in partial remission  F33.41 296.35        Psychotherapy time 60 minutes. This time is exclusive to the therapy session and separate from the time spent on activities used to meet the criteria for the E/M service (history, exam, medical decision making).        This document has been electronically signed by Shannon Ballard LCSW  February 14, 2025 12:45 EST     Part of this note may be an electronic transcription/translation of spoken language to printed text using the Dragon  Dictation System.

## 2025-02-21 DIAGNOSIS — F90.0 ATTENTION DEFICIT HYPERACTIVITY DISORDER (ADHD), INATTENTIVE TYPE, MODERATE: ICD-10-CM

## 2025-02-21 NOTE — TELEPHONE ENCOUNTER
Rx Refill Note  Requested Prescriptions     Pending Prescriptions Disp Refills    amphetamine-dextroamphetamine XR (ADDERALL XR) 20 MG 24 hr capsule 30 capsule 0     Sig: Take 1 capsule by mouth Daily      Last office visit with prescribing clinician: 1/29/2025   Last telemedicine visit with prescribing clinician: Visit date not found   Next office visit with prescribing clinician: 4/29/2025                         Would you like a call back once the refill request has been completed: [] Yes [] No    If the office needs to give you a call back, can they leave a voicemail: [] Yes [] No    Maeve Hoyt LPN  02/21/25, 10:51 EST

## 2025-02-23 RX ORDER — DEXTROAMPHETAMINE SACCHARATE, AMPHETAMINE ASPARTATE MONOHYDRATE, DEXTROAMPHETAMINE SULFATE AND AMPHETAMINE SULFATE 5; 5; 5; 5 MG/1; MG/1; MG/1; MG/1
20 CAPSULE, EXTENDED RELEASE ORAL DAILY
Qty: 30 CAPSULE | Refills: 0 | Status: SHIPPED | OUTPATIENT
Start: 2025-02-23 | End: 2026-02-23

## 2025-02-28 ENCOUNTER — TELEMEDICINE (OUTPATIENT)
Dept: PSYCHIATRY | Facility: CLINIC | Age: 30
End: 2025-02-28
Payer: COMMERCIAL

## 2025-02-28 DIAGNOSIS — F90.0 ADHD (ATTENTION DEFICIT HYPERACTIVITY DISORDER), INATTENTIVE TYPE: Primary | ICD-10-CM

## 2025-02-28 DIAGNOSIS — F41.1 GENERALIZED ANXIETY DISORDER: ICD-10-CM

## 2025-02-28 NOTE — PROGRESS NOTES
Mode of Visit: Video  Location of patient: -HOME-  Location of provider: +HOME+  You have chosen to receive care through a telehealth visit.  The patient has signed the video visit consent form.  The visit included audio and video interaction. No technical issues occurred during this visit.    Date: February 28, 2025  Time In: 8:01 a.m.  Time Out: 9:03 a.m.     This provider is located at the [facility]), [address] [place] [address] using a secure [employer] Video Visit through [employer]. Patient is being seen remotely via telehealth at [address] [place] [address] address in [place] and stated they are in a secure environment for this session. The patient's condition being diagnosed/treated is appropriate for telemedicine. The provider identified herself as well as her credentials. The patient, and/or patients guardian, consent to be seen remotely, and when consent is given they understand that the consent allows for patient identifiable information to be sent to a third party as needed. They may refuse to be seen remotely at any time. The electronic data is encrypted and password protected, and the patient and/or guardian has been advised of the potential risks to privacy not withstanding such measures.     You have chosen to receive care through a telehealth visit.  Do you consent to use a video/audio connection for your medical care today? Yes    PROGRESS NOTE  Data:  Humberto Gold is a 29 y.o. female who presents today for follow up    Chief Complaint: anxiety    History of Present Illness: Patient informed her and her mother have continued conversations about her wedding that are stressful. Patient voiced she feels that her and her mother continue to work on their relationship and feel that their relationship is getting better. Patient discussed her relationship with her father getting better as well. Patient voiced she feels  anxious about spending more time with her parents on weekends this coming month.  Patient discussed the secrecy she feels  she has to keep in her parents' home. Patient voiced she has the support of her fiance, her parents, and friends.       Clinical Maneuvering/Intervention:    (Scales based on 0 - 10 with 10 being the worst)  Depression: 1 Anxiety: 5       Assisted patient in processing above session content; acknowledged and normalized patient’s thoughts, feelings, and concerns.  Rationalized patient thought process regarding the stress of a wedding and the stress of rebuilding her relationship with her parents.  Discussed triggers associated with patient's ADHD and anxiety.  Also discussed coping skills for patient to implement such as talking things through with her fiance, spending time with friends, doing self-care.    Allowed patient to freely discuss issues without interruption or judgment. Provided safe, confidential environment to facilitate the development of positive therapeutic relationship and encourage open, honest communication. Assisted patient in identifying risk factors which would indicate the need for higher level of care including thoughts to harm self or others and/or self-harming behavior and encouraged patient to contact this office, call 911, or present to the nearest emergency room should any of these events occur. Discussed crisis intervention services and means to access. Patient adamantly and convincingly denies current suicidal or homicidal ideation or perceptual disturbance.    Assessment:   Assessment   Patient appears to maintain relative stability as compared to their baseline.  However, patient continues to struggle with ADHD related symptoms and anxiety which continues to cause impairment in important areas of functioning.  A result, they can be reasonably expected to continue to benefit from treatment and would likely be at increased risk for decompensation  otherwise.    Mental Status Exam:   Hygiene:   good  Cooperation:  Cooperative  Eye Contact:  Good  Psychomotor Behavior:  Appropriate  Affect:  Full range  Mood: anxious  Speech:  Normal  Thought Process:  Goal directed  Thought Content:  Mood congruent  Suicidal:  None  Homicidal:  None  Hallucinations:  None  Delusion:  None  Memory:  Intact  Orientation:  Person, Place, Time, and Situation  Reliability:  good  Insight:  Fair  Judgement:  Good  Impulse Control:  Fair  Physical/Medical Issues:  No        Patient's Support Network Includes:  significant other, parents, extended family, and friends    Functional Status: Mild impairment     Progress toward goal: Not at goal    Prognosis: Good with Ongoing Treatment          Plan:    Patient will continue in individual outpatient therapy with focus on improved functioning and coping skills, maintaining stability, and avoiding decompensation and the need for higher level of care.    Patient will adhere to medication regimen as prescribed and report any side effects. Patient will contact this office, call 911 or present to the nearest emergency room should suicidal or homicidal ideations occur. Provide Cognitive Behavioral Therapy and Solution Focused Therapy to improve functioning, maintain stability, and avoid decompensation and the need for higher level of care.     Return in about 2 weeks (around 3/14/2025).            VISIT DIAGNOSIS:     ICD-10-CM ICD-9-CM   1. ADHD (attention deficit hyperactivity disorder), inattentive type  F90.0 314.00   2. Generalized anxiety disorder  F41.1 300.02        Psychotherapy time 60 minutes. This time is exclusive to the therapy session and separate from the time spent on activities used to meet the criteria for the E/M service (history, exam, medical decision making).        This document has been electronically signed by Shannon Ballard LCSW  February 28, 2025 14:42 EST     Part of this note may be an electronic  transcription/translation of spoken language to printed text using the Dragon Dictation System.

## 2025-03-14 ENCOUNTER — TELEMEDICINE (OUTPATIENT)
Dept: PSYCHIATRY | Facility: CLINIC | Age: 30
End: 2025-03-14
Payer: COMMERCIAL

## 2025-03-14 DIAGNOSIS — F41.1 GENERALIZED ANXIETY DISORDER: ICD-10-CM

## 2025-03-14 DIAGNOSIS — F90.0 ADHD (ATTENTION DEFICIT HYPERACTIVITY DISORDER), INATTENTIVE TYPE: Primary | ICD-10-CM

## 2025-03-14 RX ORDER — BUDESONIDE AND FORMOTEROL FUMARATE 160; 4.5 UG/1; UG/1
2 AEROSOL, METERED RESPIRATORY (INHALATION) 2 TIMES DAILY
Qty: 10.3 G | Refills: 0 | Status: SHIPPED | OUTPATIENT
Start: 2025-03-14

## 2025-03-14 RX ORDER — PAROXETINE 20 MG/1
20 TABLET, FILM COATED ORAL DAILY
Qty: 30 TABLET | Refills: 2 | Status: SHIPPED | OUTPATIENT
Start: 2025-03-14 | End: 2026-03-14

## 2025-03-14 NOTE — PROGRESS NOTES
Mode of Visit: Video  Location of patient: -HOME-  Location of provider: +HOME+  You have chosen to receive care through a telehealth visit.  The patient has signed the video visit consent form.  The visit included audio and video interaction. No technical issues occurred during this visit.    Date: March 14, 2025  Time In: 8:00 a.m.   Time Out: 9:00 a.m.     This provider is located at the [facility] [address] [place] [address] using a secure [employer] Video Visit through [employer]. Patient is being seen remotely via telehealth at [address] [place] [address] in [place] and stated they are in a secure environment for this session. The patient's condition being diagnosed/treated is appropriate for telemedicine. The provider identified herself as well as her credentials. The patient, and/or patients guardian, consent to be seen remotely, and when consent is given they understand that the consent allows for patient identifiable information to be sent to a third party as needed. They may refuse to be seen remotely at any time. The electronic data is encrypted and password protected, and the patient and/or guardian has been advised of the potential risks to privacy not withstanding such measures.     You have chosen to receive care through a telehealth visit.  Do you consent to use a video/audio connection for your medical care today? Yes    PROGRESS NOTE  Data:  Humberto Gold is a 29 y.o. female who presents today for follow up    Chief Complaint: anxiety and ADHD symptoms    History of Present Illness: Patient voiced  that she has been working on her relationship with her mother.  Patient voiced that she continues to see differences between her mother and herself that make it difficult to communicate with her mother in the same way as she does with other family members.  Patient discussed that she is slowly figuring out how to  communicate with her mother that does not cause dissonance between the two of them. Patient continues to discover how alike her and her father are.  Patient has the support of extended family members, her parents, and her paramour.        Clinical Maneuvering/Intervention:    (Scales based on 0 - 10 with 10 being the worst)  Depression: 2 Anxiety: 6       Assisted patient in processing above session content; acknowledged and normalized patient’s thoughts, feelings, and concerns.  Rationalized patient thought process regarding connnecting with her mother.  Discussed triggers associated with patient's anxiety and ADHD relatd symptoms.  Also discussed coping skills for patient to implement such as using communication lissy is neede .    Allowed patient to freely discuss issues without interruption or judgment. Provided safe, confidential environment to facilitate the development of positive therapeutic relationship and encourage open, honest communication. Assisted patient in identifying risk factors which would indicate the need for higher level of care including thoughts to harm self or others and/or self-harming behavior and encouraged patient to contact this office, call 911, or present to the nearest emergency room should any of these events occur. Discussed crisis intervention services and means to access. Patient adamantly and convincingly denies current suicidal or homicidal ideation or perceptual disturbance.    Assessment:   Assessment   Patient appears to maintain relative stability as compared to their baseline.  However, patient continues to struggle with anxiety and ADHD  which continues to cause impairment in important areas of functioning.  A result, they can be reasonably expected to continue to benefit from treatment and would likely be at increased risk for decompensation otherwise.    Mental Status Exam:   Hygiene:   good  Cooperation:  Cooperative  Eye Contact:  Good  Psychomotor Behavior:   Appropriate  Affect:  Full range  Mood: anxious  Speech:  Normal  Thought Process:  Goal directed  Thought Content:  Mood congruent  Suicidal:  None  Homicidal:  None  Hallucinations:  None  Delusion:  None  Memory:  Intact  Orientation:  Person, Place, Time, and Situation  Reliability:  good  Insight:  Good  Judgement:  Good  Impulse Control:  Good  Physical/Medical Issues:  No        Patient's Support Network Includes:  significant other, parents, extended family, and friends    Functional Status: Mild impairment     Progress toward goal: Not at goal    Prognosis: Good with Ongoing Treatment          Plan:    Patient will continue in individual outpatient therapy with focus on improved functioning and coping skills, maintaining stability, and avoiding decompensation and the need for higher level of care.    Patient will adhere to medication regimen as prescribed and report any side effects. Patient will contact this office, call 911 or present to the nearest emergency room should suicidal or homicidal ideations occur. Provide Cognitive Behavioral Therapy and Solution Focused Therapy to improve functioning, maintain stability, and avoid decompensation and the need for higher level of care.     Return in about 4 weeks (around 4/11/2025).            VISIT DIAGNOSIS:     ICD-10-CM ICD-9-CM   1. ADHD (attention deficit hyperactivity disorder), inattentive type  F90.0 314.00   2. Generalized anxiety disorder  F41.1 300.02        Psychotherapy time 60 minutes. This time is exclusive to the therapy session and separate from the time spent on activities used to meet the criteria for the E/M service (history, exam, medical decision making).        This document has been electronically signed by Shannon Ballard LCSW  March 14, 2025 09:08 EDT     Part of this note may be an electronic transcription/translation of spoken language to printed text using the Dragon Dictation System.

## 2025-03-27 DIAGNOSIS — F90.0 ATTENTION DEFICIT HYPERACTIVITY DISORDER (ADHD), INATTENTIVE TYPE, MODERATE: ICD-10-CM

## 2025-03-28 RX ORDER — DEXTROAMPHETAMINE SACCHARATE, AMPHETAMINE ASPARTATE MONOHYDRATE, DEXTROAMPHETAMINE SULFATE AND AMPHETAMINE SULFATE 5; 5; 5; 5 MG/1; MG/1; MG/1; MG/1
20 CAPSULE, EXTENDED RELEASE ORAL DAILY
Qty: 30 CAPSULE | Refills: 0 | Status: SHIPPED | OUTPATIENT
Start: 2025-03-28 | End: 2026-03-28

## 2025-03-28 NOTE — TELEPHONE ENCOUNTER
Rx Refill Note  Requested Prescriptions     Pending Prescriptions Disp Refills    amphetamine-dextroamphetamine XR (ADDERALL XR) 20 MG 24 hr capsule 30 capsule 0     Sig: Take 1 capsule by mouth Daily      Last office visit with prescribing clinician: 1/29/2025   Last telemedicine visit with prescribing clinician: Visit date not found   Next office visit with prescribing clinician: 4/29/2025                         Would you like a call back once the refill request has been completed: [] Yes [] No    If the office needs to give you a call back, can they leave a voicemail: [] Yes [] No    Maeve Hoyt LPN  03/28/25, 08:24 EDT

## 2025-04-11 ENCOUNTER — TELEMEDICINE (OUTPATIENT)
Dept: PSYCHIATRY | Facility: CLINIC | Age: 30
End: 2025-04-11
Payer: COMMERCIAL

## 2025-04-11 DIAGNOSIS — F90.0 ADHD (ATTENTION DEFICIT HYPERACTIVITY DISORDER), INATTENTIVE TYPE: Primary | ICD-10-CM

## 2025-04-11 DIAGNOSIS — F41.1 GENERALIZED ANXIETY DISORDER: ICD-10-CM

## 2025-04-11 NOTE — PROGRESS NOTES
Mode of Visit: Video  Location of patient: -HOME-  Location of provider: +HOME+  You have chosen to receive care through a telehealth visit.  The patient has signed the video visit consent form.  The visit included audio and video interaction. No technical issues occurred during this visit.    Date: April 11, 2025  Time In: 8:02 a.m.  Time Out: 8:59 a.m.    This provider is located at the [facility]), [address] [place] [address] using a secure [employer] Video Visit through [employer]. Patient is being seen remotely via telehealth at [address] [place] [address] in [place] and stated they are in a secure environment for this session. The patient's condition being diagnosed/treated is appropriate for telemedicine. The provider identified herself as well as her credentials. The patient, and/or patients guardian, consent to be seen remotely, and when consent is given they understand that the consent allows for patient identifiable information to be sent to a third party as needed. They may refuse to be seen remotely at any time. The electronic data is encrypted and password protected, and the patient and/or guardian has been advised of the potential risks to privacy not withstanding such measures.     You have chosen to receive care through a telehealth visit.  Do you consent to use a video/audio connection for your medical care today? Yes    PROGRESS NOTE  Data:  Humberto Gold is a 29 y.o. female who presents today for follow up    Chief Complaint: anxiety     History of Present Illness: Patient voiced she has recently had difficulty with a friend relationship. Patient and therapist discussed ways of being friendly with others without being good friends.  Patient voiced conflict with her mother recently and how she was able to work through the conflict.  Patient discussed relationships and how they affect her everyday functioning.   Patient voiced she has the support of her father, significant other, and friends.      Clinical Maneuvering/Intervention:    (Scales based on 0 - 10 with 10 being the worst)  Depression: 3 Anxiety: 5       Assisted patient in processing above session content; acknowledged and normalized patient’s thoughts, feelings, and concerns.  Rationalized patient thought process regarding executive functions.  Discussed triggers associated with patient's ADHD related symptoms.  Also discussed coping skills for patient to implement such as continue to discuss feelings with mother and significant other, spending quality time with friends, and self-care.    Allowed patient to freely discuss issues without interruption or judgment. Provided safe, confidential environment to facilitate the development of positive therapeutic relationship and encourage open, honest communication. Assisted patient in identifying risk factors which would indicate the need for higher level of care including thoughts to harm self or others and/or self-harming behavior and encouraged patient to contact this office, call 911, or present to the nearest emergency room should any of these events occur. Discussed crisis intervention services and means to access. Patient adamantly and convincingly denies current suicidal or homicidal ideation or perceptual disturbance.    Assessment:   Assessment   Patient appears to maintain relative stability as compared to their baseline.  However, patient continues to struggle with ADHD related symptoms which continues to cause impairment in important areas of functioning.  A result, they can be reasonably expected to continue to benefit from treatment and would likely be at increased risk for decompensation otherwise.    Mental Status Exam:   Hygiene:   good  Cooperation:  Cooperative  Eye Contact:  Good  Psychomotor Behavior:  Appropriate  Affect:  Appropriate  Mood: anxious  Speech:  Normal  Thought Process:  Goal  directed  Thought Content:  Mood congruent  Suicidal:  None  Homicidal:  None  Hallucinations:  None  Delusion:  None  Memory:  Intact  Orientation:  Person, Place, Time, and Situation  Reliability:  good  Insight:  Good  Judgement:  Good  Impulse Control:  Fair  Physical/Medical Issues:  No        Patient's Support Network Includes:  significant other, parents, extended family, and friends    Functional Status: Mild impairment     Progress toward goal: Not at goal    Prognosis: Good with Ongoing Treatment          Plan:    Patient will continue in individual outpatient therapy with focus on improved functioning and coping skills, maintaining stability, and avoiding decompensation and the need for higher level of care.    Patient will adhere to medication regimen as prescribed and report any side effects. Patient will contact this office, call 911 or present to the nearest emergency room should suicidal or homicidal ideations occur. Provide Cognitive Behavioral Therapy and Solution Focused Therapy to improve functioning, maintain stability, and avoid decompensation and the need for higher level of care.     Return in about 2 weeks (around 4/25/2025).            VISIT DIAGNOSIS:     ICD-10-CM ICD-9-CM   1. ADHD (attention deficit hyperactivity disorder), inattentive type  F90.0 314.00   2. Generalized anxiety disorder  F41.1 300.02        Psychotherapy time 59 minutes. This time is exclusive to the therapy session and separate from the time spent on activities used to meet the criteria for the E/M service (history, exam, medical decision making).        This document has been electronically signed by Shannon Ballard LCSW  April 11, 2025 14:58 EDT     Part of this note may be an electronic transcription/translation of spoken language to printed text using the Dragon Dictation System.

## 2025-04-14 RX ORDER — BUDESONIDE AND FORMOTEROL FUMARATE DIHYDRATE 160; 4.5 UG/1; UG/1
2 AEROSOL RESPIRATORY (INHALATION) 2 TIMES DAILY
Qty: 10.2 G | Refills: 0 | Status: SHIPPED | OUTPATIENT
Start: 2025-04-14

## 2025-04-25 ENCOUNTER — TELEMEDICINE (OUTPATIENT)
Dept: PSYCHIATRY | Facility: CLINIC | Age: 30
End: 2025-04-25
Payer: COMMERCIAL

## 2025-04-25 DIAGNOSIS — F33.0 MILD EPISODE OF RECURRENT MAJOR DEPRESSIVE DISORDER: ICD-10-CM

## 2025-04-25 DIAGNOSIS — F90.0 ADHD (ATTENTION DEFICIT HYPERACTIVITY DISORDER), INATTENTIVE TYPE: Primary | ICD-10-CM

## 2025-04-25 NOTE — PROGRESS NOTES
Mode of Visit: Video  Location of patient: -HOME-  Location of provider: +HOME+  You have chosen to receive care through a telehealth visit.  The patient has signed the video visit consent form.  The visit included audio and video interaction. No technical issues occurred during this visit.    Date: April 25, 2025  Time In: 9:10 a.m.   Time Out: 10:06 a.m.     This provider is located at the [facility]), [address] [place] [address] using a secure Status4 Video Visit through [employer]. Patient is being seen remotely via telehealth at [address] [place] [address] in [place] and stated they are in a secure environment for this session. The patient's condition being diagnosed/treated is appropriate for telemedicine. The provider identified herself as well as her credentials. The patient, and/or patients guardian, consent to be seen remotely, and when consent is given they understand that the consent allows for patient identifiable information to be sent to a third party as needed. They may refuse to be seen remotely at any time. The electronic data is encrypted and password protected, and the patient and/or guardian has been advised of the potential risks to privacy not withstanding such measures.     You have chosen to receive care through a telehealth visit.  Do you consent to use a video/audio connection for your medical care today? Yes    PROGRESS NOTE  Data:  Humberto Gold is a 29 y.o. female who presents today for follow up    Chief Complaint: depression, ADHD     History of Present Illness: Patient voiced that she has had to end a friendship this week  due to the friend being unable to show healthy boundaries with their group of friends.  Patient discussed her relationship with her mother growing up and how staying with her recently has triggered some of those feelings to return and cause her to discover some feelings that  are difficult to process.  Patient and client worked on verbal processing her current behaviors and how they are affected by her past.  Patient voiced she has the support of her fiance, friends, and family.      Clinical Maneuvering/Intervention:    (Scales based on 0 - 10 with 10 being the worst)  Depression: 2 Anxiety: 4       Assisted patient in processing above session content; acknowledged and normalized patient’s thoughts, feelings, and concerns.  Rationalized patient thought process regarding her past relationship with her mother and how it affects her current relationships.  Discussed triggers associated with patient's ADHD, depression, and anxiety.  Also discussed coping skills for patient to implement such as talking through things with her fiance, spending time with friends, talking with her mother when appropriate about her feelings.    Allowed patient to freely discuss issues without interruption or judgment. Provided safe, confidential environment to facilitate the development of positive therapeutic relationship and encourage open, honest communication. Assisted patient in identifying risk factors which would indicate the need for higher level of care including thoughts to harm self or others and/or self-harming behavior and encouraged patient to contact this office, call 911, or present to the nearest emergency room should any of these events occur. Discussed crisis intervention services and means to access. Patient adamantly and convincingly denies current suicidal or homicidal ideation or perceptual disturbance.    Assessment:   Assessment   Patient appears to maintain relative stability as compared to their baseline.  However, patient continues to struggle with depression and anxiety which continues to cause impairment in important areas of functioning.  A result, they can be reasonably expected to continue to benefit from treatment and would likely be at increased risk for decompensation  otherwise.    Mental Status Exam:   Hygiene:   good  Cooperation:  Cooperative  Eye Contact:  Good  Psychomotor Behavior:  Appropriate  Affect:  Full range  Mood: anxious  Speech:  Normal  Thought Process:  Goal directed  Thought Content:  Mood congruent  Suicidal:  None  Homicidal:  None  Hallucinations:  None  Delusion:  None  Memory:  Intact  Orientation:  Person, Place, Time, and Situation  Reliability:  good  Insight:  Good  Judgement:  Good  Impulse Control:  Good  Physical/Medical Issues:  No        Patient's Support Network Includes:  significant other, parents, and friends    Functional Status: Mild impairment     Progress toward goal: Not at goal    Prognosis: Good with Ongoing Treatment          Plan:    Patient will continue in individual outpatient therapy with focus on improved functioning and coping skills, maintaining stability, and avoiding decompensation and the need for higher level of care.    Patient will adhere to medication regimen as prescribed and report any side effects. Patient will contact this office, call 911 or present to the nearest emergency room should suicidal or homicidal ideations occur. Provide Cognitive Behavioral Therapy and Solution Focused Therapy to improve functioning, maintain stability, and avoid decompensation and the need for higher level of care.     Return in about 3 weeks (around 5/16/2025).            VISIT DIAGNOSIS:     ICD-10-CM ICD-9-CM   1. ADHD (attention deficit hyperactivity disorder), inattentive type  F90.0 314.00   2. Mild episode of recurrent major depressive disorder  F33.0 296.31        Psychotherapy time 56 minutes. This time is exclusive to the therapy session and separate from the time spent on activities used to meet the criteria for the E/M service (history, exam, medical decision making).        This document has been electronically signed by Shannon Ballard LCSW  April 25, 2025 13:39 EDT     Part of this note may be an electronic  transcription/translation of spoken language to printed text using the Dragon Dictation System.

## 2025-04-29 ENCOUNTER — TELEMEDICINE (OUTPATIENT)
Age: 30
End: 2025-04-29
Payer: COMMERCIAL

## 2025-04-29 DIAGNOSIS — F41.1 GENERALIZED ANXIETY DISORDER: ICD-10-CM

## 2025-04-29 DIAGNOSIS — F90.0 ATTENTION DEFICIT HYPERACTIVITY DISORDER (ADHD), INATTENTIVE TYPE, MODERATE: Primary | ICD-10-CM

## 2025-04-29 RX ORDER — DEXTROAMPHETAMINE SACCHARATE, AMPHETAMINE ASPARTATE MONOHYDRATE, DEXTROAMPHETAMINE SULFATE AND AMPHETAMINE SULFATE 5; 5; 5; 5 MG/1; MG/1; MG/1; MG/1
20 CAPSULE, EXTENDED RELEASE ORAL DAILY
Qty: 30 CAPSULE | Refills: 0 | Status: SHIPPED | OUTPATIENT
Start: 2025-04-29 | End: 2026-04-29

## 2025-04-29 NOTE — PROGRESS NOTES
Follow Up Office Visit      Patient Name: Humberto Gold  : 1995   MRN: [id number]     Referring Provider: Jadyn Dhillon DO    Chief Complaint:      ICD-10-CM ICD-9-CM   1. Attention deficit hyperactivity disorder (ADHD), inattentive type, moderate  F90.0 314.01   2. Generalized anxiety disorder  F41.1 300.02        History of Present Illness:   Humberto Gold is a 29 y.o. female who is here today for follow up and medication management.      Subjective      Patient Reports:   History of Present Illness  She reports a satisfactory response to her current medication regimen, with no adverse effects noted. She has been experiencing an increase in anxiety, which she attributes to recent life events rather than her medications. She has been addressing this with her therapist, Shannon. She does not report any episodes of panic attacks, anger outbursts, or lashing out. She does not endorse feelings of depression, suicidal ideation, self-harm, or hallucinations. Her sleep pattern is generally normal, with the exception of the previous night. Her appetite remains stable. She does not report any changes in her physical health or feelings of hopelessness or loneliness.    She reports that Adderall has significantly improved her attention and focus, particularly in the work setting where she spends over 8 hours seated daily.    MEDICATIONS  Adderall, Paxil    Review of Systems:   Review of Systems   Constitutional:  Negative for appetite change, fatigue and unexpected weight change.   Eyes:  Negative for visual disturbance.   Respiratory:  Negative for chest tightness and shortness of breath.    Cardiovascular:  Negative for chest pain and palpitations.   Gastrointestinal:  Negative for nausea.   Musculoskeletal:  Negative for gait problem.   Skin:  Negative for rash and wound.   Neurological:  Negative for dizziness, tremors, seizures, weakness, light-headedness and headaches.   Psychiatric/Behavioral:   Negative for agitation, behavioral problems, confusion, decreased concentration, dysphoric mood, hallucinations, self-injury, sleep disturbance and suicidal ideas. The patient is not nervous/anxious and is not hyperactive.    Sleep pattern: 7-8 hours per night   Appetite: normal          RISK ASSESSMENT:  Patient denies any thoughts or intent of suicide today. Patient denies any impulsive behavior today.     Medications:     Current Outpatient Medications:     amphetamine-dextroamphetamine XR (ADDERALL XR) 20 MG 24 hr capsule, Take 1 capsule by mouth Daily, Disp: 30 capsule, Rfl: 0    albuterol (PROVENTIL) (2.5 MG/3ML) 0.083% nebulizer solution, Take 2.5 mg by nebulization Every 6 (Six) Hours As Needed for Shortness of Air., Disp: , Rfl:     albuterol sulfate  (90 Base) MCG/ACT inhaler, Inhale 2 puffs Every 6 (Six) Hours As Needed for Wheezing or Shortness of Air., Disp: 8 g, Rfl: 0    budesonide-formoterol (SYMBICORT) 160-4.5 MCG/ACT inhaler, INHALE 2 PUFFS BY MOUTH TWICE DAILY, Disp: 10.2 g, Rfl: 0    Levocetirizine Dihydrochloride (XYZAL ALLERGY 24HR PO), , Disp: , Rfl:     PARoxetine (PAXIL) 20 MG tablet, TAKE 1 TABLET BY MOUTH DAILY, Disp: 30 tablet, Rfl: 2    Medication Considerations:  JUDIE reviewed and appropriate.      Allergies:   Allergies   Allergen Reactions    Peanuts [Peanut Oil] Anaphylaxis    Penicillins Anaphylaxis       Results Reviewed:   Yes      Objective     Physical Exam:  Vital Signs: There were no vitals filed for this visit.  There is no height or weight on file to calculate BMI.   The patient was seen remotely today via a MyChart Video Visit through [facility].  Unable to obtain vital signs due to nature of remote visit.  Height stated at 5'8 inches.  Weight stated at 163 pounds.     Mental Status Exam:   MENTAL STATUS EXAM   General Appearance:  Cleanly groomed and dressed and well developed  Eye Contact:  Good eye contact  Attitude:  Cooperative  Motor Activity:  Other  Other  Comment:  [facility] video visit  Muscle Strength:  Other  Other Comment:  [facility] video visit  Speech:  Normal rate, tone, volume  Language:  Spontaneous  Mood and affect:  Normal, pleasant  Hopelessness:  Denies  Loneliness: Denies  Thought Process:  Logical  Associations/ Thought Content:  No delusions  Hallucinations:  None  Suicidal Ideations:  Not present  Homicidal Ideation:  Not present  Sensorium:  Alert and clear  Orientation:  Person, place, time and situation  Immediate Recall, Recent, and Remote Memory:  Intact  Attention Span/ Concentration:  Good  Fund of Knowledge:  Appropriate for age and educational level  Intellectual Functioning:  Average range  Insight:  Good  Judgement:  Good  Reliability:  Good  Impulse Control:  Good       @RESULASTCBCDIFFPANEL,TSH,LABLIPI,ZUOJBBYN79,NZFPYZGA57,MG,FOLATE,PROLACTIN,CRPRESULT,CMP,N1EDYOFTHXK)@    Lab Results   Component Value Date    GLUCOSE 78 02/15/2024    BUN 6 02/15/2024    CREATININE 0.73 02/15/2024    EGFR 115.0 02/15/2024    BCR 8.2 02/15/2024    K 4.0 02/15/2024    CO2 22.9 02/15/2024    CALCIUM 9.6 02/15/2024    ALBUMIN 4.6 11/19/2024    BILITOT 0.3 11/19/2024    AST 17 11/19/2024    ALT 11 11/19/2024       Lab Results   Component Value Date    WBC 7.45 02/15/2024    HGB 15.4 02/15/2024    HCT 44.8 02/15/2024    MCV 88.9 02/15/2024     02/15/2024       Lab Results   Component Value Date    CHOL 185 02/15/2024    CHLPL 146 05/09/2017    TRIG 55 02/15/2024    HDL 75 (H) 02/15/2024    LDL 99 02/15/2024       Assessment / Plan      Visit Diagnosis/Orders Placed This Visit:  Diagnoses and all orders for this visit:    1. Attention deficit hyperactivity disorder (ADHD), inattentive type, moderate (Primary)  -     amphetamine-dextroamphetamine XR (ADDERALL XR) 20 MG 24 hr capsule; Take 1 capsule by mouth Daily  Dispense: 30 capsule; Refill: 0    2. Generalized anxiety disorder       Assessment & Plan  1. Anxiety.  She reports that her anxiety has been slightly  elevated due to life stressors but is managing it with the help of her therapist, Shannon. She is not experiencing any panic attacks, anger outbursts, or negative side effects from her current medication. She does not feel depressed and has no thoughts of suicide, self-harm, or hallucinations. Her sleep and appetite are normal. She will continue her current medication regimen.    2. Attention deficit hyperactivity disorder.  She reports that Adderall has significantly improved her attention and focus, especially at work. She will continue taking Adderall as prescribed.      Functional Status: Mild impairment     Prognosis: Good with Ongoing Treatment     Impression/Formulation:  Patient appeared alert and oriented.  Patient is voluntarily requesting to continue outpatient psychiatric treatment at [facility] [address] [facility].  Patient is receptive to assistance with maintaining a stable lifestyle.  Patient presents with history of     ICD-10-CM ICD-9-CM   1. Attention deficit hyperactivity disorder (ADHD), inattentive type, moderate  F90.0 314.01   2. Generalized anxiety disorder  F41.1 300.02   .    Reviewed patient's previous provider notes. Reviewed most recent labs. Patient meets DSM V diagnostic criteria for diagnoses. Diagnoses may be updated as more information becomes available.       Treatment Plan:   Continue with Paxil 20mg and Adderall XR 20mg. Refilled Adderall  Encouraged to continue psychotherapy  Follow-up in 3 months and as needed    Patient will continue supportive psychotherapy efforts and medications as indicated. Clinic will obtain release of information for current treatment team for continuity of care as needed. Patient will contact this office, call 911 or present to the nearest emergency room should suicidal or homicidal ideations occur.  Discussed medication options and treatment plan of prescribed medication(s) as well as the risks, benefits, and potential side  effects. Patient acknowledged and verbally consented to continue with current treatment plan and was educated on the importance of compliance with treatment and follow-up appointments.     Medication options for treatment of ADHD discussed including stimulant and non-stimulant options. Extensive education is provided regarding risks associated with stimulant use including but not limited to:, insomnia, headache, exacerbation of tics, nervousness, irritability, overstimulation, tremor, dizziness, anorexia or change in appetite, nausea, dry mouth, constipation, diarrhea, weight loss, sexual dysfunction, psychotic episodes, seizures, palpitations, tachycardia, hypertension, rare activation (activation of hypomania, amado, and/or suicidal ideations), cardiovascular adverse effects including sudden death. Patient denies any personal or family history of seizures or structural cardiac abnormalities.     Controlled substance agreement reviewed and signed by patient, Patient  is  informed that the medication is to be used by the patient only, the medication is to be used only as directed, and the medication should not be combined with other substances unless directed by a Provider/Prescriber. I advised patients to avoid taking  medication with alcohol or illicit/unprescribed substances., including THC. Patient understands that habitual use of THC while being prescribed a stimulant will result in provider discontinuing stimulant medication due to the cognition dulling effects of marijuana negating the cognitive enhancing action of the stimulant. The patient verbalizes understanding and agreement with this in their own words, and wishes to pursue proposed treatment plan.     Medication risks and side effects discussed with patient including risk for worsening mood, changes in behavior, thoughts of suicide or homicide, induction of amado, serotonin syndrome, rare hyponatremia, rare SIADH, withdrawal symptoms if abrupt  withdrawal, sexual dysfunction.   If any thoughts of SI or HI, worsening mood or changes in behavior, call 911 or crisis line [phone], or go to nearest ER at once. Patient agrees to notify close family/friend of new trial of antidepressants/info above. Pt.verbalizes understanding and consents to treatment with this medication.    Quality Measures:   Never smoker    I advised Humberto Gold of the risks of tobacco use.     Follow Up:   Return in about 3 months (around 7/29/2025).      Patient or patient representative verbalized consent for the use of Ambient Listening during the visit with  GOGO Malone for chart documentation. 4/29/2025  08:05 EDT    GOGO Malone  [facility] [address]

## 2025-05-13 RX ORDER — BUDESONIDE AND FORMOTEROL FUMARATE 160; 4.5 UG/1; UG/1
2 AEROSOL, METERED RESPIRATORY (INHALATION) 2 TIMES DAILY
Qty: 10.3 G | Refills: 5 | Status: SHIPPED | OUTPATIENT
Start: 2025-05-13

## 2025-05-16 ENCOUNTER — TELEMEDICINE (OUTPATIENT)
Dept: PSYCHIATRY | Facility: CLINIC | Age: 30
End: 2025-05-16
Payer: COMMERCIAL

## 2025-05-16 NOTE — PROGRESS NOTES
Mode of Visit: Video  Location of patient: -HOME-  Location of provider: +HOME+  You have chosen to receive care through a telehealth visit.  The patient has signed the video visit consent form.  The visit included audio and video interaction. No technical issues occurred during this visit.    Date: May 16, 2025  Time In: 9:02 p.m.  Time Out: 10:04 a.m.    This provider is located at the [facility] (through [facility]), [address] [place] [address] using a secure FRESS Video Visit through [employer]. Patient is being seen remotely via telehealth at [address] [place] [address] in [place] and stated they are in a secure environment for this session. The patient's condition being diagnosed/treated is appropriate for telemedicine. The provider identified herself as well as her credentials. The patient, and/or patients guardian, consent to be seen remotely, and when consent is given they understand that the consent allows for patient identifiable information to be sent to a third party as needed. They may refuse to be seen remotely at any time. The electronic data is encrypted and password protected, and the patient and/or guardian has been advised of the potential risks to privacy not withstanding such measures.     You have chosen to receive care through a telehealth visit.  Do you consent to use a video/audio connection for your medical care today? Yes    PROGRESS NOTE  Data:  Humberto Gold is a 29 y.o. female who presents today for follow up    Chief Complaint: ADHD, anxiety, depression    History of Present Illness: Patient continues to detail difficult conversations with her mother regarding pt's wedding. Patient does not feel she can freely make decisions without consequences from her mother. Patient explained continued difficulty with communicating with her mother. Patient discussed progress she has made with her father in rebuilding their  relationship and the relationship with her fiance.  Patient voiced she has continued support from her fiance, her dad, and friends.        Clinical Maneuvering/Intervention:    (Scales based on 0 - 10 with 10 being the worst)  Depression: 4 Anxiety: 7       Assisted patient in processing above session content; acknowledged and normalized patient’s thoughts, feelings, and concerns.  Rationalized patient thought process regarding her upcoming wedding and interactions with her family.  Discussed triggers associated with patient's depression and anxiety.  Also discussed coping skills for patient to implement such as using techniques to communicate with her mother that include healthy boundaries, continuing to communicate what she wants for her wedding, and building familial relationships.    Allowed patient to freely discuss issues without interruption or judgment. Provided safe, confidential environment to facilitate the development of positive therapeutic relationship and encourage open, honest communication. Assisted patient in identifying risk factors which would indicate the need for higher level of care including thoughts to harm self or others and/or self-harming behavior and encouraged patient to contact this office, call 911, or present to the nearest emergency room should any of these events occur. Discussed crisis intervention services and means to access. Patient adamantly and convincingly denies current suicidal or homicidal ideation or perceptual disturbance.    Assessment:   Assessment   Patient appears to maintain relative stability as compared to their baseline.  However, patient continues to struggle with depression and anxiety which continues to cause impairment in important areas of functioning.  A result, they can be reasonably expected to continue to benefit from treatment and would likely be at increased risk for decompensation otherwise.    Mental Status Exam:   Hygiene:   good  Cooperation:   Cooperative  Eye Contact:  Good  Psychomotor Behavior:  Appropriate  Affect:  Full range  Mood: depressed and anxious  Speech:  Normal  Thought Process:  Goal directed  Thought Content:  Mood congruent  Suicidal:  None  Homicidal:  None  Hallucinations:  None  Delusion:  None  Memory:  Intact  Orientation:  Person, Place, Time, and Situation  Reliability:  good  Insight:  Fair  Judgement:  Good  Impulse Control:  Good  Physical/Medical Issues:  No        Patient's Support Network Includes:  significant other, father, extended family, and friends    Functional Status: Mild impairment     Progress toward goal: Not at goal    Prognosis: Good with Ongoing Treatment          Plan:    Patient will continue in individual outpatient therapy with focus on improved functioning and coping skills, maintaining stability, and avoiding decompensation and the need for higher level of care.    Patient will adhere to medication regimen as prescribed and report any side effects. Patient will contact this office, call 911 or present to the nearest emergency room should suicidal or homicidal ideations occur. Provide Cognitive Behavioral Therapy and Solution Focused Therapy to improve functioning, maintain stability, and avoid decompensation and the need for higher level of care.     No follow-ups on file.            VISIT DIAGNOSIS:   No diagnosis found.     Psychotherapy time 60 minutes. This time is exclusive to the therapy session and separate from the time spent on activities used to meet the criteria for the E/M service (history, exam, medical decision making).        This document has been electronically signed by Shannon Ballard LCSW  May 16, 2025 09:04 EDT     Part of this note may be an electronic transcription/translation of spoken language to printed text using the Dragon Dict[employer] System.

## 2025-05-30 DIAGNOSIS — F90.0 ATTENTION DEFICIT HYPERACTIVITY DISORDER (ADHD), INATTENTIVE TYPE, MODERATE: ICD-10-CM

## 2025-05-30 NOTE — TELEPHONE ENCOUNTER
Rx Refill Note  Requested Prescriptions     Pending Prescriptions Disp Refills    amphetamine-dextroamphetamine XR (ADDERALL XR) 20 MG 24 hr capsule 30 capsule 0     Sig: Take 1 capsule by mouth Daily      Last office visit with prescribing clinician: 1/29/2025   Last telemedicine visit with prescribing clinician: 4/29/2025   Next office visit with prescribing clinician: 7/29/2025     LA: 04/29/25 #30 0R                          Would you like a call back once the refill request has been completed: [] Yes [] No    If the office needs to give you a call back, can they leave a voicemail: [] Yes [] No    Jihan Snyder LPN  05/30/25, 11:34 EDT

## 2025-05-31 RX ORDER — DEXTROAMPHETAMINE SACCHARATE, AMPHETAMINE ASPARTATE MONOHYDRATE, DEXTROAMPHETAMINE SULFATE AND AMPHETAMINE SULFATE 5; 5; 5; 5 MG/1; MG/1; MG/1; MG/1
20 CAPSULE, EXTENDED RELEASE ORAL DAILY
Qty: 30 CAPSULE | Refills: 0 | Status: SHIPPED | OUTPATIENT
Start: 2025-05-31 | End: 2026-05-31

## 2025-06-06 ENCOUNTER — TELEMEDICINE (OUTPATIENT)
Dept: PSYCHIATRY | Facility: CLINIC | Age: 30
End: 2025-06-06
Payer: COMMERCIAL

## 2025-06-06 DIAGNOSIS — F41.1 GENERALIZED ANXIETY DISORDER: Primary | ICD-10-CM

## 2025-06-06 NOTE — PROGRESS NOTES
Mode of Visit: Video  Location of patient: -HOME-  Location of provider: +HOME+  You have chosen to receive care through a telehealth visit.  The patient has signed the video visit consent form.  The visit included audio and video interaction. No technical issues occurred during this visit.    Date: June 6, 2025  Time In: 9:02 a.m.   Time Out: 10:00 a.m.    This provider is located at the [facility]), [address] [place] [address] using a secure [employer] Video Visit through [employer]. Patient is being seen remotely via telehealth at [address] [place] [address] address in [place] and stated they are in a secure environment for this session. The patient's condition being diagnosed/treated is appropriate for telemedicine. The provider identified herself as well as her credentials. The patient, and/or patients guardian, consent to be seen remotely, and when consent is given they understand that the consent allows for patient identifiable information to be sent to a third party as needed. They may refuse to be seen remotely at any time. The electronic data is encrypted and password protected, and the patient and/or guardian has been advised of the potential risks to privacy not withstanding such measures.     You have chosen to receive care through a telehealth visit.  Do you consent to use a video/audio connection for your medical care today? Yes    PROGRESS NOTE  Data:  Humberto Gold is a 29 y.o. female who presents today for follow up    Chief Complaint: anxiety    History of Present Illness: Patient discussed continued discord between her and her mother and the difficulty of communicating in healthy ways with her mother.  Patient continues to have to make space to be her own self and not allow her mother to be co-dependent on her. Patient voiced frustration with her mother not being able to carry through on her committment to help with  her upcoming wedding and the realization she is having to do all of the planning without her mother's help and support. Patient has the support of her fiance, father, extended family, and friends.      Clinical Maneuvering/Intervention:    (Scales based on 0 - 10 with 10 being the worst)  Depression: 3 Anxiety: 7       Assisted patient in processing above session content; acknowledged and normalized patient’s thoughts, feelings, and concerns.  Rationalized patient thought process regarding communication with her mother, trauma in childhood.  Discussed triggers associated with patient's anxiety.  Also discussed coping skills for patient to implement such as spending times with extended family and friends, and continuing healthy boundaries and responses with her mother.    Allowed patient to freely discuss issues without interruption or judgment. Provided safe, confidential environment to facilitate the development of positive therapeutic relationship and encourage open, honest communication. Assisted patient in identifying risk factors which would indicate the need for higher level of care including thoughts to harm self or others and/or self-harming behavior and encouraged patient to contact this office, call 911, or present to the nearest emergency room should any of these events occur. Discussed crisis intervention services and means to access. Patient adamantly and convincingly denies current suicidal or homicidal ideation or perceptual disturbance.    Assessment:   Assessment   Patient appears to maintain relative stability as compared to their baseline.  However, patient continues to struggle with anxiety which continues to cause impairment in important areas of functioning.  A result, they can be reasonably expected to continue to benefit from treatment and would likely be at increased risk for decompensation otherwise.    Mental Status Exam:   Hygiene:   good  Cooperation:  Cooperative  Eye Contact:   Good  Psychomotor Behavior:  Appropriate  Affect:  Full range  Mood: anxious  Speech:  Normal  Thought Process:  Goal directed  Thought Content:  Mood congruent  Suicidal:  None  Homicidal:  None  Hallucinations:  None  Delusion:  None  Memory:  Intact  Orientation:  Person, Place, Time, and Situation  Reliability:  good  Insight:  Good  Judgement:  Good  Impulse Control:  Good  Physical/Medical Issues:  No        Patient's Support Network Includes:  significant other, parents, and friends    Functional Status: Mild impairment     Progress toward goal: Not at goal    Prognosis: Good with Ongoing Treatment          Plan:    Patient will continue in individual outpatient therapy with focus on improved functioning and coping skills, maintaining stability, and avoiding decompensation and the need for higher level of care.    Patient will adhere to medication regimen as prescribed and report any side effects. Patient will contact this office, call 911 or present to the nearest emergency room should suicidal or homicidal ideations occur. Provide Cognitive Behavioral Therapy and Solution Focused Therapy to improve functioning, maintain stability, and avoid decompensation and the need for higher level of care.     Return in about 3 weeks (around 6/27/2025).            VISIT DIAGNOSIS:     ICD-10-CM ICD-9-CM   1. Generalized anxiety disorder  F41.1 300.02        Psychotherapy time 55 minutes. This time is exclusive to the therapy session and separate from the time spent on activities used to meet the criteria for the E/M service (history, exam, medical decision making).        This document has been electronically signed by Shannon Ballard LCSW  June 6, 2025 13:58 EDT     Part of this note may be an electronic transcription/translation of spoken language to printed text using the Dragon Dictation System.

## 2025-06-11 DIAGNOSIS — F41.1 GENERALIZED ANXIETY DISORDER: ICD-10-CM

## 2025-06-11 RX ORDER — PAROXETINE 20 MG/1
20 TABLET, FILM COATED ORAL DAILY
Qty: 30 TABLET | Refills: 2 | Status: SHIPPED | OUTPATIENT
Start: 2025-06-11

## 2025-06-27 ENCOUNTER — TELEMEDICINE (OUTPATIENT)
Dept: PSYCHIATRY | Facility: CLINIC | Age: 30
End: 2025-06-27
Payer: COMMERCIAL

## 2025-06-27 DIAGNOSIS — F41.1 GENERALIZED ANXIETY DISORDER: Primary | ICD-10-CM

## 2025-06-27 NOTE — PROGRESS NOTES
Mode of Visit: Video  Location of patient: -HOME-  Location of provider: +HOME+  You have chosen to receive care through a telehealth visit.  The patient has signed the video visit consent form.  The visit included audio and video interaction. No technical issues occurred during this visit.    Date: June 27, 2025  Time In: 9:06 a.m.   Time Out: 10:01 a.m.    This provider is located at the [facility] [place] [facility]), [address] [place] [address] using a secure [employer] Video Visit through [employer]. Patient is being seen remotely via telehealth at [address] [place] [address] in [place] and stated they are in a secure environment for this session. The patient's condition being diagnosed/treated is appropriate for telemedicine. The provider identified herself as well as her credentials. The patient, and/or patients guardian, consent to be seen remotely, and when consent is given they understand that the consent allows for patient identifiable information to be sent to a third party as needed. They may refuse to be seen remotely at any time. The electronic data is encrypted and password protected, and the patient and/or guardian has been advised of the potential risks to privacy not withstanding such measures.     You have chosen to receive care through a telehealth visit.  Do you consent to use a video/audio connection for your medical care today? Yes    PROGRESS NOTE  Data:  Humberto Gold is a 29 y.o. female who presents today for follow up    Chief Complaint: anxiety    History of Present Illness: Patient discussed her upcoming wedding and the stress between family members related to her wedding.  Patient voiced excitement about upcoming activities related to the wedding. Patient continues to have stress related to her relationship with her mother.  Patient voiced she has the support of extended family, her fiance, and friends.        Clinical  Maneuvering/Intervention:    (Scales based on 0 - 10 with 10 being the worst)  Depression: 2 Anxiety: 6       Assisted patient in processing above session content; acknowledged and normalized patient’s thoughts, feelings, and concerns.  Rationalized patient thought process regarding her family conflict and upcoming travel.  Discussed triggers associated with patient's anxiety.  Also discussed coping skills for patient to implement such as trusting her feelings, relying on information she feels is correct, and spending time with family and friends in a healthy way.    Allowed patient to freely discuss issues without interruption or judgment. Provided safe, confidential environment to facilitate the development of positive therapeutic relationship and encourage open, honest communication. Assisted patient in identifying risk factors which would indicate the need for higher level of care including thoughts to harm self or others and/or self-harming behavior and encouraged patient to contact this office, call 911, or present to the nearest emergency room should any of these events occur. Discussed crisis intervention services and means to access. Patient adamantly and convincingly denies current suicidal or homicidal ideation or perceptual disturbance.    Assessment:   Assessment   Patient appears to maintain relative stability as compared to their baseline.  However, patient continues to struggle with anxiety which continues to cause impairment in important areas of functioning.  A result, they can be reasonably expected to continue to benefit from treatment and would likely be at increased risk for decompensation otherwise.    Mental Status Exam:   Hygiene:   good  Cooperation:  Cooperative  Eye Contact:  Good  Psychomotor Behavior:  Appropriate  Affect:  Full range  Mood: anxious  Speech:  Normal  Thought Process:  Goal directed  Thought Content:  Mood congruent  Suicidal:  None  Homicidal:  None  Hallucinations:   None  Delusion:  None  Memory:  Intact  Orientation:  Person, Place, Time, and Situation  Reliability:  good  Insight:  Good  Judgement:  Good  Impulse Control:  Good  Physical/Medical Issues:  No        Patient's Support Network Includes:  significant other, parents, extended family, and friends    Functional Status: Mild impairment     Progress toward goal: Not at goal    Prognosis: Good with Ongoing Treatment          Plan:    Patient will continue in individual outpatient therapy with focus on improved functioning and coping skills, maintaining stability, and avoiding decompensation and the need for higher level of care.    Patient will adhere to medication regimen as prescribed and report any side effects. Patient will contact this office, call 911 or present to the nearest emergency room should suicidal or homicidal ideations occur. Provide Cognitive Behavioral Therapy and Solution Focused Therapy to improve functioning, maintain stability, and avoid decompensation and the need for higher level of care.     Return in about 3 weeks (around 7/18/2025).            VISIT DIAGNOSIS:     ICD-10-CM ICD-9-CM   1. Generalized anxiety disorder  F41.1 300.02        Psychotherapy time 55 minutes. This time is exclusive to the therapy session and separate from the time spent on activities used to meet the criteria for the E/M service (history, exam, medical decision making).        This document has been electronically signed by Shannon Ballard LCSW  June 27, 2025 13:51 EDT     Part of this note may be an electronic transcription/translation of spoken language to printed text using the Dragon Dictation System.    77

## 2025-07-03 DIAGNOSIS — F90.0 ATTENTION DEFICIT HYPERACTIVITY DISORDER (ADHD), INATTENTIVE TYPE, MODERATE: ICD-10-CM

## 2025-07-03 RX ORDER — DEXTROAMPHETAMINE SACCHARATE, AMPHETAMINE ASPARTATE MONOHYDRATE, DEXTROAMPHETAMINE SULFATE AND AMPHETAMINE SULFATE 5; 5; 5; 5 MG/1; MG/1; MG/1; MG/1
20 CAPSULE, EXTENDED RELEASE ORAL DAILY
Qty: 30 CAPSULE | Refills: 0 | Status: SHIPPED | OUTPATIENT
Start: 2025-07-03 | End: 2026-07-03

## 2025-07-03 NOTE — TELEPHONE ENCOUNTER
Rx Refill Note  Requested Prescriptions     Pending Prescriptions Disp Refills    amphetamine-dextroamphetamine XR (ADDERALL XR) 20 MG 24 hr capsule 30 capsule 0     Sig: Take 1 capsule by mouth Daily      Last office visit with prescribing clinician: 1/29/2025   Last telemedicine visit with prescribing clinician: 4/29/2025   Next office visit with prescribing clinician: 7/29/2025                         Would you like a call back once the refill request has been completed: [] Yes [] No    If the office needs to give you a call back, can they leave a voicemail: [] Yes [] No    Maria Elena Baca MA  07/03/25, 08:19 EDT

## 2025-07-18 ENCOUNTER — TELEMEDICINE (OUTPATIENT)
Dept: PSYCHIATRY | Facility: CLINIC | Age: 30
End: 2025-07-18
Payer: COMMERCIAL

## 2025-07-18 DIAGNOSIS — F41.1 GENERALIZED ANXIETY DISORDER: Primary | ICD-10-CM

## 2025-07-18 NOTE — PROGRESS NOTES
Mode of Visit: Video  Location of patient: -HOME-  Location of provider: +HOME+  You have chosen to receive care through a telehealth visit.  The patient has signed the video visit consent form.  The visit included audio and video interaction. No technical issues occurred during this visit.    Date: July 18, 2025  Time In: 9:05 a.m.   Time Out: 10:00 a.m.     This provider is located at the [facility] ([facility]), [address] [place] [address] using a secure [employer] Video Visit through [employer]. Patient is being seen remotely via telehealth at [address] [place] [address] in [place] and stated they are in a secure environment for this session. The patient's condition being diagnosed/treated is appropriate for telemedicine. The provider identified herself as well as her credentials. The patient, and/or patients guardian, consent to be seen remotely, and when consent is given they understand that the consent allows for patient identifiable information to be sent to a third party as needed. They may refuse to be seen remotely at any time. The electronic data is encrypted and password protected, and the patient and/or guardian has been advised of the potential risks to privacy not withstanding such measures.     You have chosen to receive care through a telehealth visit.  Do you consent to use a video/audio connection for your medical care today? Yes    PROGRESS NOTE  Data:  Humberto Gold is a 29 y.o. female who presents today for follow up    Chief Complaint: anxiety    History of Present Illness: Patient reports working through feelings of guilt related to her relationship with her mother, who exhibits narcissistic behaviors. Patient acknowledges a pattern of emotional manipulation and is beginning to recognize how these dynamics have impacted her sense of self-worth. Patient is gradually setting boundaries and building insight, though she  continues to struggle with residual guilt and emotional conflict.Patient has the support of her paramour, friends, and limited support from parents.      Clinical Maneuvering/Intervention:    (Scales based on 0 - 10 with 10 being the worst)  Depression: 0 Anxiety: 5       Assisted patient in processing above session content; acknowledged and normalized patient’s thoughts, feelings, and concerns.  Rationalized patient thought process regarding anxiety related to dealing with her mother.  Discussed triggers associated with patient's anxiety.  Also discussed coping skills for patient to implement such as maintaining boundaries, evaluating thoughts about guilt, spending time with family and friends.    Allowed patient to freely discuss issues without interruption or judgment. Provided safe, confidential environment to facilitate the development of positive therapeutic relationship and encourage open, honest communication. Assisted patient in identifying risk factors which would indicate the need for higher level of care including thoughts to harm self or others and/or self-harming behavior and encouraged patient to contact this office, call 911, or present to the nearest emergency room should any of these events occur. Discussed crisis intervention services and means to access. Patient adamantly and convincingly denies current suicidal or homicidal ideation or perceptual disturbance.    Assessment:   Assessment   Patient appears to maintain relative stability as compared to their baseline.  However, patient continues to struggle with anxiety which continues to cause impairment in important areas of functioning.  A result, they can be reasonably expected to continue to benefit from treatment and would likely be at increased risk for decompensation otherwise.    Mental Status Exam:   Hygiene:   good  Cooperation:  Cooperative  Eye Contact:  Good  Psychomotor Behavior:  Appropriate  Affect:  Full range  Mood:  depressed  Speech:  Normal  Thought Process:  Goal directed  Thought Content:  Mood congruent  Suicidal:  None  Homicidal:  None  Hallucinations:  None  Delusion:  None  Memory:  Intact  Orientation:  Person, Place, Time, and Situation  Reliability:  good  Insight:  Good  Judgement:  Good  Impulse Control:  Good  Physical/Medical Issues:  No        Patient's Support Network Includes:  significant other, parents, extended family, and friends    Functional Status: Mild impairment     Progress toward goal: Not at goal    Prognosis: Good with Ongoing Treatment          Plan:    Patient will continue in individual outpatient therapy with focus on improved functioning and coping skills, maintaining stability, and avoiding decompensation and the need for higher level of care.    Patient will adhere to medication regimen as prescribed and report any side effects. Patient will contact this office, call 911 or present to the nearest emergency room should suicidal or homicidal ideations occur. Provide Cognitive Behavioral Therapy and Solution Focused Therapy to improve functioning, maintain stability, and avoid decompensation and the need for higher level of care.     Return in about 4 weeks (around 8/15/2025).            VISIT DIAGNOSIS:     ICD-10-CM ICD-9-CM   1. Generalized anxiety disorder  F41.1 300.02        Psychotherapy time 55 minutes. This time is exclusive to the therapy session and separate from the time spent on activities used to meet the criteria for the E/M service (history, exam, medical decision making).        This document has been electronically signed by Shannon Ballard LCSW  July 18, 2025 13:36 EDT     Part of this note may be an electronic transcription/translation of spoken language to printed text using the Dragon Dictation System.

## 2025-07-29 ENCOUNTER — TELEMEDICINE (OUTPATIENT)
Age: 30
End: 2025-07-29
Payer: COMMERCIAL

## 2025-07-29 DIAGNOSIS — F90.0 ATTENTION DEFICIT HYPERACTIVITY DISORDER (ADHD), INATTENTIVE TYPE, MODERATE: Primary | ICD-10-CM

## 2025-07-29 DIAGNOSIS — F41.1 GENERALIZED ANXIETY DISORDER: ICD-10-CM

## 2025-07-29 RX ORDER — HYDROXYZINE HYDROCHLORIDE 10 MG/1
10 TABLET, FILM COATED ORAL 2 TIMES DAILY PRN
Qty: 60 TABLET | Refills: 1 | Status: SHIPPED | OUTPATIENT
Start: 2025-07-29

## 2025-07-29 RX ORDER — PAROXETINE 30 MG/1
30 TABLET, FILM COATED ORAL DAILY
Qty: 90 TABLET | Refills: 1 | Status: SHIPPED | OUTPATIENT
Start: 2025-07-29 | End: 2026-07-29

## 2025-07-29 RX ORDER — DEXTROAMPHETAMINE SACCHARATE, AMPHETAMINE ASPARTATE MONOHYDRATE, DEXTROAMPHETAMINE SULFATE AND AMPHETAMINE SULFATE 5; 5; 5; 5 MG/1; MG/1; MG/1; MG/1
20 CAPSULE, EXTENDED RELEASE ORAL DAILY
Qty: 30 CAPSULE | Refills: 0 | Status: SHIPPED | OUTPATIENT
Start: 2025-07-29 | End: 2026-07-29

## 2025-07-29 NOTE — PROGRESS NOTES
Video Visit      Patient Name: Humberto Gold  : 1995   MRN: [id number]     Referring Provider: Jadyn Dhillon DO    Chief Complaint:      ICD-10-CM ICD-9-CM   1. Attention deficit hyperactivity disorder (ADHD), inattentive type, moderate  F90.0 314.01   2. Generalized anxiety disorder  F41.1 300.02          History of Present Illness:   Humberto Gold is a 29 y.o. female who is being seen by a video visit today for follow up and medication management.           Subjective   Patient Reports:   History of Present Illness  She reports an increase in anxiety, which she attributes to the stress of wedding planning and family issues. This anxiety manifests as muscle tension and scattered attention. She experiences panic attacks every few weeks, with triggers including rushing or discussing the wedding. She also reports feelings of frustration and depression, which she is addressing with her therapist, Shannon. She does not endorse any thoughts of suicide, self-harm, or hallucinations. Her sleep is generally good, although she occasionally takes melatonin to help her fall asleep. Her appetite is normal. She does not report feelings of hopelessness or loneliness. She believes her attention and focus are good when her anxiety is under control. She finds Adderall helpful in managing her anxiety at times, as it clears her thoughts. She has previously tried hydroxyzine but reports it caused sleepiness. She is unsure of what dose of hydroxyzine she was prescribed. She has asthma.    Her wedding is .     MEDICATIONS  Current: Adderall, Paxil, melatonin  Past: hydroxyzine    Review of Systems:   Review of Systems   Constitutional:  Negative for appetite change, fatigue and unexpected weight change.   Eyes:  Negative for visual disturbance.   Respiratory:  Negative for chest tightness and shortness of breath.    Cardiovascular:  Negative for chest pain and palpitations.   Gastrointestinal:  Negative for  nausea.   Musculoskeletal:  Negative for gait problem.   Skin:  Negative for rash and wound.   Neurological:  Negative for dizziness, tremors, seizures, weakness, light-headedness and headaches.   Psychiatric/Behavioral:  Positive for dysphoric mood. Negative for agitation, behavioral problems, confusion, decreased concentration, hallucinations, self-injury, sleep disturbance and suicidal ideas. The patient is nervous/anxious. The patient is not hyperactive.    Sleep pattern: 7-8 hours per night   Appetite: normal       RISK ASSESSMENT:  Patient denies any thoughts of suicide or intent today. Patient denies any suicidal or homicidal ideation today. Patient denies any high risk factors today.     Medications:     Current Outpatient Medications:     amphetamine-dextroamphetamine XR (ADDERALL XR) 20 MG 24 hr capsule, Take 1 capsule by mouth Daily, Disp: 30 capsule, Rfl: 0    albuterol (PROVENTIL) (2.5 MG/3ML) 0.083% nebulizer solution, Take 2.5 mg by nebulization Every 6 (Six) Hours As Needed for Shortness of Air., Disp: , Rfl:     albuterol sulfate  (90 Base) MCG/ACT inhaler, Inhale 2 puffs Every 6 (Six) Hours As Needed for Wheezing or Shortness of Air., Disp: 8 g, Rfl: 0    budesonide-formoterol (Breyna) 160-4.5 MCG/ACT inhaler, INHALE 2 PUFFS BY MOUTH TWICE DAILY, Disp: 10.3 g, Rfl: 5    hydrOXYzine (ATARAX) 10 MG tablet, Take 1 tablet by mouth 2 (Two) Times a Day As Needed for Anxiety., Disp: 60 tablet, Rfl: 1    Levocetirizine Dihydrochloride (XYZAL ALLERGY 24HR PO), , Disp: , Rfl:     PARoxetine (PAXIL) 30 MG tablet, Take 1 tablet by mouth Daily., Disp: 90 tablet, Rfl: 1    Medication Considerations:  JUIDE reviewed and appropriate.      Allergies:   Allergies   Allergen Reactions    Peanuts [Peanut Oil] Anaphylaxis    Penicillins Anaphylaxis       Objective     Physical Exam:  Vital Signs: There were no vitals filed for this visit.  There is no height or weight on file to calculate BMI.   The patient was  [facility].  Unable to obtain vital signs due to nature of remote visit.  Height stated at 69.02 inches.  Weight stated at 164 pounds.     Mental Status Exam:   MENTAL STATUS EXAM   General Appearance:  Cleanly groomed and dressed and well developed  Eye Contact:  Good eye contact  Attitude:  Cooperative and polite  Motor Activity:  Other  Other Comment:  [facility] video visit  Muscle Strength:  Other  Other Comment:  [facility] video visit  Speech:  Normal rate, tone, volume  Language:  Spontaneous  Mood and affect:  Anxious and depressed  Hopelessness:  Denies  Loneliness: Denies  Thought Process:  Logical  Associations/ Thought Content:  No delusions  Hallucinations:  None  Suicidal Ideations:  Not present  Homicidal Ideation:  Not present  Sensorium:  Alert and clear  Orientation:  Person, place, time and situation  Immediate Recall, Recent, and Remote Memory:  Intact  Attention Span/ Concentration:  Good  Fund of Knowledge:  Appropriate for age and educational level  Intellectual Functioning:  Average range  Insight:  Good  Judgement:  Good  Reliability:  Good  Impulse Control:  Good       @RESULASTCBCDIFFPANEL,TSH,LABLIPI,QTXFGESC94,ASPTFTDW52,MG,FOLATE,PROLACTIN,CRPRESULT,CMP,[id number])@    Lab Results   Component Value Date    GLUCOSE 78 02/15/2024    BUN 6 02/15/2024    CREATININE 0.73 02/15/2024    EGFR 115.0 02/15/2024    BCR 8.2 02/15/2024    K 4.0 02/15/2024    CO2 22.9 02/15/2024    CALCIUM 9.6 02/15/2024    ALBUMIN 4.6 11/19/2024    BILITOT 0.3 11/19/2024    AST 17 11/19/2024    ALT 11 11/19/2024       Lab Results   Component Value Date    WBC 7.45 02/15/2024    HGB 15.4 02/15/2024    HCT 44.8 02/15/2024    MCV 88.9 02/15/2024     02/15/2024       Lab Results   Component Value Date    CHOL 185 02/15/2024    CHLPL 146 05/09/2017    TRIG 55 02/15/2024    HDL 75 (H) 02/15/2024    LDL 99 02/15/2024       Assessment / Plan      Visit Diagnosis/Orders Placed This  Visit:  Diagnoses and all orders for this visit:    1. Attention deficit hyperactivity disorder (ADHD), inattentive type, moderate (Primary)  -     amphetamine-dextroamphetamine XR (ADDERALL XR) 20 MG 24 hr capsule; Take 1 capsule by mouth Daily  Dispense: 30 capsule; Refill: 0    2. Generalized anxiety disorder  -     PARoxetine (PAXIL) 30 MG tablet; Take 1 tablet by mouth Daily.  Dispense: 90 tablet; Refill: 1  -     hydrOXYzine (ATARAX) 10 MG tablet; Take 1 tablet by mouth 2 (Two) Times a Day As Needed for Anxiety.  Dispense: 60 tablet; Refill: 1         Assessment & Plan  1. Anxiety an Depression.  Her anxiety and depression have increased. Her increased anxiety appears to be impacting her attention and focus. The current dosage of Paxil was previously managing her symptoms effectively, but not at this current time. She reports that Adderall helps with her anxiety by clearing her thoughts. Hydroxyzine was discussed as a potential treatment option, but she expressed concerns about its sedative effects. She will continue with her current regimen of Adderall 20 mg. Hydroxyzine will be prescribed for use during periods of heightened anxiety or panic attacks. Paxil will be increased to 30mg.       Functional Status: Mild impairment     Prognosis: Good with Ongoing Treatment     Impression/Formulation:  Patient appeared alert and oriented.  Patient is voluntarily requesting to continue outpatient psychiatric treatment at [facility].  Patient is receptive to assistance with maintaining a stable lifestyle.  Patient presents with history of     ICD-10-CM ICD-9-CM   1. Attention deficit hyperactivity disorder (ADHD), inattentive type, moderate  F90.0 314.01   2. Generalized anxiety disorder  F41.1 300.02   . Reviewed patient's previous provider notes. Reviewed most recent labs. Patient meets DSM V diagnostic criteria for diagnoses. Diagnoses may be updated as more information becomes  available.     Treatment Plan:   Continue Adderall XR 20mg PO qam.   Increase Paxil to 30 mg PO qd  Initiate hydroxyzine 10 mg PO PRN for anxiety  Patient was informed Provider's last day at [facility] is August 14. Patient verbalized understanding.   Encouraged to continue psychotherapy  Follow up in 6 weeks and as needed    Patient will continue supportive psychotherapy efforts and medications as indicated. Clinic will obtain release of information for current treatment team for continuity of care as needed. Patient will contact this office, call 911 or present to the nearest emergency room should suicidal or homicidal ideations occur. Discussed medication options and treatment plan of prescribed medication(s) as well as the risks, benefits, and potential side effects. Patient ackowledged and verbally consented to continue with current treatment plan and was educated on the importance of compliance with treatment and follow-up appointments.     Medication risks and side effects discussed with patient including risk for worsening mood, changes in behavior, thoughts of suicide or homicide, induction of amado, serotonin syndrome, rare hyponatremia, rare SIADH, withdrawal symptoms if abrupt withdrawal, sexual dysfunction.   If any thoughts of SI or HI, worsening mood or changes in behavior, call 911 or crisis line [phone], or go to nearest ER at once. Patient agrees to notify close family/friend of new trial of antidepressants/info above. Pt.verbalizes understanding and consents to treatment with this medication.    Medication options for treatment of ADHD discussed including stimulant and non-stimulant options. Extensive education is provided regarding risks associated with stimulant use including but not limited to:, insomnia, headache, exacerbation of tics, nervousness, irritability, overstimulation, tremor, dizziness, anorexia or change in appetite, nausea, dry mouth, constipation, diarrhea, weight  loss, sexual dysfunction, psychotic episodes, seizures, palpitations, tachycardia, hypertension, rare activation (activation of hypomania, amado, and/or suicidal ideations), cardiovascular adverse effects including sudden death. Patient denies any personal or family history of seizures or structural cardiac abnormalities.     Controlled substance agreement reviewed and signed by patient, Patient  is  informed that the medication is to be used by the patient only, the medication is to be used only as directed, and the medication should not be combined with other substances unless directed by a Provider/Prescriber. I advised patients to avoid taking  medication with alcohol or illicit/unprescribed substances., including THC. Patient understands that habitual use of THC while being prescribed a stimulant will result in provider discontinuing stimulant medication due to the cognition dulling effects of marijuana negating the cognitive enhancing action of the stimulant. The patient verbalizes understanding and agreement with this in their own words, and wishes to pursue proposed treatment plan.       Quality Measures:   Never smoker    I advised Humberto Gold of the risks of tobacco use.       Follow Up:   Return in about 6 weeks (around 9/9/2025).    Patient or patient representative verbalized consent for the use of Ambient Listening during the visit with  GOGO Malone for chart documentation. 7/29/2025  08:18 EDT    GOGO Malone, [facility] [address]

## 2025-08-15 ENCOUNTER — TELEMEDICINE (OUTPATIENT)
Dept: PSYCHIATRY | Facility: CLINIC | Age: 30
End: 2025-08-15
Payer: COMMERCIAL

## 2025-08-15 DIAGNOSIS — F41.1 GENERALIZED ANXIETY DISORDER: ICD-10-CM

## 2025-08-15 DIAGNOSIS — F90.2 ADHD (ATTENTION DEFICIT HYPERACTIVITY DISORDER), COMBINED TYPE: Primary | ICD-10-CM

## 2025-08-22 DIAGNOSIS — F90.0 ATTENTION DEFICIT HYPERACTIVITY DISORDER (ADHD), INATTENTIVE TYPE, MODERATE: ICD-10-CM

## 2025-08-25 RX ORDER — DEXTROAMPHETAMINE SACCHARATE, AMPHETAMINE ASPARTATE MONOHYDRATE, DEXTROAMPHETAMINE SULFATE AND AMPHETAMINE SULFATE 5; 5; 5; 5 MG/1; MG/1; MG/1; MG/1
20 CAPSULE, EXTENDED RELEASE ORAL DAILY
Qty: 30 CAPSULE | Refills: 0 | Status: SHIPPED | OUTPATIENT
Start: 2025-08-28